# Patient Record
Sex: MALE | Race: WHITE | Employment: FULL TIME | URBAN - METROPOLITAN AREA
[De-identification: names, ages, dates, MRNs, and addresses within clinical notes are randomized per-mention and may not be internally consistent; named-entity substitution may affect disease eponyms.]

---

## 2018-01-01 ENCOUNTER — HOSPITAL ENCOUNTER (EMERGENCY)
Age: 83
Discharge: HOME | End: 2018-09-09
Attending: EMERGENCY MEDICINE
Payer: COMMERCIAL

## 2018-01-01 VITALS
DIASTOLIC BLOOD PRESSURE: 95 MMHG | HEART RATE: 66 BPM | BODY MASS INDEX: 33.43 KG/M2 | HEIGHT: 66 IN | RESPIRATION RATE: 20 BRPM | WEIGHT: 208 LBS | TEMPERATURE: 97.9 F | OXYGEN SATURATION: 77 % | SYSTOLIC BLOOD PRESSURE: 136 MMHG

## 2018-01-01 DIAGNOSIS — T50.901A ACCIDENTAL DRUG OVERDOSE, INITIAL ENCOUNTER: Primary | ICD-10-CM

## 2018-01-01 DIAGNOSIS — N17.9 AKI (ACUTE KIDNEY INJURY) (HCC): ICD-10-CM

## 2018-01-01 LAB
ANION GAP SERPL CALC-SCNC: 6 MMOL/L (ref 3–18)
ATRIAL RATE: 81 BPM
BASOPHILS # BLD: 0 K/UL (ref 0–0.1)
BASOPHILS NFR BLD: 1 % (ref 0–2)
BUN SERPL-MCNC: 30 MG/DL (ref 7–18)
BUN/CREAT SERPL: 17 (ref 12–20)
CALCIUM SERPL-MCNC: 8.1 MG/DL (ref 8.5–10.1)
CALCULATED P AXIS, ECG09: 46 DEGREES
CALCULATED R AXIS, ECG10: 62 DEGREES
CALCULATED T AXIS, ECG11: 35 DEGREES
CHLORIDE SERPL-SCNC: 106 MMOL/L (ref 100–108)
CO2 SERPL-SCNC: 28 MMOL/L (ref 21–32)
CREAT SERPL-MCNC: 1.81 MG/DL (ref 0.6–1.3)
DIAGNOSIS, 93000: NORMAL
DIFFERENTIAL METHOD BLD: ABNORMAL
EOSINOPHIL # BLD: 0.4 K/UL (ref 0–0.4)
EOSINOPHIL NFR BLD: 6 % (ref 0–5)
ERYTHROCYTE [DISTWIDTH] IN BLOOD BY AUTOMATED COUNT: 13.5 % (ref 11.6–14.5)
GLUCOSE SERPL-MCNC: 103 MG/DL (ref 74–99)
HCT VFR BLD AUTO: 36.9 % (ref 36–48)
HGB BLD-MCNC: 11.9 G/DL (ref 13–16)
LYMPHOCYTES # BLD: 1.6 K/UL (ref 0.9–3.6)
LYMPHOCYTES NFR BLD: 24 % (ref 21–52)
MAGNESIUM SERPL-MCNC: 2.1 MG/DL (ref 1.6–2.6)
MCH RBC QN AUTO: 28.5 PG (ref 24–34)
MCHC RBC AUTO-ENTMCNC: 32.2 G/DL (ref 31–37)
MCV RBC AUTO: 88.3 FL (ref 74–97)
MONOCYTES # BLD: 0.6 K/UL (ref 0.05–1.2)
MONOCYTES NFR BLD: 9 % (ref 3–10)
NEUTS SEG # BLD: 3.9 K/UL (ref 1.8–8)
NEUTS SEG NFR BLD: 60 % (ref 40–73)
P-R INTERVAL, ECG05: 226 MS
PLATELET # BLD AUTO: 208 K/UL (ref 135–420)
PMV BLD AUTO: 10.6 FL (ref 9.2–11.8)
POTASSIUM SERPL-SCNC: 4.2 MMOL/L (ref 3.5–5.5)
Q-T INTERVAL, ECG07: 438 MS
QRS DURATION, ECG06: 158 MS
QTC CALCULATION (BEZET), ECG08: 508 MS
RBC # BLD AUTO: 4.18 M/UL (ref 4.7–5.5)
SODIUM SERPL-SCNC: 140 MMOL/L (ref 136–145)
VENTRICULAR RATE, ECG03: 81 BPM
WBC # BLD AUTO: 6.5 K/UL (ref 4.6–13.2)

## 2018-01-01 PROCEDURE — 99284 EMERGENCY DEPT VISIT MOD MDM: CPT

## 2018-01-01 PROCEDURE — 93005 ELECTROCARDIOGRAM TRACING: CPT

## 2018-01-01 PROCEDURE — 85025 COMPLETE CBC W/AUTO DIFF WBC: CPT | Performed by: EMERGENCY MEDICINE

## 2018-01-01 PROCEDURE — 96360 HYDRATION IV INFUSION INIT: CPT

## 2018-01-01 PROCEDURE — 80048 BASIC METABOLIC PNL TOTAL CA: CPT | Performed by: EMERGENCY MEDICINE

## 2018-01-01 PROCEDURE — 83735 ASSAY OF MAGNESIUM: CPT | Performed by: EMERGENCY MEDICINE

## 2018-01-01 PROCEDURE — 96361 HYDRATE IV INFUSION ADD-ON: CPT

## 2018-01-01 PROCEDURE — 74011250636 HC RX REV CODE- 250/636: Performed by: EMERGENCY MEDICINE

## 2018-01-01 RX ORDER — CARVEDILOL 3.12 MG/1
TABLET ORAL 2 TIMES DAILY WITH MEALS
COMMUNITY
End: 2019-01-01

## 2018-01-01 RX ORDER — FUROSEMIDE 20 MG/1
TABLET ORAL DAILY
COMMUNITY
End: 2019-01-01

## 2018-01-01 RX ADMIN — SODIUM CHLORIDE 500 ML: 900 INJECTION, SOLUTION INTRAVENOUS at 04:19

## 2018-09-09 NOTE — DISCHARGE INSTRUCTIONS
Accidental Overdose of Medicine: Care Instructions  Your Care Instructions    Almost any medicine can cause harm if you take too much of it. You have had treatment to help your body get rid of an overdose of a medicine. This may have been an over-the-counter medicine. Or it might be one that a doctor prescribed. It may even have been a vitamin or a supplement. During treatment, the doctor may have given you fluids and medicine. You also may have had lab tests. Then the doctor made sure that you were well enough to go home. The doctor has checked you carefully, but problems can develop later. If you notice any problems or new symptoms, get medical treatment right away. Follow-up care is a key part of your treatment and safety. Be sure to make and go to all appointments, and call your doctor if you are having problems. It's also a good idea to know your test results and keep a list of the medicines you take. How can you care for yourself at home? Home care  · Drink plenty of fluids. If you have kidney, heart, or liver disease and have to limit fluids, talk with your doctor before you increase the amount of fluids you drink. · If you normally take medicines, ask your doctor when you can start taking them again. · Read the information that comes with any medicine. If you have questions, ask your doctor or pharmacist.  Prevention  · Be safe with medicines. Take your medicines exactly as prescribed or as the label directs. Call your doctor if you think you are having a problem with your medicine. · Keep your medicines in the containers they came in. Keep them with the original labels. · Find out what to do if you miss a dose of your medicine. When should you call for help? Call 911 anytime you think you may need emergency care.  For example, call if:    · You passed out (lost consciousness).     · You have trouble breathing.     · You are sleepy or hard to wake up.   [facility] your doctor now or seek immediate medical care if:    · You are vomiting.     · You have a new or worse headache.     · You are dizzy or lightheaded or feel like you may faint.    Watch closely for changes in your health, and be sure to contact your doctor if:    · You do not get better as expected. Where can you learn more? Go to http://therese-flakita.info/. Enter C165 in the search box to learn more about \"Accidental Overdose of Medicine: Care Instructions. \"  Current as of: September 10, 2017  Content Version: 11.7  © 2969-2527 [employer]. Care instructions adapted under license by [employer] (which disclaims liability or warranty for this information). If you have questions about a medical condition or this instruction, always ask your healthcare professional. [address] any warranty or liability for your use of this information.

## 2018-09-09 NOTE — ED NOTES
I have reviewed discharge instructions with the patient. The patient verbalized understanding. Reviewed discharge instructions with patient and patient verbalized understanding of all instructions. Patient signed paper discharge instructions due to computer signature pad unavailable at this time. Patient armband removed and shredded

## 2018-09-09 NOTE — ED PROVIDER NOTES
EMERGENCY DEPARTMENT HISTORY AND PHYSICAL EXAM 
 
6:27 AM 
 
 
Date: 9/9/2018 Patient Name: Hang Cuellar History of Presenting Illness Chief Complaint Patient presents with  Medication Problem Patient with history of high blood pressure, CHF pulmonary fibrosis peripheral arterial disease and diabetes presents via walk-in triage. At approximately midnight he accidentally took an overdose of his Coreg and Lasix. These pills were stored in a small cup that he normally uses to drink water he filled back up in took the pills and he realized that he had overdosed himself. He has no symptoms at this time no lightheadedness no chest pain or shortness of breath no dizziness he has urinated once. His dose of Coreg is 3.125, he took 6 tablets of this so total of approximately 18 mg of Coreg and approximately 120 mg of Lasix PCP: Kade Fisher MD 
 
Current Outpatient Prescriptions Medication Sig Dispense Refill  furosemide (LASIX) 20 mg tablet Take  by mouth daily.  carvedilol (COREG) 3.125 mg tablet Take  by mouth two (2) times daily (with meals). Past History Past Medical History: 
Past Medical History:  
Diagnosis Date  Hypertension Past Surgical History: 
Past Surgical History:  
Procedure Laterality Date  CARDIAC SURG PROCEDURE UNLIST  HX ORTHOPAEDIC Family History: 
History reviewed. No pertinent family history. Social History: 
Social History Substance Use Topics  Smoking status: Former Smoker  Smokeless tobacco: None  Alcohol use Yes Allergies: 
No Known Allergies Review of Systems Review of Systems Constitutional: Negative for fever. HENT: Negative for nosebleeds. Eyes: Negative for visual disturbance. Respiratory: Negative for shortness of breath. Cardiovascular: Negative for chest pain. Gastrointestinal: Negative for abdominal pain. Genitourinary: Negative for dysuria. Musculoskeletal: Negative for myalgias. Skin: Negative for rash. Neurological: Negative for weakness. All other systems reviewed and are negative. Visit Vitals  BP (!) 136/95  Pulse 66  Temp 97.9 °F (36.6 °C)  Resp 20  
 Ht 5' 6\" (1.676 m)  Wt 94.3 kg (208 lb)  SpO2 (!) 77%  BMI 33.57 kg/m2 Physical Exam / Medical Decision Making I am the first provider for this patient. I reviewed the vital signs, available nursing notes, past medical history, past surgical history, family history and social history. Vital Signs-Reviewed the patient's vital signs. Physical exam: 
General:  Well-developed, well-nourished, no apparent distress. Head:  Normocephalic atraumatic. Eyes:  Pupils midrange extraocular movements intact. No pallor or conjunctival injection. Nose:  No rhinorrhea, inspection grossly normal.   
Ears:  Grossly normal to inspection, no discharge. Mouth:  Mucous membranes moist, no appreciable intraoral lesion. Neck/Back:  Trachea midline, no asymmetry. Chest:  Grossly normal inspection, symmetric chest rise. Pulmonary:  Clear to auscultation bilaterally no wheezes rhonchi or rales. Cardiovascular:  S1-S2 no murmurs rubs or gallops. Abdomen: Soft, nontender, nondistended no guarding rebound or peritoneal signs. Extremities:  Grossly normal to inspection, peripheral pulses intact Neurologic:  Alert and oriented no appreciable focal neurologic deficit Skin:  Warm and dry Psychiatric:  Grossly normal mood and affect Nursing note reviewed, vital signs reviewed. ED course: 
Patient presents with accidental overdose on medications, here he is afebrile and not tachycardic saturation normal on room air primary concern for hypotension and bradycardia. He has neither of these at this time and is asymptomatic. We'll continue to monitor Monitor normal sinus rhythm with PVC 
 
 EKG done at 0141 hrs. sinus rhythm heart rate 81 significant prolongation of NC interval is 226 QRS duration 158 LEFT bundle branch block morphology and long QTc of 508.,  No prior EKGs in this system Patient had transient low blood pressure responded to IV fluids Patient was monitored for 5 hours in the emergency department, department with 7 hours postingestion, normal blood pressure, completely asymptomatic and ambulatory without difficulty Labs with SETH compared to prior, likely secondary to his Lasix he was hydrated here and will follow up with PCP for reevaluation and recheck Patient's presentation, history, physical exam and laboratory evaluations were reviewed. At this time patient was felt to be stable for outpatient management and follow with primary care/specialist.  Patient was instructed to return to the emergency department with any concerns. Disposition: 
 
Discharged home Portions of this chart were created with Dragon medical speech to text program.   Unrecognized errors may be present. Diagnostic Study Results Labs - Recent Results (from the past 12 hour(s)) CBC WITH AUTOMATED DIFF Collection Time: 09/09/18  1:40 AM  
Result Value Ref Range WBC 6.5 4.6 - 13.2 K/uL  
 RBC 4.18 (L) 4.70 - 5.50 M/uL  
 HGB 11.9 (L) 13.0 - 16.0 g/dL HCT 36.9 36.0 - 48.0 % MCV 88.3 74.0 - 97.0 FL  
 MCH 28.5 24.0 - 34.0 PG  
 MCHC 32.2 31.0 - 37.0 g/dL  
 RDW 13.5 11.6 - 14.5 % PLATELET 772 633 - 965 K/uL MPV 10.6 9.2 - 11.8 FL  
 NEUTROPHILS 60 40 - 73 % LYMPHOCYTES 24 21 - 52 % MONOCYTES 9 3 - 10 % EOSINOPHILS 6 (H) 0 - 5 % BASOPHILS 1 0 - 2 %  
 ABS. NEUTROPHILS 3.9 1.8 - 8.0 K/UL  
 ABS. LYMPHOCYTES 1.6 0.9 - 3.6 K/UL  
 ABS. MONOCYTES 0.6 0.05 - 1.2 K/UL  
 ABS. EOSINOPHILS 0.4 0.0 - 0.4 K/UL  
 ABS. BASOPHILS 0.0 0.0 - 0.1 K/UL  
 DF AUTOMATED METABOLIC PANEL, BASIC Collection Time: 09/09/18  1:40 AM  
Result Value Ref Range Sodium 140 136 - 145 mmol/L Potassium 4.2 3.5 - 5.5 mmol/L Chloride 106 100 - 108 mmol/L  
 CO2 28 21 - 32 mmol/L Anion gap 6 3.0 - 18 mmol/L Glucose 103 (H) 74 - 99 mg/dL BUN 30 (H) 7.0 - 18 MG/DL Creatinine 1.81 (H) 0.6 - 1.3 MG/DL  
 BUN/Creatinine ratio 17 12 - 20 GFR est AA 43 (L) >60 ml/min/1.73m2 GFR est non-AA 36 (L) >60 ml/min/1.73m2 Calcium 8.1 (L) 8.5 - 10.1 MG/DL MAGNESIUM Collection Time: 09/09/18  1:40 AM  
Result Value Ref Range Magnesium 2.1 1.6 - 2.6 mg/dL EKG, 12 LEAD, INITIAL Collection Time: 09/09/18  1:41 AM  
Result Value Ref Range Ventricular Rate 81 BPM  
 Atrial Rate 81 BPM  
 P-R Interval 226 ms  
 QRS Duration 158 ms Q-T Interval 438 ms QTC Calculation (Bezet) 508 ms Calculated P Axis 46 degrees Calculated R Axis 62 degrees Calculated T Axis 35 degrees Diagnosis Sinus rhythm with sinus arrhythmia with 1st degree AV block with occasional  
premature ventricular complexes Left bundle branch block Abnormal ECG When compared with ECG of 30-MAR-2009 04:27, 
premature ventricular complexes are now present Left bundle branch block is now present Criteria for Septal infarct are no longer present Radiologic Studies - No orders to display Diagnosis Clinical Impression: 1. Accidental drug overdose, initial encounter 2. SETH (acute kidney injury) ([facility] [address] Follow-up Information Follow up With Details Comments Contact Info  
 your own PCP Call in 2 days [facility] EMERGENCY DEPT  As needed, If symptoms worsen [address] 
[phone] Patient's Medications Start Taking No medications on file Continue Taking CARVEDILOL (COREG) 3.125 MG TABLET    Take  by mouth two (2) times daily (with meals). FUROSEMIDE (LASIX) 20 MG TABLET    Take  by mouth daily. These Medications have changed No medications on file Stop Taking No medications on file

## 2019-01-01 ENCOUNTER — APPOINTMENT (OUTPATIENT)
Dept: NON INVASIVE DIAGNOSTICS | Age: 84
DRG: 291 | End: 2019-01-01
Attending: FAMILY MEDICINE
Payer: COMMERCIAL

## 2019-01-01 ENCOUNTER — APPOINTMENT (OUTPATIENT)
Dept: GENERAL RADIOLOGY | Age: 84
DRG: 291 | End: 2019-01-01
Attending: EMERGENCY MEDICINE
Payer: COMMERCIAL

## 2019-01-01 ENCOUNTER — APPOINTMENT (OUTPATIENT)
Dept: CT IMAGING | Age: 84
DRG: 291 | End: 2019-01-01
Attending: INTERNAL MEDICINE
Payer: COMMERCIAL

## 2019-01-01 ENCOUNTER — APPOINTMENT (OUTPATIENT)
Dept: GENERAL RADIOLOGY | Age: 84
DRG: 291 | End: 2019-01-01
Attending: INTERNAL MEDICINE
Payer: COMMERCIAL

## 2019-01-01 ENCOUNTER — ANESTHESIA (OUTPATIENT)
Dept: ICU | Age: 84
DRG: 291 | End: 2019-01-01
Payer: COMMERCIAL

## 2019-01-01 ENCOUNTER — APPOINTMENT (OUTPATIENT)
Dept: MRI IMAGING | Age: 84
DRG: 291 | End: 2019-01-01
Attending: NURSE PRACTITIONER
Payer: COMMERCIAL

## 2019-01-01 ENCOUNTER — HOSPITAL ENCOUNTER (INPATIENT)
Age: 84
LOS: 12 days | DRG: 291 | End: 2019-01-15
Attending: EMERGENCY MEDICINE | Admitting: FAMILY MEDICINE
Payer: COMMERCIAL

## 2019-01-01 ENCOUNTER — HOME CARE VISIT (OUTPATIENT)
Dept: HOME HEALTH SERVICES | Facility: HOME HEALTH | Age: 84
End: 2019-01-01

## 2019-01-01 ENCOUNTER — HOME HEALTH ADMISSION (OUTPATIENT)
Dept: HOME HEALTH SERVICES | Facility: HOME HEALTH | Age: 84
End: 2019-01-01

## 2019-01-01 ENCOUNTER — ANESTHESIA EVENT (OUTPATIENT)
Dept: ICU | Age: 84
DRG: 291 | End: 2019-01-01
Payer: COMMERCIAL

## 2019-01-01 VITALS
DIASTOLIC BLOOD PRESSURE: 30 MMHG | SYSTOLIC BLOOD PRESSURE: 51 MMHG | TEMPERATURE: 99 F | HEART RATE: 86 BPM | RESPIRATION RATE: 11 BRPM | OXYGEN SATURATION: 84 % | HEIGHT: 66 IN | WEIGHT: 218.03 LBS | BODY MASS INDEX: 35.04 KG/M2

## 2019-01-01 DIAGNOSIS — R09.02 HYPOXIA: ICD-10-CM

## 2019-01-01 DIAGNOSIS — J18.9 HCAP (HEALTHCARE-ASSOCIATED PNEUMONIA): ICD-10-CM

## 2019-01-01 DIAGNOSIS — J06.9 ACUTE RESPIRATORY DISEASE: ICD-10-CM

## 2019-01-01 DIAGNOSIS — R52 PAIN: ICD-10-CM

## 2019-01-01 DIAGNOSIS — N18.9 CHRONIC KIDNEY DISEASE, UNSPECIFIED CKD STAGE: ICD-10-CM

## 2019-01-01 DIAGNOSIS — I50.9 ACUTE CONGESTIVE HEART FAILURE, UNSPECIFIED HEART FAILURE TYPE (HCC): Primary | ICD-10-CM

## 2019-01-01 LAB
ABO + RH BLD: NORMAL
ALBUMIN SERPL-MCNC: 2.3 G/DL (ref 3.4–5)
ALBUMIN SERPL-MCNC: 2.4 G/DL (ref 3.4–5)
ALBUMIN SERPL-MCNC: 2.4 G/DL (ref 3.4–5)
ALBUMIN SERPL-MCNC: 2.5 G/DL (ref 3.4–5)
ALBUMIN SERPL-MCNC: 2.6 G/DL (ref 3.4–5)
ALBUMIN SERPL-MCNC: 3.2 G/DL (ref 3.4–5)
ALBUMIN/GLOB SERPL: 0.9 {RATIO} (ref 0.8–1.7)
ALBUMIN/GLOB SERPL: 0.9 {RATIO} (ref 0.8–1.7)
ALBUMIN/GLOB SERPL: 1 {RATIO} (ref 0.8–1.7)
ALP SERPL-CCNC: 51 U/L (ref 45–117)
ALP SERPL-CCNC: 52 U/L (ref 45–117)
ALP SERPL-CCNC: 66 U/L (ref 45–117)
ALT SERPL-CCNC: 47 U/L (ref 16–61)
ALT SERPL-CCNC: 59 U/L (ref 16–61)
ALT SERPL-CCNC: 85 U/L (ref 16–61)
AMORPH CRY URNS QL MICRO: ABNORMAL
ANION GAP SERPL CALC-SCNC: 10 MMOL/L (ref 3–18)
ANION GAP SERPL CALC-SCNC: 6 MMOL/L (ref 3–18)
ANION GAP SERPL CALC-SCNC: 7 MMOL/L (ref 3–18)
ANION GAP SERPL CALC-SCNC: 8 MMOL/L (ref 3–18)
ANION GAP SERPL CALC-SCNC: 9 MMOL/L (ref 3–18)
APPEARANCE UR: ABNORMAL
APPEARANCE UR: CLEAR
APTT PPP: 110.5 SEC (ref 23–36.4)
APTT PPP: 24.4 SEC (ref 23–36.4)
APTT PPP: 72.6 SEC (ref 23–36.4)
APTT PPP: 88.5 SEC (ref 23–36.4)
APTT PPP: 94.2 SEC (ref 23–36.4)
APTT PPP: 96 SEC (ref 23–36.4)
APTT PPP: >180 SEC (ref 23–36.4)
ARTERIAL PATENCY WRIST A: ABNORMAL
ARTERIAL PATENCY WRIST A: YES
AST SERPL-CCNC: 20 U/L (ref 15–37)
AST SERPL-CCNC: 27 U/L (ref 15–37)
AST SERPL-CCNC: 77 U/L (ref 15–37)
ATRIAL RATE: 78 BPM
ATRIAL RATE: 82 BPM
ATRIAL RATE: 91 BPM
ATRIAL RATE: 93 BPM
BACTERIA SPEC CULT: NORMAL
BACTERIA URNS QL MICRO: ABNORMAL /HPF
BASE EXCESS BLD CALC-SCNC: 4 MMOL/L
BASE EXCESS BLD CALC-SCNC: 5 MMOL/L
BASOPHILS # BLD: 0 K/UL (ref 0–0.1)
BASOPHILS NFR BLD: 0 % (ref 0–2)
BDY SITE: ABNORMAL
BDY SITE: ABNORMAL
BILIRUB SERPL-MCNC: 0.4 MG/DL (ref 0.2–1)
BILIRUB SERPL-MCNC: 0.5 MG/DL (ref 0.2–1)
BILIRUB SERPL-MCNC: 0.7 MG/DL (ref 0.2–1)
BILIRUB UR QL: NEGATIVE
BILIRUB UR QL: NEGATIVE
BLD PROD TYP BPU: NORMAL
BLOOD GROUP ANTIBODIES SERPL: NORMAL
BNP SERPL-MCNC: ABNORMAL PG/ML (ref 0–1800)
BPU ID: NORMAL
BUN SERPL-MCNC: 43 MG/DL (ref 7–18)
BUN SERPL-MCNC: 46 MG/DL (ref 7–18)
BUN SERPL-MCNC: 48 MG/DL (ref 7–18)
BUN SERPL-MCNC: 52 MG/DL (ref 7–18)
BUN SERPL-MCNC: 55 MG/DL (ref 7–18)
BUN SERPL-MCNC: 57 MG/DL (ref 7–18)
BUN SERPL-MCNC: 59 MG/DL (ref 7–18)
BUN SERPL-MCNC: 61 MG/DL (ref 7–18)
BUN SERPL-MCNC: 66 MG/DL (ref 7–18)
BUN SERPL-MCNC: 79 MG/DL (ref 7–18)
BUN SERPL-MCNC: 82 MG/DL (ref 7–18)
BUN SERPL-MCNC: 84 MG/DL (ref 7–18)
BUN SERPL-MCNC: 86 MG/DL (ref 7–18)
BUN/CREAT SERPL: 15 (ref 12–20)
BUN/CREAT SERPL: 15 (ref 12–20)
BUN/CREAT SERPL: 16 (ref 12–20)
BUN/CREAT SERPL: 17 (ref 12–20)
BUN/CREAT SERPL: 17 (ref 12–20)
BUN/CREAT SERPL: 18 (ref 12–20)
BUN/CREAT SERPL: 18 (ref 12–20)
BUN/CREAT SERPL: 19 (ref 12–20)
BUN/CREAT SERPL: 20 (ref 12–20)
CA-I SERPL-SCNC: 1.05 MMOL/L (ref 1.12–1.32)
CALCIUM SERPL-MCNC: 7.4 MG/DL (ref 8.5–10.1)
CALCIUM SERPL-MCNC: 7.7 MG/DL (ref 8.5–10.1)
CALCIUM SERPL-MCNC: 7.7 MG/DL (ref 8.5–10.1)
CALCIUM SERPL-MCNC: 7.8 MG/DL (ref 8.5–10.1)
CALCIUM SERPL-MCNC: 7.9 MG/DL (ref 8.5–10.1)
CALCIUM SERPL-MCNC: 7.9 MG/DL (ref 8.5–10.1)
CALCIUM SERPL-MCNC: 8 MG/DL (ref 8.5–10.1)
CALCIUM SERPL-MCNC: 8.2 MG/DL (ref 8.5–10.1)
CALCULATED P AXIS, ECG09: 12 DEGREES
CALCULATED P AXIS, ECG09: 14 DEGREES
CALCULATED P AXIS, ECG09: 14 DEGREES
CALCULATED P AXIS, ECG09: 5 DEGREES
CALCULATED R AXIS, ECG10: -58 DEGREES
CALCULATED R AXIS, ECG10: 3 DEGREES
CALCULATED R AXIS, ECG10: 56 DEGREES
CALCULATED R AXIS, ECG10: 70 DEGREES
CALCULATED T AXIS, ECG11: -58 DEGREES
CALCULATED T AXIS, ECG11: 13 DEGREES
CALCULATED T AXIS, ECG11: 23 DEGREES
CALCULATED T AXIS, ECG11: 50 DEGREES
CALLED TO:,BCALL1: NORMAL
CHLORIDE SERPL-SCNC: 102 MMOL/L (ref 100–108)
CHLORIDE SERPL-SCNC: 104 MMOL/L (ref 100–108)
CHLORIDE SERPL-SCNC: 104 MMOL/L (ref 100–108)
CHLORIDE SERPL-SCNC: 106 MMOL/L (ref 100–108)
CHLORIDE SERPL-SCNC: 107 MMOL/L (ref 100–108)
CHLORIDE SERPL-SCNC: 107 MMOL/L (ref 100–108)
CHLORIDE SERPL-SCNC: 108 MMOL/L (ref 100–108)
CHLORIDE SERPL-SCNC: 109 MMOL/L (ref 100–108)
CHLORIDE SERPL-SCNC: 98 MMOL/L (ref 100–108)
CHLORIDE SERPL-SCNC: 99 MMOL/L (ref 100–108)
CHLORIDE SERPL-SCNC: 99 MMOL/L (ref 100–108)
CK MB CFR SERPL CALC: 5.2 % (ref 0–4)
CK MB CFR SERPL CALC: 5.3 % (ref 0–4)
CK MB SERPL-MCNC: 2.8 NG/ML (ref 5–25)
CK MB SERPL-MCNC: 2.9 NG/ML (ref 5–25)
CK SERPL-CCNC: 1005 U/L (ref 39–308)
CK SERPL-CCNC: 53 U/L (ref 39–308)
CK SERPL-CCNC: 56 U/L (ref 39–308)
CO2 SERPL-SCNC: 26 MMOL/L (ref 21–32)
CO2 SERPL-SCNC: 28 MMOL/L (ref 21–32)
CO2 SERPL-SCNC: 29 MMOL/L (ref 21–32)
CO2 SERPL-SCNC: 30 MMOL/L (ref 21–32)
CO2 SERPL-SCNC: 31 MMOL/L (ref 21–32)
CO2 SERPL-SCNC: 32 MMOL/L (ref 21–32)
CO2 SERPL-SCNC: 32 MMOL/L (ref 21–32)
COLOR UR: YELLOW
COLOR UR: YELLOW
CREAT SERPL-MCNC: 2.35 MG/DL (ref 0.6–1.3)
CREAT SERPL-MCNC: 2.48 MG/DL (ref 0.6–1.3)
CREAT SERPL-MCNC: 3.04 MG/DL (ref 0.6–1.3)
CREAT SERPL-MCNC: 3.13 MG/DL (ref 0.6–1.3)
CREAT SERPL-MCNC: 3.54 MG/DL (ref 0.6–1.3)
CREAT SERPL-MCNC: 3.56 MG/DL (ref 0.6–1.3)
CREAT SERPL-MCNC: 3.59 MG/DL (ref 0.6–1.3)
CREAT SERPL-MCNC: 3.98 MG/DL (ref 0.6–1.3)
CREAT SERPL-MCNC: 4.09 MG/DL (ref 0.6–1.3)
CREAT SERPL-MCNC: 4.1 MG/DL (ref 0.6–1.3)
CREAT SERPL-MCNC: 4.16 MG/DL (ref 0.6–1.3)
CREAT SERPL-MCNC: 4.2 MG/DL (ref 0.6–1.3)
CREAT SERPL-MCNC: 4.28 MG/DL (ref 0.6–1.3)
CREAT SERPL-MCNC: 4.51 MG/DL (ref 0.6–1.3)
CREAT SERPL-MCNC: 4.55 MG/DL (ref 0.6–1.3)
CROSSMATCH RESULT,%XM: NORMAL
DATE LAST DOSE: ABNORMAL
DIAGNOSIS, 93000: NORMAL
DIFFERENTIAL METHOD BLD: ABNORMAL
ECHO AV MEAN GRADIENT: 11 MMHG
ECHO AV PEAK GRADIENT: 0 MMHG
ECHO AV PEAK VELOCITY: 0 CM/S
ECHO AV VTI: 45.54 CM
ECHO EST RA PRESSURE: 10 MMHG
ECHO LA VOL 2C: 111.44 ML (ref 18–58)
ECHO LA VOL 4C: 77.04 ML (ref 18–58)
ECHO LA VOLUME INDEX A2C: 55.6 ML/M2 (ref 16–28)
ECHO LA VOLUME INDEX A4C: 38.44 ML/M2 (ref 16–28)
ECHO LV EDV A2C: 173.8 ML
ECHO LV EDV A4C: 249.6 ML
ECHO LV EDV BP: 220.4 ML (ref 67–155)
ECHO LV EDV INDEX A4C: 124.5 ML/M2
ECHO LV EDV INDEX BP: 110 ML/M2
ECHO LV EDV NDEX A2C: 86.7 ML/M2
ECHO LV EJECTION FRACTION A2C: 13 %
ECHO LV EJECTION FRACTION A4C: 0 %
ECHO LV EJECTION FRACTION BIPLANE: 6.3 % (ref 55–100)
ECHO LV ESV A2C: 151.1 ML
ECHO LV ESV A4C: 249.1 ML
ECHO LV ESV BP: 206.5 ML (ref 22–58)
ECHO LV ESV INDEX A2C: 75.4 ML/M2
ECHO LV ESV INDEX A4C: 124.3 ML/M2
ECHO LV ESV INDEX BP: 103 ML/M2
ECHO LVOT PEAK GRADIENT: 12.6 MMHG
ECHO LVOT PEAK VELOCITY: 177.22 CM/S
ECHO LVOT VTI: 38.08 CM
ECHO PULMONARY ARTERY SYSTOLIC PRESSURE (PASP): 10 MMHG
ECHO RIGHT VENTRICULAR SYSTOLIC PRESSURE (RVSP): 10 MMHG
ECHO TV REGURGITANT MAX VELOCITY: 0 CM/S
ECHO TV REGURGITANT PEAK GRADIENT: 0 MMHG
EOSINOPHIL # BLD: 0 K/UL (ref 0–0.4)
EOSINOPHIL # BLD: 0.1 K/UL (ref 0–0.4)
EOSINOPHIL NFR BLD: 0 % (ref 0–5)
EPITH CASTS URNS QL MICRO: ABNORMAL /LPF (ref 0–5)
ERYTHROCYTE [DISTWIDTH] IN BLOOD BY AUTOMATED COUNT: 13.4 % (ref 11.6–14.5)
ERYTHROCYTE [DISTWIDTH] IN BLOOD BY AUTOMATED COUNT: 13.6 % (ref 11.6–14.5)
ERYTHROCYTE [DISTWIDTH] IN BLOOD BY AUTOMATED COUNT: 13.8 % (ref 11.6–14.5)
ERYTHROCYTE [DISTWIDTH] IN BLOOD BY AUTOMATED COUNT: 14.1 % (ref 11.6–14.5)
ERYTHROCYTE [DISTWIDTH] IN BLOOD BY AUTOMATED COUNT: 14.3 % (ref 11.6–14.5)
ERYTHROCYTE [DISTWIDTH] IN BLOOD BY AUTOMATED COUNT: 14.5 % (ref 11.6–14.5)
ERYTHROCYTE [DISTWIDTH] IN BLOOD BY AUTOMATED COUNT: 14.5 % (ref 11.6–14.5)
ERYTHROCYTE [DISTWIDTH] IN BLOOD BY AUTOMATED COUNT: 14.7 % (ref 11.6–14.5)
ERYTHROCYTE [DISTWIDTH] IN BLOOD BY AUTOMATED COUNT: 14.8 % (ref 11.6–14.5)
ERYTHROCYTE [DISTWIDTH] IN BLOOD BY AUTOMATED COUNT: 14.8 % (ref 11.6–14.5)
ERYTHROCYTE [DISTWIDTH] IN BLOOD BY AUTOMATED COUNT: 14.9 % (ref 11.6–14.5)
GAS FLOW.O2 O2 DELIVERY SYS: ABNORMAL L/MIN
GAS FLOW.O2 O2 DELIVERY SYS: ABNORMAL L/MIN
GAS FLOW.O2 SETTING OXYMISER: 15 BPM
GAS FLOW.O2 SETTING OXYMISER: 16 BPM
GLOBULIN SER CALC-MCNC: 2.9 G/DL (ref 2–4)
GLOBULIN SER CALC-MCNC: 3 G/DL (ref 2–4)
GLOBULIN SER CALC-MCNC: 3.2 G/DL (ref 2–4)
GLUCOSE SERPL-MCNC: 102 MG/DL (ref 74–99)
GLUCOSE SERPL-MCNC: 113 MG/DL (ref 74–99)
GLUCOSE SERPL-MCNC: 115 MG/DL (ref 74–99)
GLUCOSE SERPL-MCNC: 124 MG/DL (ref 74–99)
GLUCOSE SERPL-MCNC: 125 MG/DL (ref 74–99)
GLUCOSE SERPL-MCNC: 132 MG/DL (ref 74–99)
GLUCOSE SERPL-MCNC: 135 MG/DL (ref 74–99)
GLUCOSE SERPL-MCNC: 148 MG/DL (ref 74–99)
GLUCOSE SERPL-MCNC: 154 MG/DL (ref 74–99)
GLUCOSE SERPL-MCNC: 240 MG/DL (ref 74–99)
GLUCOSE SERPL-MCNC: 86 MG/DL (ref 74–99)
GLUCOSE SERPL-MCNC: 91 MG/DL (ref 74–99)
GLUCOSE SERPL-MCNC: 95 MG/DL (ref 74–99)
GLUCOSE SERPL-MCNC: 98 MG/DL (ref 74–99)
GLUCOSE SERPL-MCNC: 99 MG/DL (ref 74–99)
GLUCOSE UR STRIP.AUTO-MCNC: NEGATIVE MG/DL
GLUCOSE UR STRIP.AUTO-MCNC: NEGATIVE MG/DL
GRAN CASTS URNS QL MICRO: NORMAL /LPF
HCO3 BLD-SCNC: 28.9 MMOL/L (ref 22–26)
HCO3 BLD-SCNC: 28.9 MMOL/L (ref 22–26)
HCT VFR BLD AUTO: 22.4 % (ref 36–48)
HCT VFR BLD AUTO: 23.3 % (ref 36–48)
HCT VFR BLD AUTO: 24.2 % (ref 36–48)
HCT VFR BLD AUTO: 24.4 % (ref 36–48)
HCT VFR BLD AUTO: 24.7 % (ref 36–48)
HCT VFR BLD AUTO: 25.7 % (ref 36–48)
HCT VFR BLD AUTO: 31.3 % (ref 36–48)
HCT VFR BLD AUTO: 32.5 % (ref 36–48)
HCT VFR BLD AUTO: 32.5 % (ref 36–48)
HCT VFR BLD AUTO: 32.8 % (ref 36–48)
HCT VFR BLD AUTO: 33.7 % (ref 36–48)
HCT VFR BLD AUTO: 35.7 % (ref 36–48)
HCT VFR BLD AUTO: 36.4 % (ref 36–48)
HGB BLD-MCNC: 10.2 G/DL (ref 13–16)
HGB BLD-MCNC: 10.2 G/DL (ref 13–16)
HGB BLD-MCNC: 10.4 G/DL (ref 13–16)
HGB BLD-MCNC: 10.6 G/DL (ref 13–16)
HGB BLD-MCNC: 11.4 G/DL (ref 13–16)
HGB BLD-MCNC: 11.7 G/DL (ref 13–16)
HGB BLD-MCNC: 6.9 G/DL (ref 13–16)
HGB BLD-MCNC: 7.2 G/DL (ref 13–16)
HGB BLD-MCNC: 7.3 G/DL (ref 13–16)
HGB BLD-MCNC: 7.4 G/DL (ref 13–16)
HGB BLD-MCNC: 7.5 G/DL (ref 13–16)
HGB BLD-MCNC: 7.9 G/DL (ref 13–16)
HGB BLD-MCNC: 9.6 G/DL (ref 13–16)
HGB UR QL STRIP: ABNORMAL
HGB UR QL STRIP: ABNORMAL
INR PPP: 1 (ref 0.8–1.2)
INR PPP: 1.1 (ref 0.8–1.2)
INR PPP: 1.1 (ref 0.8–1.2)
INR PPP: 1.2 (ref 0.8–1.2)
INR PPP: 1.3 (ref 0.8–1.2)
INR PPP: 1.5 (ref 0.8–1.2)
INR PPP: 2.3 (ref 0.8–1.2)
INR PPP: 2.6 (ref 0.8–1.2)
INR PPP: 3.7 (ref 0.8–1.2)
INSPIRATION.DURATION SETTING TIME VENT: 0.9 SEC
INSPIRATION.DURATION SETTING TIME VENT: 0.9 SEC
KETONES UR QL STRIP.AUTO: NEGATIVE MG/DL
KETONES UR QL STRIP.AUTO: NEGATIVE MG/DL
LACTATE BLD-SCNC: 0.68 MMOL/L (ref 0.4–2)
LACTATE SERPL-SCNC: 1.6 MMOL/L (ref 0.4–2)
LEUKOCYTE ESTERASE UR QL STRIP.AUTO: ABNORMAL
LEUKOCYTE ESTERASE UR QL STRIP.AUTO: ABNORMAL
LYMPHOCYTES # BLD: 0.4 K/UL (ref 0.9–3.6)
LYMPHOCYTES # BLD: 0.4 K/UL (ref 0.9–3.6)
LYMPHOCYTES # BLD: 0.7 K/UL (ref 0.9–3.6)
LYMPHOCYTES # BLD: 0.8 K/UL (ref 0.9–3.6)
LYMPHOCYTES # BLD: 0.9 K/UL (ref 0.9–3.6)
LYMPHOCYTES # BLD: 1.2 K/UL (ref 0.9–3.6)
LYMPHOCYTES # BLD: 1.4 K/UL (ref 0.9–3.6)
LYMPHOCYTES NFR BLD: 3 % (ref 21–52)
LYMPHOCYTES NFR BLD: 3 % (ref 21–52)
LYMPHOCYTES NFR BLD: 6 % (ref 21–52)
LYMPHOCYTES NFR BLD: 7 % (ref 21–52)
LYMPHOCYTES NFR BLD: 7 % (ref 21–52)
LYMPHOCYTES NFR BLD: 9 % (ref 21–52)
LYMPHOCYTES NFR BLD: 9 % (ref 21–52)
MAGNESIUM SERPL-MCNC: 1.9 MG/DL (ref 1.6–2.6)
MAGNESIUM SERPL-MCNC: 2.2 MG/DL (ref 1.6–2.6)
MAGNESIUM SERPL-MCNC: 2.2 MG/DL (ref 1.6–2.6)
MAGNESIUM SERPL-MCNC: 2.7 MG/DL (ref 1.6–2.6)
MAGNESIUM SERPL-MCNC: 2.7 MG/DL (ref 1.6–2.6)
MCH RBC QN AUTO: 27.3 PG (ref 24–34)
MCH RBC QN AUTO: 27.5 PG (ref 24–34)
MCH RBC QN AUTO: 27.6 PG (ref 24–34)
MCH RBC QN AUTO: 27.7 PG (ref 24–34)
MCH RBC QN AUTO: 27.8 PG (ref 24–34)
MCH RBC QN AUTO: 27.8 PG (ref 24–34)
MCH RBC QN AUTO: 27.9 PG (ref 24–34)
MCH RBC QN AUTO: 28 PG (ref 24–34)
MCH RBC QN AUTO: 28.1 PG (ref 24–34)
MCHC RBC AUTO-ENTMCNC: 29.9 G/DL (ref 31–37)
MCHC RBC AUTO-ENTMCNC: 30 G/DL (ref 31–37)
MCHC RBC AUTO-ENTMCNC: 30.7 G/DL (ref 31–37)
MCHC RBC AUTO-ENTMCNC: 30.7 G/DL (ref 31–37)
MCHC RBC AUTO-ENTMCNC: 30.8 G/DL (ref 31–37)
MCHC RBC AUTO-ENTMCNC: 30.9 G/DL (ref 31–37)
MCHC RBC AUTO-ENTMCNC: 31 G/DL (ref 31–37)
MCHC RBC AUTO-ENTMCNC: 31.1 G/DL (ref 31–37)
MCHC RBC AUTO-ENTMCNC: 31.4 G/DL (ref 31–37)
MCHC RBC AUTO-ENTMCNC: 31.5 G/DL (ref 31–37)
MCHC RBC AUTO-ENTMCNC: 31.9 G/DL (ref 31–37)
MCHC RBC AUTO-ENTMCNC: 32 G/DL (ref 31–37)
MCHC RBC AUTO-ENTMCNC: 32.1 G/DL (ref 31–37)
MCV RBC AUTO: 87.3 FL (ref 74–97)
MCV RBC AUTO: 87.4 FL (ref 74–97)
MCV RBC AUTO: 87.5 FL (ref 74–97)
MCV RBC AUTO: 88 FL (ref 74–97)
MCV RBC AUTO: 88.2 FL (ref 74–97)
MCV RBC AUTO: 88.6 FL (ref 74–97)
MCV RBC AUTO: 88.9 FL (ref 74–97)
MCV RBC AUTO: 89.2 FL (ref 74–97)
MCV RBC AUTO: 89.6 FL (ref 74–97)
MCV RBC AUTO: 89.9 FL (ref 74–97)
MCV RBC AUTO: 90.7 FL (ref 74–97)
MCV RBC AUTO: 92.2 FL (ref 74–97)
MCV RBC AUTO: 92.4 FL (ref 74–97)
MONOCYTES # BLD: 0.1 K/UL (ref 0.05–1.2)
MONOCYTES # BLD: 0.3 K/UL (ref 0.05–1.2)
MONOCYTES # BLD: 0.6 K/UL (ref 0.05–1.2)
MONOCYTES # BLD: 0.7 K/UL (ref 0.05–1.2)
MONOCYTES # BLD: 1 K/UL (ref 0.05–1.2)
MONOCYTES # BLD: 1.2 K/UL (ref 0.05–1.2)
MONOCYTES # BLD: 1.6 K/UL (ref 0.05–1.2)
MONOCYTES NFR BLD: 1 % (ref 3–10)
MONOCYTES NFR BLD: 11 % (ref 3–10)
MONOCYTES NFR BLD: 11 % (ref 3–10)
MONOCYTES NFR BLD: 2 % (ref 3–10)
MONOCYTES NFR BLD: 4 % (ref 3–10)
MONOCYTES NFR BLD: 7 % (ref 3–10)
MONOCYTES NFR BLD: 9 % (ref 3–10)
NEUTS SEG # BLD: 10.4 K/UL (ref 1.8–8)
NEUTS SEG # BLD: 12.1 K/UL (ref 1.8–8)
NEUTS SEG # BLD: 12.2 K/UL (ref 1.8–8)
NEUTS SEG # BLD: 12.5 K/UL (ref 1.8–8)
NEUTS SEG # BLD: 14.7 K/UL (ref 1.8–8)
NEUTS SEG # BLD: 9 K/UL (ref 1.8–8)
NEUTS SEG # BLD: 9.1 K/UL (ref 1.8–8)
NEUTS SEG NFR BLD: 82 % (ref 40–73)
NEUTS SEG NFR BLD: 83 % (ref 40–73)
NEUTS SEG NFR BLD: 86 % (ref 40–73)
NEUTS SEG NFR BLD: 87 % (ref 40–73)
NEUTS SEG NFR BLD: 88 % (ref 40–73)
NEUTS SEG NFR BLD: 90 % (ref 40–73)
NEUTS SEG NFR BLD: 95 % (ref 40–73)
NITRITE UR QL STRIP.AUTO: NEGATIVE
NITRITE UR QL STRIP.AUTO: NEGATIVE
O2/TOTAL GAS SETTING VFR VENT: 100 %
O2/TOTAL GAS SETTING VFR VENT: 50 %
P-R INTERVAL, ECG05: 176 MS
P-R INTERVAL, ECG05: 180 MS
P-R INTERVAL, ECG05: 184 MS
P-R INTERVAL, ECG05: 190 MS
PCO2 BLD: 40.7 MMHG (ref 35–45)
PCO2 BLD: 48 MMHG (ref 35–45)
PEEP RESPIRATORY: 5 CMH2O
PEEP RESPIRATORY: 5 CMH2O
PH BLD: 7.39 [PH] (ref 7.35–7.45)
PH BLD: 7.46 [PH] (ref 7.35–7.45)
PH UR STRIP: 5 [PH] (ref 5–8)
PH UR STRIP: 5 [PH] (ref 5–8)
PHOSPHATE SERPL-MCNC: 3 MG/DL (ref 2.5–4.9)
PHOSPHATE SERPL-MCNC: 5.3 MG/DL (ref 2.5–4.9)
PHOSPHATE SERPL-MCNC: 5.6 MG/DL (ref 2.5–4.9)
PHOSPHATE SERPL-MCNC: 5.9 MG/DL (ref 2.5–4.9)
PHOSPHATE SERPL-MCNC: 5.9 MG/DL (ref 2.5–4.9)
PHOSPHATE SERPL-MCNC: 6.1 MG/DL (ref 2.5–4.9)
PHOSPHATE SERPL-MCNC: 6.1 MG/DL (ref 2.5–4.9)
PIP ISTAT,IPIP: 26
PIP ISTAT,IPIP: 28
PLATELET # BLD AUTO: 146 K/UL (ref 135–420)
PLATELET # BLD AUTO: 150 K/UL (ref 135–420)
PLATELET # BLD AUTO: 157 K/UL (ref 135–420)
PLATELET # BLD AUTO: 160 K/UL (ref 135–420)
PLATELET # BLD AUTO: 164 K/UL (ref 135–420)
PLATELET # BLD AUTO: 167 K/UL (ref 135–420)
PLATELET # BLD AUTO: 177 K/UL (ref 135–420)
PLATELET # BLD AUTO: 186 K/UL (ref 135–420)
PLATELET # BLD AUTO: 188 K/UL (ref 135–420)
PLATELET # BLD AUTO: 195 K/UL (ref 135–420)
PLATELET # BLD AUTO: 205 K/UL (ref 135–420)
PMV BLD AUTO: 10.8 FL (ref 9.2–11.8)
PMV BLD AUTO: 11 FL (ref 9.2–11.8)
PMV BLD AUTO: 11.3 FL (ref 9.2–11.8)
PMV BLD AUTO: 11.4 FL (ref 9.2–11.8)
PMV BLD AUTO: 11.6 FL (ref 9.2–11.8)
PMV BLD AUTO: 11.6 FL (ref 9.2–11.8)
PMV BLD AUTO: 11.9 FL (ref 9.2–11.8)
PMV BLD AUTO: 12 FL (ref 9.2–11.8)
PMV BLD AUTO: 12.3 FL (ref 9.2–11.8)
PMV BLD AUTO: 12.3 FL (ref 9.2–11.8)
PMV BLD AUTO: 12.4 FL (ref 9.2–11.8)
PMV BLD AUTO: 12.5 FL (ref 9.2–11.8)
PMV BLD AUTO: 12.5 FL (ref 9.2–11.8)
PO2 BLD: 228 MMHG (ref 80–100)
PO2 BLD: 96 MMHG (ref 80–100)
POTASSIUM SERPL-SCNC: 3.9 MMOL/L (ref 3.5–5.5)
POTASSIUM SERPL-SCNC: 4 MMOL/L (ref 3.5–5.5)
POTASSIUM SERPL-SCNC: 4 MMOL/L (ref 3.5–5.5)
POTASSIUM SERPL-SCNC: 4.1 MMOL/L (ref 3.5–5.5)
POTASSIUM SERPL-SCNC: 4.2 MMOL/L (ref 3.5–5.5)
POTASSIUM SERPL-SCNC: 4.2 MMOL/L (ref 3.5–5.5)
POTASSIUM SERPL-SCNC: 4.4 MMOL/L (ref 3.5–5.5)
POTASSIUM SERPL-SCNC: 4.5 MMOL/L (ref 3.5–5.5)
POTASSIUM SERPL-SCNC: 4.5 MMOL/L (ref 3.5–5.5)
POTASSIUM SERPL-SCNC: 4.6 MMOL/L (ref 3.5–5.5)
POTASSIUM SERPL-SCNC: 4.7 MMOL/L (ref 3.5–5.5)
PROT SERPL-MCNC: 5.4 G/DL (ref 6.4–8.2)
PROT SERPL-MCNC: 5.6 G/DL (ref 6.4–8.2)
PROT SERPL-MCNC: 6.4 G/DL (ref 6.4–8.2)
PROT UR STRIP-MCNC: 100 MG/DL
PROT UR STRIP-MCNC: 100 MG/DL
PROTHROMBIN TIME: 13 SEC (ref 11.5–15.2)
PROTHROMBIN TIME: 13.5 SEC (ref 11.5–15.2)
PROTHROMBIN TIME: 14.3 SEC (ref 11.5–15.2)
PROTHROMBIN TIME: 14.6 SEC (ref 11.5–15.2)
PROTHROMBIN TIME: 15.1 SEC (ref 11.5–15.2)
PROTHROMBIN TIME: 16.3 SEC (ref 11.5–15.2)
PROTHROMBIN TIME: 18.2 SEC (ref 11.5–15.2)
PROTHROMBIN TIME: 25 SEC (ref 11.5–15.2)
PROTHROMBIN TIME: 28 SEC (ref 11.5–15.2)
PROTHROMBIN TIME: 37 SEC (ref 11.5–15.2)
Q-T INTERVAL, ECG07: 422 MS
Q-T INTERVAL, ECG07: 424 MS
Q-T INTERVAL, ECG07: 438 MS
Q-T INTERVAL, ECG07: 440 MS
QRS DURATION, ECG06: 162 MS
QRS DURATION, ECG06: 162 MS
QRS DURATION, ECG06: 164 MS
QRS DURATION, ECG06: 164 MS
QTC CALCULATION (BEZET), ECG08: 499 MS
QTC CALCULATION (BEZET), ECG08: 514 MS
QTC CALCULATION (BEZET), ECG08: 519 MS
QTC CALCULATION (BEZET), ECG08: 527 MS
RBC # BLD AUTO: 2.51 M/UL (ref 4.7–5.5)
RBC # BLD AUTO: 2.6 M/UL (ref 4.7–5.5)
RBC # BLD AUTO: 2.64 M/UL (ref 4.7–5.5)
RBC # BLD AUTO: 2.68 M/UL (ref 4.7–5.5)
RBC # BLD AUTO: 2.75 M/UL (ref 4.7–5.5)
RBC # BLD AUTO: 2.86 M/UL (ref 4.7–5.5)
RBC # BLD AUTO: 3.45 M/UL (ref 4.7–5.5)
RBC # BLD AUTO: 3.67 M/UL (ref 4.7–5.5)
RBC # BLD AUTO: 3.69 M/UL (ref 4.7–5.5)
RBC # BLD AUTO: 3.72 M/UL (ref 4.7–5.5)
RBC # BLD AUTO: 3.82 M/UL (ref 4.7–5.5)
RBC # BLD AUTO: 4.09 M/UL (ref 4.7–5.5)
RBC # BLD AUTO: 4.16 M/UL (ref 4.7–5.5)
RBC #/AREA URNS HPF: ABNORMAL /HPF (ref 0–5)
RBC #/AREA URNS HPF: NORMAL /HPF (ref 0–5)
REPORTED DOSE,DOSE: ABNORMAL UNITS
REPORTED DOSE/TIME,TMG: 500
SAO2 % BLD: 100 % (ref 92–97)
SAO2 % BLD: 98 % (ref 92–97)
SERVICE CMNT-IMP: ABNORMAL
SERVICE CMNT-IMP: ABNORMAL
SERVICE CMNT-IMP: NORMAL
SODIUM SERPL-SCNC: 133 MMOL/L (ref 136–145)
SODIUM SERPL-SCNC: 135 MMOL/L (ref 136–145)
SODIUM SERPL-SCNC: 140 MMOL/L (ref 136–145)
SODIUM SERPL-SCNC: 140 MMOL/L (ref 136–145)
SODIUM SERPL-SCNC: 141 MMOL/L (ref 136–145)
SODIUM SERPL-SCNC: 142 MMOL/L (ref 136–145)
SODIUM SERPL-SCNC: 144 MMOL/L (ref 136–145)
SODIUM SERPL-SCNC: 145 MMOL/L (ref 136–145)
SODIUM SERPL-SCNC: 145 MMOL/L (ref 136–145)
SODIUM SERPL-SCNC: 146 MMOL/L (ref 136–145)
SP GR UR REFRACTOMETRY: 1.01 (ref 1–1.03)
SP GR UR REFRACTOMETRY: 1.02 (ref 1–1.03)
SPECIMEN EXP DATE BLD: NORMAL
SPECIMEN TYPE: ABNORMAL
SPECIMEN TYPE: ABNORMAL
STATUS OF UNIT,%ST: NORMAL
TOTAL RESP. RATE, ITRR: 15
TOTAL RESP. RATE, ITRR: 15
TROPONIN I SERPL-MCNC: 0.09 NG/ML (ref 0–0.04)
TROPONIN I SERPL-MCNC: 0.11 NG/ML (ref 0–0.04)
TROPONIN I SERPL-MCNC: 0.11 NG/ML (ref 0–0.04)
UNIT DIVISION, %UDIV: 0
UROBILINOGEN UR QL STRIP.AUTO: 0.2 EU/DL (ref 0.2–1)
UROBILINOGEN UR QL STRIP.AUTO: 0.2 EU/DL (ref 0.2–1)
VANCOMYCIN SERPL-MCNC: 12 UG/ML (ref 5–40)
VANCOMYCIN SERPL-MCNC: 21.2 UG/ML (ref 5–40)
VANCOMYCIN SERPL-MCNC: 23.8 UG/ML (ref 5–40)
VANCOMYCIN SERPL-MCNC: 25.5 UG/ML (ref 5–40)
VANCOMYCIN TROUGH SERPL-MCNC: 26.7 UG/ML (ref 10–20)
VENTILATION MODE VENT: ABNORMAL
VENTILATION MODE VENT: ABNORMAL
VENTRICULAR RATE, ECG03: 78 BPM
VENTRICULAR RATE, ECG03: 82 BPM
VENTRICULAR RATE, ECG03: 91 BPM
VENTRICULAR RATE, ECG03: 93 BPM
VOLUME CONTROL PLUS IVLCP: YES
VOLUME CONTROL PLUS IVLCP: YES
VT SETTING VENT: 500 ML
VT SETTING VENT: 500 ML
WBC # BLD AUTO: 10.4 K/UL (ref 4.6–13.2)
WBC # BLD AUTO: 11.1 K/UL (ref 4.6–13.2)
WBC # BLD AUTO: 11.1 K/UL (ref 4.6–13.2)
WBC # BLD AUTO: 11.9 K/UL (ref 4.6–13.2)
WBC # BLD AUTO: 12.3 K/UL (ref 4.6–13.2)
WBC # BLD AUTO: 12.6 K/UL (ref 4.6–13.2)
WBC # BLD AUTO: 13.1 K/UL (ref 4.6–13.2)
WBC # BLD AUTO: 13.3 K/UL (ref 4.6–13.2)
WBC # BLD AUTO: 13.3 K/UL (ref 4.6–13.2)
WBC # BLD AUTO: 14.5 K/UL (ref 4.6–13.2)
WBC # BLD AUTO: 14.7 K/UL (ref 4.6–13.2)
WBC # BLD AUTO: 15.5 K/UL (ref 4.6–13.2)
WBC # BLD AUTO: 9.9 K/UL (ref 4.6–13.2)
WBC URNS QL MICRO: ABNORMAL /HPF (ref 0–4)
WBC URNS QL MICRO: NORMAL /HPF (ref 0–4)

## 2019-01-01 PROCEDURE — 74011250636 HC RX REV CODE- 250/636: Performed by: EMERGENCY MEDICINE

## 2019-01-01 PROCEDURE — 74011250636 HC RX REV CODE- 250/636: Performed by: INTERNAL MEDICINE

## 2019-01-01 PROCEDURE — 36415 COLL VENOUS BLD VENIPUNCTURE: CPT

## 2019-01-01 PROCEDURE — 77010033678 HC OXYGEN DAILY

## 2019-01-01 PROCEDURE — 74011250637 HC RX REV CODE- 250/637: Performed by: NURSE PRACTITIONER

## 2019-01-01 PROCEDURE — 85027 COMPLETE CBC AUTOMATED: CPT

## 2019-01-01 PROCEDURE — 74011250637 HC RX REV CODE- 250/637: Performed by: INTERNAL MEDICINE

## 2019-01-01 PROCEDURE — 74011250637 HC RX REV CODE- 250/637: Performed by: HOSPITALIST

## 2019-01-01 PROCEDURE — 71045 X-RAY EXAM CHEST 1 VIEW: CPT

## 2019-01-01 PROCEDURE — 74011250637 HC RX REV CODE- 250/637: Performed by: FAMILY MEDICINE

## 2019-01-01 PROCEDURE — 74011250636 HC RX REV CODE- 250/636: Performed by: HOSPITALIST

## 2019-01-01 PROCEDURE — 74011000258 HC RX REV CODE- 258: Performed by: HOSPITALIST

## 2019-01-01 PROCEDURE — 94760 N-INVAS EAR/PLS OXIMETRY 1: CPT

## 2019-01-01 PROCEDURE — 82330 ASSAY OF CALCIUM: CPT

## 2019-01-01 PROCEDURE — 77030021352 HC CBL LD SYS DISP COVD -B

## 2019-01-01 PROCEDURE — 85730 THROMBOPLASTIN TIME PARTIAL: CPT

## 2019-01-01 PROCEDURE — 97535 SELF CARE MNGMENT TRAINING: CPT

## 2019-01-01 PROCEDURE — 94762 N-INVAS EAR/PLS OXIMTRY CONT: CPT

## 2019-01-01 PROCEDURE — 93005 ELECTROCARDIOGRAM TRACING: CPT

## 2019-01-01 PROCEDURE — 84484 ASSAY OF TROPONIN QUANT: CPT

## 2019-01-01 PROCEDURE — 74011636637 HC RX REV CODE- 636/637: Performed by: FAMILY MEDICINE

## 2019-01-01 PROCEDURE — 36600 WITHDRAWAL OF ARTERIAL BLOOD: CPT

## 2019-01-01 PROCEDURE — 80069 RENAL FUNCTION PANEL: CPT

## 2019-01-01 PROCEDURE — 80053 COMPREHEN METABOLIC PANEL: CPT

## 2019-01-01 PROCEDURE — 86900 BLOOD TYPING SEROLOGIC ABO: CPT

## 2019-01-01 PROCEDURE — 77030008771 HC TU NG SALEM SUMP -A

## 2019-01-01 PROCEDURE — 74011636637 HC RX REV CODE- 636/637: Performed by: INTERNAL MEDICINE

## 2019-01-01 PROCEDURE — 82803 BLOOD GASES ANY COMBINATION: CPT

## 2019-01-01 PROCEDURE — 93306 TTE W/DOPPLER COMPLETE: CPT

## 2019-01-01 PROCEDURE — 97162 PT EVAL MOD COMPLEX 30 MIN: CPT

## 2019-01-01 PROCEDURE — 65270000029 HC RM PRIVATE

## 2019-01-01 PROCEDURE — 87086 URINE CULTURE/COLONY COUNT: CPT

## 2019-01-01 PROCEDURE — 74011636637 HC RX REV CODE- 636/637: Performed by: HOSPITALIST

## 2019-01-01 PROCEDURE — 99285 EMERGENCY DEPT VISIT HI MDM: CPT

## 2019-01-01 PROCEDURE — 83605 ASSAY OF LACTIC ACID: CPT

## 2019-01-01 PROCEDURE — 97165 OT EVAL LOW COMPLEX 30 MIN: CPT

## 2019-01-01 PROCEDURE — 36430 TRANSFUSION BLD/BLD COMPNT: CPT

## 2019-01-01 PROCEDURE — 30233N1 TRANSFUSION OF NONAUTOLOGOUS RED BLOOD CELLS INTO PERIPHERAL VEIN, PERCUTANEOUS APPROACH: ICD-10-PCS | Performed by: HOSPITALIST

## 2019-01-01 PROCEDURE — 83735 ASSAY OF MAGNESIUM: CPT

## 2019-01-01 PROCEDURE — 73718 MRI LOWER EXTREMITY W/O DYE: CPT

## 2019-01-01 PROCEDURE — 80202 ASSAY OF VANCOMYCIN: CPT

## 2019-01-01 PROCEDURE — 85025 COMPLETE CBC W/AUTO DIFF WBC: CPT

## 2019-01-01 PROCEDURE — 85610 PROTHROMBIN TIME: CPT

## 2019-01-01 PROCEDURE — 94640 AIRWAY INHALATION TREATMENT: CPT

## 2019-01-01 PROCEDURE — 74011000258 HC RX REV CODE- 258: Performed by: INTERNAL MEDICINE

## 2019-01-01 PROCEDURE — 80048 BASIC METABOLIC PNL TOTAL CA: CPT

## 2019-01-01 PROCEDURE — 97530 THERAPEUTIC ACTIVITIES: CPT

## 2019-01-01 PROCEDURE — 74011250636 HC RX REV CODE- 250/636: Performed by: NURSE PRACTITIONER

## 2019-01-01 PROCEDURE — 74011000250 HC RX REV CODE- 250: Performed by: EMERGENCY MEDICINE

## 2019-01-01 PROCEDURE — 81001 URINALYSIS AUTO W/SCOPE: CPT

## 2019-01-01 PROCEDURE — 94002 VENT MGMT INPAT INIT DAY: CPT

## 2019-01-01 PROCEDURE — 74011250637 HC RX REV CODE- 250/637: Performed by: EMERGENCY MEDICINE

## 2019-01-01 PROCEDURE — 77030029684 HC NEB SM VOL KT MONA -A

## 2019-01-01 PROCEDURE — 65610000006 HC RM INTENSIVE CARE

## 2019-01-01 PROCEDURE — 92950 HEART/LUNG RESUSCITATION CPR: CPT

## 2019-01-01 PROCEDURE — P9016 RBC LEUKOCYTES REDUCED: HCPCS

## 2019-01-01 PROCEDURE — 97116 GAIT TRAINING THERAPY: CPT

## 2019-01-01 PROCEDURE — 82550 ASSAY OF CK (CPK): CPT

## 2019-01-01 PROCEDURE — 74011250636 HC RX REV CODE- 250/636: Performed by: FAMILY MEDICINE

## 2019-01-01 PROCEDURE — 74011000250 HC RX REV CODE- 250: Performed by: NURSE PRACTITIONER

## 2019-01-01 PROCEDURE — 31500 INSERT EMERGENCY AIRWAY: CPT

## 2019-01-01 PROCEDURE — 86923 COMPATIBILITY TEST ELECTRIC: CPT

## 2019-01-01 PROCEDURE — 77030037870 HC GLD SHT PREVALON SAGE -B

## 2019-01-01 PROCEDURE — 94003 VENT MGMT INPAT SUBQ DAY: CPT

## 2019-01-01 PROCEDURE — 83880 ASSAY OF NATRIURETIC PEPTIDE: CPT

## 2019-01-01 PROCEDURE — 0BH17EZ INSERTION OF ENDOTRACHEAL AIRWAY INTO TRACHEA, VIA NATURAL OR ARTIFICIAL OPENING: ICD-10-PCS | Performed by: NURSE ANESTHETIST, CERTIFIED REGISTERED

## 2019-01-01 PROCEDURE — 74011250636 HC RX REV CODE- 250/636

## 2019-01-01 PROCEDURE — 87040 BLOOD CULTURE FOR BACTERIA: CPT

## 2019-01-01 PROCEDURE — 77030011256 HC DRSG MEPILEX <16IN NO BORD MOLN -A

## 2019-01-01 PROCEDURE — 51798 US URINE CAPACITY MEASURE: CPT

## 2019-01-01 PROCEDURE — 74011000258 HC RX REV CODE- 258: Performed by: EMERGENCY MEDICINE

## 2019-01-01 PROCEDURE — 96374 THER/PROPH/DIAG INJ IV PUSH: CPT

## 2019-01-01 PROCEDURE — 72148 MRI LUMBAR SPINE W/O DYE: CPT

## 2019-01-01 PROCEDURE — 77030037878 HC DRSG MEPILEX >48IN BORD MOLN -B

## 2019-01-01 PROCEDURE — 74011000250 HC RX REV CODE- 250: Performed by: FAMILY MEDICINE

## 2019-01-01 PROCEDURE — 94664 DEMO&/EVAL PT USE INHALER: CPT

## 2019-01-01 PROCEDURE — 74011000250 HC RX REV CODE- 250

## 2019-01-01 PROCEDURE — 5A1945Z RESPIRATORY VENTILATION, 24-96 CONSECUTIVE HOURS: ICD-10-PCS | Performed by: NURSE ANESTHETIST, CERTIFIED REGISTERED

## 2019-01-01 PROCEDURE — 77030019938 HC TBNG IV PCA ICUM -A

## 2019-01-01 PROCEDURE — 74176 CT ABD & PELVIS W/O CONTRAST: CPT

## 2019-01-01 RX ORDER — FUROSEMIDE 10 MG/ML
60 INJECTION INTRAMUSCULAR; INTRAVENOUS ONCE
Status: COMPLETED | OUTPATIENT
Start: 2019-01-01 | End: 2019-01-01

## 2019-01-01 RX ORDER — PREDNISONE 20 MG/1
40 TABLET ORAL
Status: COMPLETED | OUTPATIENT
Start: 2019-01-01 | End: 2019-01-01

## 2019-01-01 RX ORDER — LEVOFLOXACIN 5 MG/ML
500 INJECTION, SOLUTION INTRAVENOUS
Status: DISCONTINUED | OUTPATIENT
Start: 2019-01-01 | End: 2019-01-01

## 2019-01-01 RX ORDER — ASPIRIN 81 MG/1
81 TABLET ORAL DAILY
Status: DISCONTINUED | OUTPATIENT
Start: 2019-01-01 | End: 2019-01-01

## 2019-01-01 RX ORDER — TAMSULOSIN HYDROCHLORIDE 0.4 MG/1
0.4 CAPSULE ORAL DAILY
COMMUNITY

## 2019-01-01 RX ORDER — IPRATROPIUM BROMIDE AND ALBUTEROL SULFATE 2.5; .5 MG/3ML; MG/3ML
3 SOLUTION RESPIRATORY (INHALATION)
Status: DISCONTINUED | OUTPATIENT
Start: 2019-01-01 | End: 2019-01-01 | Stop reason: HOSPADM

## 2019-01-01 RX ORDER — SODIUM CHLORIDE 9 MG/ML
250 INJECTION, SOLUTION INTRAVENOUS AS NEEDED
Status: DISCONTINUED | OUTPATIENT
Start: 2019-01-01 | End: 2019-01-01 | Stop reason: HOSPADM

## 2019-01-01 RX ORDER — FUROSEMIDE 10 MG/ML
20 INJECTION INTRAMUSCULAR; INTRAVENOUS ONCE
Status: COMPLETED | OUTPATIENT
Start: 2019-01-01 | End: 2019-01-01

## 2019-01-01 RX ORDER — WARFARIN SODIUM 5 MG/1
5 TABLET ORAL EVERY EVENING
Status: DISCONTINUED | OUTPATIENT
Start: 2019-01-01 | End: 2019-01-01

## 2019-01-01 RX ORDER — PREDNISONE 20 MG/1
40 TABLET ORAL
Status: DISCONTINUED | OUTPATIENT
Start: 2019-01-01 | End: 2019-01-01

## 2019-01-01 RX ORDER — CLOPIDOGREL BISULFATE 75 MG/1
75 TABLET ORAL DAILY
Status: DISCONTINUED | OUTPATIENT
Start: 2019-01-01 | End: 2019-01-01

## 2019-01-01 RX ORDER — DOCUSATE SODIUM 100 MG/1
100 CAPSULE, LIQUID FILLED ORAL 2 TIMES DAILY
COMMUNITY

## 2019-01-01 RX ORDER — NOREPINEPHRINE BIT/0.9 % NACL 8 MG/250ML
2-16 INFUSION BOTTLE (ML) INTRAVENOUS
Status: DISCONTINUED | OUTPATIENT
Start: 2019-01-01 | End: 2019-01-01

## 2019-01-01 RX ORDER — FUROSEMIDE 40 MG/1
40 TABLET ORAL DAILY
Status: DISCONTINUED | OUTPATIENT
Start: 2019-01-01 | End: 2019-01-01

## 2019-01-01 RX ORDER — MIDAZOLAM HYDROCHLORIDE 1 MG/ML
2.5 INJECTION, SOLUTION INTRAMUSCULAR; INTRAVENOUS
Status: DISCONTINUED | OUTPATIENT
Start: 2019-01-01 | End: 2019-01-01 | Stop reason: HOSPADM

## 2019-01-01 RX ORDER — LORAZEPAM 1 MG/1
1 TABLET ORAL
Status: DISCONTINUED | OUTPATIENT
Start: 2019-01-01 | End: 2019-01-01

## 2019-01-01 RX ORDER — ENOXAPARIN SODIUM 100 MG/ML
1 INJECTION SUBCUTANEOUS EVERY 12 HOURS
Status: DISCONTINUED | OUTPATIENT
Start: 2019-01-01 | End: 2019-01-01

## 2019-01-01 RX ORDER — PREDNISONE 20 MG/1
20 TABLET ORAL
Status: ON HOLD | COMMUNITY
End: 2019-01-01 | Stop reason: SDUPTHER

## 2019-01-01 RX ORDER — PREDNISONE 20 MG/1
40 TABLET ORAL
Qty: 4 TAB | Refills: 0 | Status: SHIPPED | OUTPATIENT
Start: 2019-01-01 | End: 2019-01-01

## 2019-01-01 RX ORDER — METOPROLOL TARTRATE 25 MG/1
25 TABLET, FILM COATED ORAL EVERY 12 HOURS
Qty: 60 TAB | Refills: 0 | Status: SHIPPED | OUTPATIENT
Start: 2019-01-01

## 2019-01-01 RX ORDER — IPRATROPIUM BROMIDE AND ALBUTEROL SULFATE 2.5; .5 MG/3ML; MG/3ML
3 SOLUTION RESPIRATORY (INHALATION)
Status: DISCONTINUED | OUTPATIENT
Start: 2019-01-01 | End: 2019-01-01

## 2019-01-01 RX ORDER — ONDANSETRON 2 MG/ML
4 INJECTION INTRAMUSCULAR; INTRAVENOUS
Status: DISCONTINUED | OUTPATIENT
Start: 2019-01-01 | End: 2019-01-01 | Stop reason: HOSPADM

## 2019-01-01 RX ORDER — TRAMADOL HYDROCHLORIDE 50 MG/1
50 TABLET ORAL
Status: DISCONTINUED | OUTPATIENT
Start: 2019-01-01 | End: 2019-01-01

## 2019-01-01 RX ORDER — LEVOFLOXACIN 5 MG/ML
750 INJECTION, SOLUTION INTRAVENOUS
Status: DISCONTINUED | OUTPATIENT
Start: 2019-01-01 | End: 2019-01-01

## 2019-01-01 RX ORDER — TRAMADOL HYDROCHLORIDE 50 MG/1
50 TABLET ORAL
Qty: 8 TAB | Refills: 0 | Status: SHIPPED | OUTPATIENT
Start: 2019-01-01

## 2019-01-01 RX ORDER — PROPOFOL 10 MG/ML
INJECTION, EMULSION INTRAVENOUS
Status: DISPENSED
Start: 2019-01-01 | End: 2019-01-01

## 2019-01-01 RX ORDER — FUROSEMIDE 10 MG/ML
40 INJECTION INTRAMUSCULAR; INTRAVENOUS
Status: COMPLETED | OUTPATIENT
Start: 2019-01-01 | End: 2019-01-01

## 2019-01-01 RX ORDER — ACETAMINOPHEN 325 MG/1
650 TABLET ORAL ONCE
Status: COMPLETED | OUTPATIENT
Start: 2019-01-01 | End: 2019-01-01

## 2019-01-01 RX ORDER — NOREPINEPHRINE BIT/0.9 % NACL 8 MG/250ML
INFUSION BOTTLE (ML) INTRAVENOUS
Status: COMPLETED
Start: 2019-01-01 | End: 2019-01-01

## 2019-01-01 RX ORDER — VANCOMYCIN HYDROCHLORIDE 750 MG/15ML
INJECTION, POWDER, LYOPHILIZED, FOR SOLUTION INTRAVENOUS
Status: COMPLETED
Start: 2019-01-01 | End: 2019-01-01

## 2019-01-01 RX ORDER — PREDNISONE 20 MG/1
20 TABLET ORAL
Qty: 1 TAB | Refills: 0 | Status: SHIPPED
Start: 2019-01-01

## 2019-01-01 RX ORDER — FENTANYL CITRATE 50 UG/ML
25 INJECTION, SOLUTION INTRAMUSCULAR; INTRAVENOUS
Status: DISCONTINUED | OUTPATIENT
Start: 2019-01-01 | End: 2019-01-01

## 2019-01-01 RX ORDER — ACETAMINOPHEN 325 MG/1
650 TABLET ORAL ONCE
Status: DISCONTINUED | OUTPATIENT
Start: 2019-01-01 | End: 2019-01-01

## 2019-01-01 RX ORDER — ATORVASTATIN CALCIUM 40 MG/1
40 TABLET, FILM COATED ORAL DAILY
Status: DISCONTINUED | OUTPATIENT
Start: 2019-01-01 | End: 2019-01-01

## 2019-01-01 RX ORDER — PREDNISONE 20 MG/1
60 TABLET ORAL
Status: DISCONTINUED | OUTPATIENT
Start: 2019-01-01 | End: 2019-01-01

## 2019-01-01 RX ORDER — METOPROLOL TARTRATE 25 MG/1
25 TABLET, FILM COATED ORAL EVERY 12 HOURS
Status: DISCONTINUED | OUTPATIENT
Start: 2019-01-01 | End: 2019-01-01

## 2019-01-01 RX ORDER — WARFARIN SODIUM 5 MG/1
5 TABLET ORAL EVERY EVENING
Qty: 30 TAB | Refills: 0 | Status: SHIPPED | OUTPATIENT
Start: 2019-01-01 | End: 2019-01-01 | Stop reason: SINTOL

## 2019-01-01 RX ORDER — DIPHENHYDRAMINE HYDROCHLORIDE 50 MG/ML
50 INJECTION, SOLUTION INTRAMUSCULAR; INTRAVENOUS ONCE
Status: DISCONTINUED | OUTPATIENT
Start: 2019-01-01 | End: 2019-01-01

## 2019-01-01 RX ORDER — LANOLIN ALCOHOL/MO/W.PET/CERES
325 CREAM (GRAM) TOPICAL
COMMUNITY

## 2019-01-01 RX ORDER — SODIUM CHLORIDE 0.9 % (FLUSH) 0.9 %
5-10 SYRINGE (ML) INJECTION AS NEEDED
Status: DISCONTINUED | OUTPATIENT
Start: 2019-01-01 | End: 2019-01-01 | Stop reason: HOSPADM

## 2019-01-01 RX ORDER — LANOLIN ALCOHOL/MO/W.PET/CERES
325 CREAM (GRAM) TOPICAL
Status: DISCONTINUED | OUTPATIENT
Start: 2019-01-01 | End: 2019-01-01

## 2019-01-01 RX ORDER — SULFAMETHOXAZOLE AND TRIMETHOPRIM 400; 80 MG/1; MG/1
1 TABLET ORAL
Status: DISCONTINUED | OUTPATIENT
Start: 2019-01-01 | End: 2019-01-01

## 2019-01-01 RX ORDER — ISOSORBIDE DINITRATE 10 MG/1
5 TABLET ORAL 2 TIMES DAILY
Status: DISCONTINUED | OUTPATIENT
Start: 2019-01-01 | End: 2019-01-01

## 2019-01-01 RX ORDER — FUROSEMIDE 40 MG/1
TABLET ORAL
Qty: 30 TAB | Refills: 0 | Status: SHIPPED | OUTPATIENT
Start: 2019-01-01

## 2019-01-01 RX ORDER — ACETAMINOPHEN 650 MG/1
650 SUPPOSITORY RECTAL
Status: DISCONTINUED | OUTPATIENT
Start: 2019-01-01 | End: 2019-01-01 | Stop reason: HOSPADM

## 2019-01-01 RX ORDER — PHENOL/SODIUM PHENOLATE
20 AEROSOL, SPRAY (ML) MUCOUS MEMBRANE DAILY
COMMUNITY

## 2019-01-01 RX ORDER — HEPARIN SODIUM 5000 [USP'U]/ML
1 INJECTION, SOLUTION INTRAVENOUS; SUBCUTANEOUS 2 TIMES DAILY
Qty: 10 SYRINGE | Refills: 0 | Status: SHIPPED | OUTPATIENT
Start: 2019-01-01 | End: 2019-01-01

## 2019-01-01 RX ORDER — FENTANYL CITRATE-0.9 % NACL/PF 900MCG/30
PATIENT CONTROLLED ANALGESIA VIAL INJECTION
Status: DISCONTINUED | OUTPATIENT
Start: 2019-01-01 | End: 2019-01-01 | Stop reason: HOSPADM

## 2019-01-01 RX ORDER — IPRATROPIUM BROMIDE AND ALBUTEROL SULFATE 2.5; .5 MG/3ML; MG/3ML
3 SOLUTION RESPIRATORY (INHALATION)
Status: COMPLETED | OUTPATIENT
Start: 2019-01-01 | End: 2019-01-01

## 2019-01-01 RX ORDER — PREDNISONE 20 MG/1
20 TABLET ORAL
Status: DISCONTINUED | OUTPATIENT
Start: 2019-01-01 | End: 2019-01-01

## 2019-01-01 RX ORDER — TAMSULOSIN HYDROCHLORIDE 0.4 MG/1
0.4 CAPSULE ORAL DAILY
Status: DISCONTINUED | OUTPATIENT
Start: 2019-01-01 | End: 2019-01-01

## 2019-01-01 RX ORDER — FUROSEMIDE 10 MG/ML
40 INJECTION INTRAMUSCULAR; INTRAVENOUS 2 TIMES DAILY
Status: DISCONTINUED | OUTPATIENT
Start: 2019-01-01 | End: 2019-01-01

## 2019-01-01 RX ORDER — EPINEPHRINE 0.1 MG/ML
INJECTION INTRACARDIAC; INTRAVENOUS
Status: COMPLETED | OUTPATIENT
Start: 2019-01-01 | End: 2019-01-01

## 2019-01-01 RX ORDER — SODIUM CHLORIDE 9 MG/ML
100 INJECTION, SOLUTION INTRAVENOUS CONTINUOUS
Status: DISPENSED | OUTPATIENT
Start: 2019-01-01 | End: 2019-01-01

## 2019-01-01 RX ORDER — ACETAMINOPHEN 325 MG/1
650 TABLET ORAL
Status: DISCONTINUED | OUTPATIENT
Start: 2019-01-01 | End: 2019-01-01 | Stop reason: HOSPADM

## 2019-01-01 RX ORDER — AMIODARONE HYDROCHLORIDE 150 MG/3ML
300 INJECTION, SOLUTION INTRAVENOUS DAILY
Status: DISCONTINUED | OUTPATIENT
Start: 2019-01-01 | End: 2019-01-01

## 2019-01-01 RX ORDER — DIPHENHYDRAMINE HYDROCHLORIDE 50 MG/ML
50 INJECTION, SOLUTION INTRAMUSCULAR; INTRAVENOUS ONCE
Status: COMPLETED | OUTPATIENT
Start: 2019-01-01 | End: 2019-01-01

## 2019-01-01 RX ORDER — ATORVASTATIN CALCIUM 40 MG/1
40 TABLET, FILM COATED ORAL DAILY
COMMUNITY

## 2019-01-01 RX ORDER — LORAZEPAM 2 MG/ML
1 INJECTION INTRAMUSCULAR
Status: DISCONTINUED | OUTPATIENT
Start: 2019-01-01 | End: 2019-01-01 | Stop reason: HOSPADM

## 2019-01-01 RX ORDER — GUAIFENESIN 100 MG/5ML
81 LIQUID (ML) ORAL DAILY
Status: DISCONTINUED | OUTPATIENT
Start: 2019-01-01 | End: 2019-01-01

## 2019-01-01 RX ORDER — FENTANYL CITRATE 50 UG/ML
25 INJECTION, SOLUTION INTRAMUSCULAR; INTRAVENOUS
Status: DISCONTINUED | OUTPATIENT
Start: 2019-01-01 | End: 2019-01-01 | Stop reason: HOSPADM

## 2019-01-01 RX ORDER — ACETAMINOPHEN 325 MG/1
650 TABLET ORAL
Status: DISCONTINUED | OUTPATIENT
Start: 2019-01-01 | End: 2019-01-01

## 2019-01-01 RX ORDER — AMIODARONE HYDROCHLORIDE 200 MG/1
400 TABLET ORAL DAILY
Status: DISCONTINUED | OUTPATIENT
Start: 2019-01-01 | End: 2019-01-01

## 2019-01-01 RX ORDER — AMIODARONE HYDROCHLORIDE 400 MG/1
400 TABLET ORAL DAILY
Qty: 30 TAB | Refills: 0 | Status: SHIPPED | OUTPATIENT
Start: 2019-01-01

## 2019-01-01 RX ORDER — PANTOPRAZOLE SODIUM 20 MG/1
20 TABLET, DELAYED RELEASE ORAL DAILY
Status: DISCONTINUED | OUTPATIENT
Start: 2019-01-01 | End: 2019-01-01

## 2019-01-01 RX ORDER — DOCUSATE SODIUM 100 MG/1
100 CAPSULE, LIQUID FILLED ORAL 2 TIMES DAILY
Status: DISCONTINUED | OUTPATIENT
Start: 2019-01-01 | End: 2019-01-01

## 2019-01-01 RX ORDER — SODIUM CHLORIDE 900 MG/100ML
INJECTION INTRAVENOUS
Status: COMPLETED
Start: 2019-01-01 | End: 2019-01-01

## 2019-01-01 RX ORDER — PROPOFOL 10 MG/ML
5-50 VIAL (ML) INTRAVENOUS
Status: DISCONTINUED | OUTPATIENT
Start: 2019-01-01 | End: 2019-01-01

## 2019-01-01 RX ORDER — CARVEDILOL 3.12 MG/1
3.12 TABLET ORAL 2 TIMES DAILY WITH MEALS
Status: DISCONTINUED | OUTPATIENT
Start: 2019-01-01 | End: 2019-01-01

## 2019-01-01 RX ORDER — FUROSEMIDE 40 MG/1
40 TABLET ORAL 2 TIMES DAILY
Status: DISCONTINUED | OUTPATIENT
Start: 2019-01-01 | End: 2019-01-01

## 2019-01-01 RX ORDER — FENTANYL CITRATE 50 UG/ML
INJECTION, SOLUTION INTRAMUSCULAR; INTRAVENOUS
Status: COMPLETED
Start: 2019-01-01 | End: 2019-01-01

## 2019-01-01 RX ORDER — ASPIRIN 81 MG/1
81 TABLET ORAL DAILY
COMMUNITY

## 2019-01-01 RX ORDER — HEPARIN SODIUM 1000 [USP'U]/ML
80 INJECTION, SOLUTION INTRAVENOUS; SUBCUTANEOUS ONCE
Status: COMPLETED | OUTPATIENT
Start: 2019-01-01 | End: 2019-01-01

## 2019-01-01 RX ORDER — SULFAMETHOXAZOLE AND TRIMETHOPRIM 400; 80 MG/1; MG/1
1 TABLET ORAL
Qty: 12 TAB | Refills: 0 | Status: SHIPPED | OUTPATIENT
Start: 2019-01-01

## 2019-01-01 RX ORDER — WARFARIN 2.5 MG/1
2.5 TABLET ORAL EVERY EVENING
Status: DISCONTINUED | OUTPATIENT
Start: 2019-01-01 | End: 2019-01-01

## 2019-01-01 RX ORDER — FUROSEMIDE 10 MG/ML
40 INJECTION INTRAMUSCULAR; INTRAVENOUS ONCE
Status: COMPLETED | OUTPATIENT
Start: 2019-01-01 | End: 2019-01-01

## 2019-01-01 RX ORDER — ISOSORBIDE DINITRATE 5 MG/1
5 TABLET ORAL 2 TIMES DAILY
Qty: 60 TAB | Refills: 0 | Status: SHIPPED | OUTPATIENT
Start: 2019-01-01

## 2019-01-01 RX ORDER — HEPARIN SODIUM 10000 [USP'U]/100ML
18-36 INJECTION, SOLUTION INTRAVENOUS
Status: DISCONTINUED | OUTPATIENT
Start: 2019-01-01 | End: 2019-01-01

## 2019-01-01 RX ADMIN — ACETAMINOPHEN 650 MG: 325 TABLET, FILM COATED ORAL at 13:02

## 2019-01-01 RX ADMIN — TAMSULOSIN HYDROCHLORIDE 0.4 MG: 0.4 CAPSULE ORAL at 08:50

## 2019-01-01 RX ADMIN — TAMSULOSIN HYDROCHLORIDE 0.4 MG: 0.4 CAPSULE ORAL at 08:01

## 2019-01-01 RX ADMIN — FERROUS SULFATE TAB 325 MG (65 MG ELEMENTAL FE) 325 MG: 325 (65 FE) TAB at 09:11

## 2019-01-01 RX ADMIN — HEPARIN SODIUM AND DEXTROSE 10 UNITS/KG/HR: 10000; 5 INJECTION INTRAVENOUS at 19:22

## 2019-01-01 RX ADMIN — ISOSORBIDE DINITRATE 5 MG: 10 TABLET ORAL at 17:18

## 2019-01-01 RX ADMIN — FUROSEMIDE 40 MG: 40 TABLET ORAL at 17:21

## 2019-01-01 RX ADMIN — TRAMADOL HYDROCHLORIDE 50 MG: 50 TABLET, FILM COATED ORAL at 08:47

## 2019-01-01 RX ADMIN — PIPERACILLIN SODIUM,TAZOBACTAM SODIUM 3.38 G: 3; .375 INJECTION, POWDER, FOR SOLUTION INTRAVENOUS at 03:07

## 2019-01-01 RX ADMIN — ISOSORBIDE DINITRATE 5 MG: 10 TABLET ORAL at 17:27

## 2019-01-01 RX ADMIN — TAMSULOSIN HYDROCHLORIDE 0.4 MG: 0.4 CAPSULE ORAL at 09:11

## 2019-01-01 RX ADMIN — ACETAMINOPHEN 650 MG: 325 TABLET, FILM COATED ORAL at 21:22

## 2019-01-01 RX ADMIN — ATORVASTATIN CALCIUM 40 MG: 40 TABLET, FILM COATED ORAL at 09:11

## 2019-01-01 RX ADMIN — SODIUM CHLORIDE 750 MG: 900 INJECTION, SOLUTION INTRAVENOUS at 21:24

## 2019-01-01 RX ADMIN — PANTOPRAZOLE SODIUM 20 MG: 20 TABLET, DELAYED RELEASE ORAL at 09:11

## 2019-01-01 RX ADMIN — ISOSORBIDE DINITRATE 5 MG: 10 TABLET ORAL at 08:40

## 2019-01-01 RX ADMIN — PREDNISONE 60 MG: 20 TABLET ORAL at 08:41

## 2019-01-01 RX ADMIN — FUROSEMIDE 40 MG: 40 TABLET ORAL at 08:41

## 2019-01-01 RX ADMIN — METOPROLOL TARTRATE 25 MG: 25 TABLET ORAL at 21:59

## 2019-01-01 RX ADMIN — DOCUSATE SODIUM 100 MG: 100 CAPSULE, LIQUID FILLED ORAL at 09:42

## 2019-01-01 RX ADMIN — AMIODARONE HYDROCHLORIDE 400 MG: 200 TABLET ORAL at 08:59

## 2019-01-01 RX ADMIN — METOPROLOL TARTRATE 25 MG: 25 TABLET ORAL at 21:24

## 2019-01-01 RX ADMIN — FUROSEMIDE 40 MG: 10 INJECTION, SOLUTION INTRAMUSCULAR; INTRAVENOUS at 09:32

## 2019-01-01 RX ADMIN — SODIUM CHLORIDE 1000 MG: 900 INJECTION, SOLUTION INTRAVENOUS at 22:28

## 2019-01-01 RX ADMIN — PIPERACILLIN SODIUM,TAZOBACTAM SODIUM 3.38 G: 3; .375 INJECTION, POWDER, FOR SOLUTION INTRAVENOUS at 16:39

## 2019-01-01 RX ADMIN — WARFARIN SODIUM 5 MG: 5 TABLET ORAL at 17:27

## 2019-01-01 RX ADMIN — PIPERACILLIN SODIUM,TAZOBACTAM SODIUM 3.38 G: 3; .375 INJECTION, POWDER, FOR SOLUTION INTRAVENOUS at 13:33

## 2019-01-01 RX ADMIN — LORAZEPAM 1 MG: 2 INJECTION, SOLUTION INTRAMUSCULAR; INTRAVENOUS at 20:35

## 2019-01-01 RX ADMIN — DOCUSATE SODIUM 100 MG: 100 CAPSULE, LIQUID FILLED ORAL at 09:23

## 2019-01-01 RX ADMIN — LEVOFLOXACIN 500 MG: 5 INJECTION, SOLUTION INTRAVENOUS at 00:23

## 2019-01-01 RX ADMIN — FENTANYL CITRATE 25 MCG: 50 INJECTION, SOLUTION INTRAMUSCULAR; INTRAVENOUS at 16:55

## 2019-01-01 RX ADMIN — FERROUS SULFATE TAB 325 MG (65 MG ELEMENTAL FE) 325 MG: 325 (65 FE) TAB at 08:41

## 2019-01-01 RX ADMIN — ACETAMINOPHEN 650 MG: 325 TABLET, FILM COATED ORAL at 20:13

## 2019-01-01 RX ADMIN — LORAZEPAM 1 MG: 1 TABLET ORAL at 11:22

## 2019-01-01 RX ADMIN — PROPOFOL 15 MCG/KG/MIN: 10 INJECTION, EMULSION INTRAVENOUS at 15:14

## 2019-01-01 RX ADMIN — PIPERACILLIN SODIUM,TAZOBACTAM SODIUM 3.38 G: 3; .375 INJECTION, POWDER, FOR SOLUTION INTRAVENOUS at 19:05

## 2019-01-01 RX ADMIN — TAMSULOSIN HYDROCHLORIDE 0.4 MG: 0.4 CAPSULE ORAL at 08:13

## 2019-01-01 RX ADMIN — MULTIPLE VITAMINS W/ MINERALS TAB 1 TABLET: TAB at 09:23

## 2019-01-01 RX ADMIN — CEFTRIAXONE 2 G: 2 INJECTION, POWDER, FOR SOLUTION INTRAMUSCULAR; INTRAVENOUS at 20:50

## 2019-01-01 RX ADMIN — AMIODARONE HYDROCHLORIDE 400 MG: 200 TABLET ORAL at 08:01

## 2019-01-01 RX ADMIN — SODIUM CHLORIDE 750 MG: 900 INJECTION, SOLUTION INTRAVENOUS at 05:34

## 2019-01-01 RX ADMIN — WARFARIN SODIUM 5 MG: 5 TABLET ORAL at 17:53

## 2019-01-01 RX ADMIN — PREDNISONE 60 MG: 20 TABLET ORAL at 09:22

## 2019-01-01 RX ADMIN — ACETAMINOPHEN 650 MG: 325 TABLET, FILM COATED ORAL at 09:46

## 2019-01-01 RX ADMIN — ATORVASTATIN CALCIUM 40 MG: 40 TABLET, FILM COATED ORAL at 08:40

## 2019-01-01 RX ADMIN — IPRATROPIUM BROMIDE AND ALBUTEROL SULFATE 3 ML: .5; 3 SOLUTION RESPIRATORY (INHALATION) at 23:32

## 2019-01-01 RX ADMIN — FERROUS SULFATE TAB 325 MG (65 MG ELEMENTAL FE) 325 MG: 325 (65 FE) TAB at 08:50

## 2019-01-01 RX ADMIN — TRAMADOL HYDROCHLORIDE 50 MG: 50 TABLET, FILM COATED ORAL at 05:17

## 2019-01-01 RX ADMIN — ASPIRIN 81 MG: 81 TABLET, COATED ORAL at 08:50

## 2019-01-01 RX ADMIN — TAMSULOSIN HYDROCHLORIDE 0.4 MG: 0.4 CAPSULE ORAL at 08:59

## 2019-01-01 RX ADMIN — DOCUSATE SODIUM 100 MG: 100 CAPSULE, LIQUID FILLED ORAL at 17:21

## 2019-01-01 RX ADMIN — PIPERACILLIN SODIUM,TAZOBACTAM SODIUM 3.38 G: 3; .375 INJECTION, POWDER, FOR SOLUTION INTRAVENOUS at 20:37

## 2019-01-01 RX ADMIN — ERYTHROPOIETIN 10000 UNITS: 10000 INJECTION, SOLUTION INTRAVENOUS; SUBCUTANEOUS at 20:39

## 2019-01-01 RX ADMIN — PREDNISONE 60 MG: 20 TABLET ORAL at 09:10

## 2019-01-01 RX ADMIN — ACETAMINOPHEN 650 MG: 325 TABLET, FILM COATED ORAL at 23:24

## 2019-01-01 RX ADMIN — EPINEPHRINE 1 MG: 0.1 INJECTION, SOLUTION ENDOTRACHEAL; INTRACARDIAC; INTRAVENOUS at 13:15

## 2019-01-01 RX ADMIN — FERROUS SULFATE TAB 325 MG (65 MG ELEMENTAL FE) 325 MG: 325 (65 FE) TAB at 09:31

## 2019-01-01 RX ADMIN — ISOSORBIDE DINITRATE 5 MG: 10 TABLET ORAL at 17:15

## 2019-01-01 RX ADMIN — MULTIPLE VITAMINS W/ MINERALS TAB 1 TABLET: TAB at 08:50

## 2019-01-01 RX ADMIN — PREDNISONE 60 MG: 20 TABLET ORAL at 08:59

## 2019-01-01 RX ADMIN — DOCUSATE SODIUM 100 MG: 100 CAPSULE, LIQUID FILLED ORAL at 09:11

## 2019-01-01 RX ADMIN — ISOSORBIDE DINITRATE 5 MG: 10 TABLET ORAL at 08:50

## 2019-01-01 RX ADMIN — SULFAMETHOXAZOLE AND TRIMETHOPRIM 1 TABLET: 400; 80 TABLET ORAL at 18:16

## 2019-01-01 RX ADMIN — PIPERACILLIN SODIUM,TAZOBACTAM SODIUM 3.38 G: 3; .375 INJECTION, POWDER, FOR SOLUTION INTRAVENOUS at 05:11

## 2019-01-01 RX ADMIN — FUROSEMIDE 40 MG: 10 INJECTION, SOLUTION INTRAMUSCULAR; INTRAVENOUS at 23:33

## 2019-01-01 RX ADMIN — SODIUM CHLORIDE 750 MG: 900 INJECTION, SOLUTION INTRAVENOUS at 05:09

## 2019-01-01 RX ADMIN — PANTOPRAZOLE SODIUM 20 MG: 20 TABLET, DELAYED RELEASE ORAL at 09:09

## 2019-01-01 RX ADMIN — PANTOPRAZOLE SODIUM 20 MG: 20 TABLET, DELAYED RELEASE ORAL at 08:48

## 2019-01-01 RX ADMIN — DOCUSATE SODIUM 100 MG: 100 CAPSULE, LIQUID FILLED ORAL at 09:31

## 2019-01-01 RX ADMIN — ISOSORBIDE DINITRATE 5 MG: 10 TABLET ORAL at 09:09

## 2019-01-01 RX ADMIN — PROPOFOL 30 MCG/KG/MIN: 10 INJECTION, EMULSION INTRAVENOUS at 00:22

## 2019-01-01 RX ADMIN — PANTOPRAZOLE SODIUM 20 MG: 20 TABLET, DELAYED RELEASE ORAL at 08:51

## 2019-01-01 RX ADMIN — WARFARIN SODIUM 5 MG: 5 TABLET ORAL at 19:04

## 2019-01-01 RX ADMIN — ATORVASTATIN CALCIUM 40 MG: 40 TABLET, FILM COATED ORAL at 08:01

## 2019-01-01 RX ADMIN — PIPERACILLIN SODIUM,TAZOBACTAM SODIUM 3.38 G: 3; .375 INJECTION, POWDER, FOR SOLUTION INTRAVENOUS at 17:53

## 2019-01-01 RX ADMIN — TRAMADOL HYDROCHLORIDE 50 MG: 50 TABLET, FILM COATED ORAL at 23:41

## 2019-01-01 RX ADMIN — PIPERACILLIN SODIUM,TAZOBACTAM SODIUM 3.38 G: 3; .375 INJECTION, POWDER, FOR SOLUTION INTRAVENOUS at 05:17

## 2019-01-01 RX ADMIN — PANTOPRAZOLE SODIUM 20 MG: 20 TABLET, DELAYED RELEASE ORAL at 09:32

## 2019-01-01 RX ADMIN — METOPROLOL TARTRATE 25 MG: 25 TABLET ORAL at 08:50

## 2019-01-01 RX ADMIN — DOCUSATE SODIUM 100 MG: 100 CAPSULE, LIQUID FILLED ORAL at 17:53

## 2019-01-01 RX ADMIN — AMIODARONE HYDROCHLORIDE 400 MG: 200 TABLET ORAL at 08:50

## 2019-01-01 RX ADMIN — AMIODARONE HYDROCHLORIDE 0.5 MG/MIN: 1.8 INJECTION, SOLUTION INTRAVENOUS at 23:07

## 2019-01-01 RX ADMIN — METOPROLOL TARTRATE 25 MG: 25 TABLET ORAL at 21:06

## 2019-01-01 RX ADMIN — METOPROLOL TARTRATE 25 MG: 25 TABLET ORAL at 22:28

## 2019-01-01 RX ADMIN — WARFARIN SODIUM 5 MG: 5 TABLET ORAL at 17:21

## 2019-01-01 RX ADMIN — FUROSEMIDE 40 MG: 40 TABLET ORAL at 08:59

## 2019-01-01 RX ADMIN — ISOSORBIDE DINITRATE 5 MG: 10 TABLET ORAL at 19:04

## 2019-01-01 RX ADMIN — PIPERACILLIN SODIUM,TAZOBACTAM SODIUM 3.38 G: 3; .375 INJECTION, POWDER, FOR SOLUTION INTRAVENOUS at 04:50

## 2019-01-01 RX ADMIN — PIPERACILLIN SODIUM,TAZOBACTAM SODIUM 3.38 G: 3; .375 INJECTION, POWDER, FOR SOLUTION INTRAVENOUS at 17:18

## 2019-01-01 RX ADMIN — PANTOPRAZOLE SODIUM 20 MG: 20 TABLET, DELAYED RELEASE ORAL at 08:41

## 2019-01-01 RX ADMIN — ACETAMINOPHEN 650 MG: 325 TABLET, FILM COATED ORAL at 04:50

## 2019-01-01 RX ADMIN — TRAMADOL HYDROCHLORIDE 50 MG: 50 TABLET, FILM COATED ORAL at 08:40

## 2019-01-01 RX ADMIN — TRAMADOL HYDROCHLORIDE 50 MG: 50 TABLET, FILM COATED ORAL at 15:30

## 2019-01-01 RX ADMIN — FERROUS SULFATE TAB 325 MG (65 MG ELEMENTAL FE) 325 MG: 325 (65 FE) TAB at 09:43

## 2019-01-01 RX ADMIN — ATORVASTATIN CALCIUM 40 MG: 40 TABLET, FILM COATED ORAL at 08:16

## 2019-01-01 RX ADMIN — METOPROLOL TARTRATE 25 MG: 25 TABLET ORAL at 09:11

## 2019-01-01 RX ADMIN — WARFARIN SODIUM 5 MG: 5 TABLET ORAL at 17:15

## 2019-01-01 RX ADMIN — PIPERACILLIN SODIUM,TAZOBACTAM SODIUM 3.38 G: 3; .375 INJECTION, POWDER, FOR SOLUTION INTRAVENOUS at 04:11

## 2019-01-01 RX ADMIN — LORAZEPAM 1 MG: 1 TABLET ORAL at 05:21

## 2019-01-01 RX ADMIN — HEPARIN SODIUM AND DEXTROSE 18 UNITS/KG/HR: 10000; 5 INJECTION INTRAVENOUS at 17:32

## 2019-01-01 RX ADMIN — FERROUS SULFATE TAB 325 MG (65 MG ELEMENTAL FE) 325 MG: 325 (65 FE) TAB at 09:00

## 2019-01-01 RX ADMIN — FERROUS SULFATE TAB 325 MG (65 MG ELEMENTAL FE) 325 MG: 325 (65 FE) TAB at 08:51

## 2019-01-01 RX ADMIN — LEVOFLOXACIN 500 MG: 5 INJECTION, SOLUTION INTRAVENOUS at 02:15

## 2019-01-01 RX ADMIN — FERROUS SULFATE TAB 325 MG (65 MG ELEMENTAL FE) 325 MG: 325 (65 FE) TAB at 08:01

## 2019-01-01 RX ADMIN — TRAMADOL HYDROCHLORIDE 50 MG: 50 TABLET, FILM COATED ORAL at 08:13

## 2019-01-01 RX ADMIN — TAMSULOSIN HYDROCHLORIDE 0.4 MG: 0.4 CAPSULE ORAL at 09:31

## 2019-01-01 RX ADMIN — FERROUS SULFATE TAB 325 MG (65 MG ELEMENTAL FE) 325 MG: 325 (65 FE) TAB at 09:23

## 2019-01-01 RX ADMIN — AMIODARONE HYDROCHLORIDE 400 MG: 200 TABLET ORAL at 09:42

## 2019-01-01 RX ADMIN — MULTIPLE VITAMINS W/ MINERALS TAB 1 TABLET: TAB at 08:40

## 2019-01-01 RX ADMIN — AMIODARONE HYDROCHLORIDE 400 MG: 200 TABLET ORAL at 08:41

## 2019-01-01 RX ADMIN — TRAMADOL HYDROCHLORIDE 50 MG: 50 TABLET, FILM COATED ORAL at 14:05

## 2019-01-01 RX ADMIN — DOCUSATE SODIUM 100 MG: 100 CAPSULE, LIQUID FILLED ORAL at 17:18

## 2019-01-01 RX ADMIN — DOCUSATE SODIUM 100 MG: 100 CAPSULE, LIQUID FILLED ORAL at 19:04

## 2019-01-01 RX ADMIN — CARVEDILOL 3.12 MG: 3.12 TABLET, FILM COATED ORAL at 08:50

## 2019-01-01 RX ADMIN — PREDNISONE 40 MG: 20 TABLET ORAL at 08:50

## 2019-01-01 RX ADMIN — TRAMADOL HYDROCHLORIDE 50 MG: 50 TABLET, FILM COATED ORAL at 02:00

## 2019-01-01 RX ADMIN — Medication: at 21:09

## 2019-01-01 RX ADMIN — DOCUSATE SODIUM 100 MG: 100 CAPSULE, LIQUID FILLED ORAL at 08:50

## 2019-01-01 RX ADMIN — ACETAMINOPHEN 650 MG: 325 TABLET, FILM COATED ORAL at 14:24

## 2019-01-01 RX ADMIN — FERROUS SULFATE TAB 325 MG (65 MG ELEMENTAL FE) 325 MG: 325 (65 FE) TAB at 08:13

## 2019-01-01 RX ADMIN — AMIODARONE HYDROCHLORIDE 0.5 MG/MIN: 1.8 INJECTION, SOLUTION INTRAVENOUS at 11:18

## 2019-01-01 RX ADMIN — FUROSEMIDE 40 MG: 10 INJECTION, SOLUTION INTRAMUSCULAR; INTRAVENOUS at 11:22

## 2019-01-01 RX ADMIN — ISOSORBIDE DINITRATE 5 MG: 10 TABLET ORAL at 11:40

## 2019-01-01 RX ADMIN — TRAMADOL HYDROCHLORIDE 50 MG: 50 TABLET, FILM COATED ORAL at 01:46

## 2019-01-01 RX ADMIN — DIPHENHYDRAMINE HYDROCHLORIDE 50 MG: 50 INJECTION, SOLUTION INTRAMUSCULAR; INTRAVENOUS at 14:24

## 2019-01-01 RX ADMIN — AMIODARONE HYDROCHLORIDE 400 MG: 200 TABLET ORAL at 09:09

## 2019-01-01 RX ADMIN — PIPERACILLIN SODIUM,TAZOBACTAM SODIUM 3.38 G: 3; .375 INJECTION, POWDER, FOR SOLUTION INTRAVENOUS at 04:25

## 2019-01-01 RX ADMIN — HYDROMORPHONE HYDROCHLORIDE: 10 INJECTION, SOLUTION INTRAMUSCULAR; INTRAVENOUS; SUBCUTANEOUS at 14:16

## 2019-01-01 RX ADMIN — MULTIPLE VITAMINS W/ MINERALS TAB 1 TABLET: TAB at 09:09

## 2019-01-01 RX ADMIN — PANTOPRAZOLE SODIUM 20 MG: 20 TABLET, DELAYED RELEASE ORAL at 08:01

## 2019-01-01 RX ADMIN — TRAMADOL HYDROCHLORIDE 50 MG: 50 TABLET, FILM COATED ORAL at 15:06

## 2019-01-01 RX ADMIN — TRAMADOL HYDROCHLORIDE 50 MG: 50 TABLET, FILM COATED ORAL at 00:57

## 2019-01-01 RX ADMIN — MULTIPLE VITAMINS W/ MINERALS TAB 1 TABLET: TAB at 09:00

## 2019-01-01 RX ADMIN — VANCOMYCIN HYDROCHLORIDE: 750 INJECTION, POWDER, LYOPHILIZED, FOR SOLUTION INTRAVENOUS at 06:00

## 2019-01-01 RX ADMIN — ATORVASTATIN CALCIUM 40 MG: 40 TABLET, FILM COATED ORAL at 09:44

## 2019-01-01 RX ADMIN — SODIUM CHLORIDE 1000 MG: 900 INJECTION, SOLUTION INTRAVENOUS at 03:14

## 2019-01-01 RX ADMIN — DOCUSATE SODIUM 100 MG: 100 CAPSULE, LIQUID FILLED ORAL at 09:00

## 2019-01-01 RX ADMIN — SODIUM CHLORIDE: 900 INJECTION, SOLUTION INTRAVENOUS at 06:00

## 2019-01-01 RX ADMIN — IPRATROPIUM BROMIDE AND ALBUTEROL SULFATE 3 ML: .5; 3 SOLUTION RESPIRATORY (INHALATION) at 11:46

## 2019-01-01 RX ADMIN — PHYTONADIONE 10 MG: 10 INJECTION, EMULSION INTRAMUSCULAR; INTRAVENOUS; SUBCUTANEOUS at 11:49

## 2019-01-01 RX ADMIN — ACETAMINOPHEN 650 MG: 325 TABLET, FILM COATED ORAL at 09:17

## 2019-01-01 RX ADMIN — ACETAMINOPHEN 650 MG: 325 TABLET, FILM COATED ORAL at 17:15

## 2019-01-01 RX ADMIN — EPINEPHRINE 1 MG: 0.1 INJECTION, SOLUTION ENDOTRACHEAL; INTRACARDIAC; INTRAVENOUS at 13:10

## 2019-01-01 RX ADMIN — HEPARIN SODIUM 7300 UNITS: 1000 INJECTION INTRAVENOUS; SUBCUTANEOUS at 17:29

## 2019-01-01 RX ADMIN — PREDNISONE 20 MG: 20 TABLET ORAL at 09:31

## 2019-01-01 RX ADMIN — CARVEDILOL 3.12 MG: 3.12 TABLET, FILM COATED ORAL at 09:31

## 2019-01-01 RX ADMIN — ASPIRIN 81 MG 81 MG: 81 TABLET ORAL at 09:23

## 2019-01-01 RX ADMIN — ASPIRIN 81 MG 81 MG: 81 TABLET ORAL at 08:54

## 2019-01-01 RX ADMIN — METOPROLOL TARTRATE 25 MG: 25 TABLET ORAL at 08:59

## 2019-01-01 RX ADMIN — SODIUM CHLORIDE 1750 MG: 900 INJECTION, SOLUTION INTRAVENOUS at 09:12

## 2019-01-01 RX ADMIN — LEVOFLOXACIN 750 MG: 5 INJECTION, SOLUTION INTRAVENOUS at 00:19

## 2019-01-01 RX ADMIN — DOCUSATE SODIUM 100 MG: 100 CAPSULE, LIQUID FILLED ORAL at 17:15

## 2019-01-01 RX ADMIN — SULFAMETHOXAZOLE AND TRIMETHOPRIM 1 TABLET: 400; 80 TABLET ORAL at 16:39

## 2019-01-01 RX ADMIN — PROPOFOL 10 MCG/KG/MIN: 10 INJECTION, EMULSION INTRAVENOUS at 02:03

## 2019-01-01 RX ADMIN — TAMSULOSIN HYDROCHLORIDE 0.4 MG: 0.4 CAPSULE ORAL at 09:23

## 2019-01-01 RX ADMIN — AMIODARONE HYDROCHLORIDE 400 MG: 200 TABLET ORAL at 09:22

## 2019-01-01 RX ADMIN — ERYTHROPOIETIN 10000 UNITS: 10000 INJECTION, SOLUTION INTRAVENOUS; SUBCUTANEOUS at 17:17

## 2019-01-01 RX ADMIN — PREDNISONE 60 MG: 20 TABLET ORAL at 08:01

## 2019-01-01 RX ADMIN — ATORVASTATIN CALCIUM 40 MG: 40 TABLET, FILM COATED ORAL at 08:50

## 2019-01-01 RX ADMIN — DOCUSATE SODIUM 100 MG: 100 CAPSULE, LIQUID FILLED ORAL at 17:27

## 2019-01-01 RX ADMIN — SODIUM CHLORIDE 750 MG: 900 INJECTION, SOLUTION INTRAVENOUS at 21:56

## 2019-01-01 RX ADMIN — METOPROLOL TARTRATE 25 MG: 25 TABLET ORAL at 20:42

## 2019-01-01 RX ADMIN — CARVEDILOL 3.12 MG: 3.12 TABLET, FILM COATED ORAL at 17:21

## 2019-01-01 RX ADMIN — PIPERACILLIN SODIUM,TAZOBACTAM SODIUM 3.38 G: 3; .375 INJECTION, POWDER, FOR SOLUTION INTRAVENOUS at 17:14

## 2019-01-01 RX ADMIN — AMIODARONE HYDROCHLORIDE 400 MG: 200 TABLET ORAL at 08:16

## 2019-01-01 RX ADMIN — METOPROLOL TARTRATE 25 MG: 25 TABLET ORAL at 21:55

## 2019-01-01 RX ADMIN — IPRATROPIUM BROMIDE AND ALBUTEROL SULFATE 3 ML: .5; 3 SOLUTION RESPIRATORY (INHALATION) at 04:30

## 2019-01-01 RX ADMIN — ATORVASTATIN CALCIUM 40 MG: 40 TABLET, FILM COATED ORAL at 08:59

## 2019-01-01 RX ADMIN — ATORVASTATIN CALCIUM 40 MG: 40 TABLET, FILM COATED ORAL at 09:22

## 2019-01-01 RX ADMIN — FUROSEMIDE 20 MG: 10 INJECTION, SOLUTION INTRAMUSCULAR; INTRAVENOUS at 14:30

## 2019-01-01 RX ADMIN — PREDNISONE 60 MG: 20 TABLET ORAL at 09:42

## 2019-01-01 RX ADMIN — TAMSULOSIN HYDROCHLORIDE 0.4 MG: 0.4 CAPSULE ORAL at 09:42

## 2019-01-01 RX ADMIN — MULTIPLE VITAMINS W/ MINERALS TAB 1 TABLET: TAB at 08:13

## 2019-01-01 RX ADMIN — FENTANYL CITRATE 25 MCG: 50 INJECTION, SOLUTION INTRAMUSCULAR; INTRAVENOUS at 18:04

## 2019-01-01 RX ADMIN — FUROSEMIDE 40 MG: 40 TABLET ORAL at 08:50

## 2019-01-01 RX ADMIN — PANTOPRAZOLE SODIUM 20 MG: 20 TABLET, DELAYED RELEASE ORAL at 08:59

## 2019-01-01 RX ADMIN — PIPERACILLIN SODIUM,TAZOBACTAM SODIUM 3.38 G: 3; .375 INJECTION, POWDER, FOR SOLUTION INTRAVENOUS at 09:30

## 2019-01-01 RX ADMIN — PANTOPRAZOLE SODIUM 20 MG: 20 TABLET, DELAYED RELEASE ORAL at 09:42

## 2019-01-01 RX ADMIN — PREDNISONE 20 MG: 20 TABLET ORAL at 08:50

## 2019-01-01 RX ADMIN — SULFAMETHOXAZOLE AND TRIMETHOPRIM 1 TABLET: 400; 80 TABLET ORAL at 19:04

## 2019-01-01 RX ADMIN — PANTOPRAZOLE SODIUM 20 MG: 20 TABLET, DELAYED RELEASE ORAL at 08:16

## 2019-01-01 RX ADMIN — FUROSEMIDE 40 MG: 10 INJECTION, SOLUTION INTRAMUSCULAR; INTRAVENOUS at 17:21

## 2019-01-01 RX ADMIN — TRAMADOL HYDROCHLORIDE 50 MG: 50 TABLET, FILM COATED ORAL at 20:06

## 2019-01-01 RX ADMIN — ISOSORBIDE DINITRATE 5 MG: 10 TABLET ORAL at 09:00

## 2019-01-01 RX ADMIN — TRAMADOL HYDROCHLORIDE 50 MG: 50 TABLET, FILM COATED ORAL at 02:09

## 2019-01-01 RX ADMIN — LORAZEPAM 1 MG: 1 TABLET ORAL at 10:54

## 2019-01-01 RX ADMIN — TAMSULOSIN HYDROCHLORIDE 0.4 MG: 0.4 CAPSULE ORAL at 08:40

## 2019-01-01 RX ADMIN — METOPROLOL TARTRATE 25 MG: 25 TABLET ORAL at 08:41

## 2019-01-01 RX ADMIN — ATORVASTATIN CALCIUM 40 MG: 40 TABLET, FILM COATED ORAL at 09:32

## 2019-01-01 RX ADMIN — TRAMADOL HYDROCHLORIDE 50 MG: 50 TABLET, FILM COATED ORAL at 09:23

## 2019-01-01 RX ADMIN — PIPERACILLIN SODIUM,TAZOBACTAM SODIUM 3.38 G: 3; .375 INJECTION, POWDER, FOR SOLUTION INTRAVENOUS at 05:25

## 2019-01-01 RX ADMIN — TRAMADOL HYDROCHLORIDE 50 MG: 50 TABLET, FILM COATED ORAL at 20:38

## 2019-01-01 RX ADMIN — SODIUM CHLORIDE 100 ML/HR: 900 INJECTION, SOLUTION INTRAVENOUS at 14:31

## 2019-01-01 RX ADMIN — ACETAMINOPHEN 650 MG: 325 TABLET, FILM COATED ORAL at 02:45

## 2019-01-01 RX ADMIN — HEPARIN SODIUM AND DEXTROSE 10 UNITS/KG/HR: 10000; 5 INJECTION INTRAVENOUS at 15:21

## 2019-01-01 RX ADMIN — ASPIRIN 81 MG: 81 TABLET, COATED ORAL at 09:32

## 2019-01-01 RX ADMIN — PREDNISONE 60 MG: 20 TABLET ORAL at 09:11

## 2019-01-01 RX ADMIN — TRAMADOL HYDROCHLORIDE 50 MG: 50 TABLET, FILM COATED ORAL at 08:53

## 2019-01-01 RX ADMIN — NITROGLYCERIN 1 INCH: 20 OINTMENT TOPICAL at 23:29

## 2019-01-01 RX ADMIN — METOPROLOL TARTRATE 25 MG: 25 TABLET ORAL at 09:43

## 2019-01-01 RX ADMIN — Medication 10 MCG/MIN: at 14:00

## 2019-01-01 RX ADMIN — FUROSEMIDE 40 MG: 40 TABLET ORAL at 09:42

## 2019-01-01 RX ADMIN — SULFAMETHOXAZOLE AND TRIMETHOPRIM 1 TABLET: 400; 80 TABLET ORAL at 17:18

## 2019-01-01 RX ADMIN — METHYLPREDNISOLONE SODIUM SUCCINATE 50 MG: 40 INJECTION, POWDER, FOR SOLUTION INTRAMUSCULAR; INTRAVENOUS at 15:26

## 2019-01-01 RX ADMIN — ISOSORBIDE DINITRATE 5 MG: 10 TABLET ORAL at 17:53

## 2019-01-01 RX ADMIN — LORAZEPAM 1 MG: 2 INJECTION, SOLUTION INTRAMUSCULAR; INTRAVENOUS at 15:43

## 2019-01-01 RX ADMIN — ACETAMINOPHEN 650 MG: 325 TABLET, FILM COATED ORAL at 23:32

## 2019-01-01 RX ADMIN — ACETAMINOPHEN 650 MG: 325 TABLET, FILM COATED ORAL at 04:39

## 2019-01-01 RX ADMIN — HEPARIN SODIUM AND DEXTROSE 12 UNITS/KG/HR: 10000; 5 INJECTION INTRAVENOUS at 13:57

## 2019-01-01 RX ADMIN — ISOSORBIDE DINITRATE 5 MG: 10 TABLET ORAL at 09:44

## 2019-01-01 RX ADMIN — HYDROMORPHONE HYDROCHLORIDE: 2 INJECTION INTRAMUSCULAR; INTRAVENOUS; SUBCUTANEOUS at 23:01

## 2019-01-01 RX ADMIN — MULTIPLE VITAMINS W/ MINERALS TAB 1 TABLET: TAB at 09:31

## 2019-01-01 RX ADMIN — CLOPIDOGREL BISULFATE 75 MG: 75 TABLET ORAL at 08:54

## 2019-01-01 RX ADMIN — DOCUSATE SODIUM 100 MG: 100 CAPSULE, LIQUID FILLED ORAL at 08:40

## 2019-01-01 RX ADMIN — RITUXIMAB 500 MG: 10 INJECTION, SOLUTION INTRAVENOUS at 14:55

## 2019-01-01 RX ADMIN — LEVOFLOXACIN 500 MG: 5 INJECTION, SOLUTION INTRAVENOUS at 00:11

## 2019-01-01 RX ADMIN — MULTIPLE VITAMINS W/ MINERALS TAB 1 TABLET: TAB at 09:42

## 2019-01-01 RX ADMIN — PANTOPRAZOLE SODIUM 20 MG: 20 TABLET, DELAYED RELEASE ORAL at 09:22

## 2019-01-01 RX ADMIN — PREDNISONE 60 MG: 20 TABLET ORAL at 08:13

## 2019-01-01 RX ADMIN — FUROSEMIDE 60 MG: 10 INJECTION, SOLUTION INTRAMUSCULAR; INTRAVENOUS at 13:15

## 2019-01-01 RX ADMIN — CLOPIDOGREL BISULFATE 75 MG: 75 TABLET ORAL at 09:22

## 2019-01-01 RX ADMIN — NOREPINEPHRINE BITARTRATE 10 MCG/MIN: 1 INJECTION INTRAVENOUS at 14:00

## 2019-01-01 RX ADMIN — LORAZEPAM 1 MG: 1 TABLET ORAL at 17:17

## 2019-01-01 RX ADMIN — METOPROLOL TARTRATE 25 MG: 25 TABLET ORAL at 16:39

## 2019-01-01 RX ADMIN — PIPERACILLIN SODIUM,TAZOBACTAM SODIUM 3.38 G: 3; .375 INJECTION, POWDER, FOR SOLUTION INTRAVENOUS at 17:27

## 2019-01-01 RX ADMIN — PREDNISONE 40 MG: 20 TABLET ORAL at 13:05

## 2019-01-01 RX ADMIN — TRAMADOL HYDROCHLORIDE 50 MG: 50 TABLET, FILM COATED ORAL at 08:59

## 2019-01-01 RX ADMIN — FUROSEMIDE 40 MG: 10 INJECTION, SOLUTION INTRAMUSCULAR; INTRAVENOUS at 08:51

## 2019-01-01 RX ADMIN — MULTIPLE VITAMINS W/ MINERALS TAB 1 TABLET: TAB at 09:11

## 2019-01-01 RX ADMIN — PIPERACILLIN SODIUM,TAZOBACTAM SODIUM 3.38 G: 3; .375 INJECTION, POWDER, FOR SOLUTION INTRAVENOUS at 06:18

## 2019-01-03 PROBLEM — J18.9 HCAP (HEALTHCARE-ASSOCIATED PNEUMONIA): Status: ACTIVE | Noted: 2019-01-01

## 2019-01-03 PROBLEM — I50.9 ACUTE EXACERBATION OF CHF (CONGESTIVE HEART FAILURE) (HCC): Status: ACTIVE | Noted: 2019-01-01

## 2019-01-03 NOTE — CONSULTS
[facility] - Consult Note Date of  Admission: 1/2/2019 11:12 PM  
Primary Care Physician:  Saintclair Pon, MD 
Patient seen and examined independently. Patient developed increasing SOB shortly after discharge. Responding well to diuresis. Echo shows marked decrease in LV function with EF of 15% and a small calcified thrombus in the apex. Prosthetic valve function difficult to assess but  transvalvular velocity similar to that of recent [facility] study of 12/22/18. Would continue telemetry monitoring as he is having periods of marked rate irregularity on the monitor this afternoon. Agree with assessment and plan as below. Seth Conteh MD 
 Assessment:  
 
- Cardiology consult due to acute on chronic CHF 
- Atrial fibrillation with RVR, CHADS2-Vasc score 8 (CHF, HTN, Age, DM, Possible recent TIA, Vascular disease) - Echo 01/03/19 with EF <15%, LV global hypokinesis, small calcified thrombus in LV (fixed and located in apex). Porcine bioprosthetic aortic valve. - Echo 12/22/18 with EF 15% 
- CKD III 
- Recent hospital admission d/c 01/01/19 with poss TIA with transient vision loss, seen by neuro and vascular surgery, deemed likely ischemic 
- CT chest done 12/24/18 with multiple pulmonary and pleural based nodules suspicious for neoplasm. It was recommended to pursue PET scan at d/c 
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 
- Hx of Severe aortic stenosis s/p aortic valve replacement with a 23 mm Reggie-Diaz porcine valve and closure of a patent foramen ovale 4/17/09.  
- Hx of cardiomyopathy with echocardiogram 2/13/09 demonstrating an EF of 35% with  
improvement of ejection fraction to 45% by echocardiography post aortic valve  
replacement about March 2010.  
- Cardiac catheterization 4/2/09 demonstrating severe aortic stenosis with mild coronary disease, 50% stenosis of the posterolateral OM  
- Hypertension  
- Diabetes Mellitus - Remote hx tobacco use Plan: - Diuresis ordered with IV Lasix, strict I&O's, follow renal indices. Recommend nephrology consult to aid in volume management with CKD III and recent renal biopsy - Possible Afib with very high CHADS2-Vasc score, LV thrombus, EKG to confirm afib as it appears to be irregular on telemetry. Would need Coumadin with history of aortic valve replacement, will ask primary team to start dosing.  
- Low dose Coreg is ordered, no ace/ARB or Entresto with renal function History of Present Illness: This is a 80 y.o. male admitted for HCAP (healthcare-associated pneumonia) Acute exacerbation of CHF (congestive heart failure) ([facility] Patient complains of:  Shortness of breath This is an 80year old male with a history as outlined above that cardiology is seeing in consult due to CHF. He had a recent admission at [facility] with pertinent details outlined in assessment. Had a renal biopsy done with showing vasculitis. Over the last several days, he felt like he could not breathe and had some LE edema. His BNP was elevated at 22K and CXR with pulmonary edema and superimposed chronic interstitial lung disease. Denies chest pain. No syncope, orthopnea, or PND. Denies known history of afib. Cardiac risk factors: diabetes mellitus, male gender, hypertension Review of Symptoms:  Except as stated above include: 
Constitutional:  negative Respiratory:  +dyspnea Cardiovascular: per HPI Gastrointestinal: negative Genitourinary:  negative Musculoskeletal:  Negative Neurological:  Negative Dermatological:  Negative Endocrinological: Negative Psychological:  Negative A comprehensive review of systems was negative except for that written in the HPI. Past Medical History:  
 
Past Medical History:  
Diagnosis Date  BPH (benign prostatic hyperplasia)  Congestive heart failure (CHF) ([facility]  DM (diabetes mellitus) ([facility]  Hypercholesteremia  Hypertension  PAD (peripheral artery disease) ([facility] [address]  Pulmonary fibrosis ([facility] Social History:  
 
Social History Socioeconomic History  Marital status:  Spouse name: Not on file  Number of children: Not on file  Years of education: Not on file  Highest education level: Not on file Tobacco Use  Smoking status: Former Smoker  Smokeless tobacco: Never Used Substance and Sexual Activity  Alcohol use: Yes  Drug use: No  
 
 
 Family History:  
History reviewed. No pertinent family history. Medications: Allergies Allergen Reactions  Protamine Anaphylaxis Current Facility-Administered Medications Medication Dose Route Frequency  sodium chloride (NS) flush 5-10 mL  5-10 mL IntraVENous PRN  
 levoFLOXacin (LEVAQUIN) 750 mg in D5W IVPB  750 mg IntraVENous Q48H  VANCOMYCIN INFORMATION NOTE   Other Rx Dosing/Monitoring  aspirin delayed-release tablet 81 mg  81 mg Oral DAILY  atorvastatin (LIPITOR) tablet 40 mg  40 mg Oral DAILY  carvedilol (COREG) tablet 3.125 mg  3.125 mg Oral BID WITH MEALS  docusate sodium (COLACE) capsule 100 mg  100 mg Oral BID  ferrous sulfate tablet 325 mg  325 mg Oral ACB  multivitamin, tx-iron-ca-min (THERA-M w/ IRON) tablet 1 Tab  1 Tab Oral DAILY  pantoprazole (PROTONIX) tablet 20 mg  20 mg Oral DAILY  predniSONE (DELTASONE) tablet 20 mg  20 mg Oral DAILY WITH BREAKFAST  tamsulosin (FLOMAX) capsule 0.4 mg  0.4 mg Oral DAILY  acetaminophen (TYLENOL) tablet 650 mg  650 mg Oral Q4H PRN  
 ondansetron (ZOFRAN) injection 4 mg  4 mg IntraVENous Q4H PRN  
 furosemide (LASIX) injection 40 mg  40 mg IntraVENous BID  [START ON 1/4/2019] VANCOMYCIN INFORMATION NOTE   Other ONCE  piperacillin-tazobactam (ZOSYN) 3.375 g in 0.9% sodium chloride (MBP/ADV) 100 mL MBP  #EXTENDED 4-HOUR INFUSION##  3.375 g IntraVENous Q8H  
  albuterol-ipratropium (DUO-NEB) 2.5 MG-0.5 MG/3 ML  3 mL Nebulization Q4H PRN Physical Exam:  
 
Visit Vitals /59 (BP 1 Location: Right arm, BP Patient Position: Sitting) Pulse 74 Temp 97.8 °F (36.6 °C) Resp 20 Ht 5' 6\" (1.676 m) Wt 201 lb (91.2 kg) SpO2 97% BMI 32.44 kg/m² BP Readings from Last 3 Encounters:  
01/03/19 102/59  
09/09/18 (!) 136/95 Pulse Readings from Last 3 Encounters:  
01/03/19 74  
09/09/18 66 Wt Readings from Last 3 Encounters:  
01/03/19 201 lb (91.2 kg) 09/09/18 208 lb (94.3 kg) General:  alert, cooperative, no distress Neck:  nontender, no JVD Lungs:  Crackles at bases Heart:  irregularly irregular rhythm Abdomen:  abdomen is soft without significant tenderness, masses, organomegaly or guarding Extremities:  edema 2+ bilateral LE's 
Skin: Warm and dry. no hyperpigmentation, vitiligo, or suspicious lesions Neuro: alert, oriented x3, affect appropriate Psych: non focal 
 
 Data Review:  
 
Recent Labs 01/03/19 
0805 01/02/19 
2330 WBC 11.1 11.9 HGB 10.4* 11.4* HCT 32.5* 35.7*  
 188 Recent Labs 01/03/19 
0805 01/02/19 
2330 * 133* K 3.9 4.5  
CL 99* 98* CO2 28 26 GLU 86 135* BUN 43* 48* CREA 2.35* 2.48* CA 7.4* 7.8*  
MG 1.9 2.2 ALB 2.6* 3.2* SGOT 20 27 ALT 47 59 INR  --  1.0 Results for orders placed or performed during the hospital encounter of 01/02/19 EKG, 12 LEAD, INITIAL Result Value Ref Range Ventricular Rate 91 BPM  
 Atrial Rate 91 BPM  
 P-R Interval 176 ms QRS Duration 164 ms Q-T Interval 422 ms QTC Calculation (Bezet) 519 ms Calculated P Axis 14 degrees Calculated R Axis -58 degrees Calculated T Axis 50 degrees Diagnosis Sinus rhythm with PACs Left axis deviation Left bundle branch block Abnormal ECG When compared with ECG of 09-SEP-2018 01:41, 
QRS axis shifted left Confirmed by Margo Martin ([id number]) on 1/3/2019 9:22:35 AM 
 All Cardiac Markers in the last 24 hours:   
Lab Results Component Value Date/Time CPK 56 01/03/2019 08:05 AM  
 CKMB 2.9 01/03/2019 08:05 AM  
 CKND1 5.2 (H) 01/03/2019 08:05 AM  
 TROIQ 0.11 (H) 01/03/2019 08:05 AM  
 TROIQ 0.09 (H) 01/02/2019 11:30 PM  
 
 
Last Lipid:  No results found for: CHOL, CHOLX, CHLST, CHOLV, HDL, LDL, LDLC, DLDLP, TGLX, TRIGL, TRIGP, CHHD, CHHDX Signed By: Hercules Olszewski. Yessica Daniel PA-C January 3, 2019

## 2019-01-03 NOTE — ED NOTES
TRANSFER - ED to INPATIENT REPORT: 
 
SBAR report made available to receiving floor on this patient being transferred to [address] [place] [address] ([facility])  for routine progression of care Admitting diagnosis HCAP (healthcare-associated pneumonia) Acute exacerbation of CHF (congestive heart failure) ([facility] [address] Information from the following report(s) SBAR was made available to receiving floor. Lines:  
Peripheral IV 01/02/19 Right Antecubital (Active) Site Assessment Clean, dry, & intact 1/2/2019 11:30 PM  
Phlebitis Assessment 0 1/2/2019 11:30 PM  
Infiltration Assessment 0 1/2/2019 11:30 PM  
Dressing Status Clean, dry, & intact 1/2/2019 11:30 PM  
Dressing Type Tape;Transparent 1/2/2019 11:30 PM  
Hub Color/Line Status Pink;Flushed;Patent 1/2/2019 11:30 PM  
Action Taken Blood drawn 1/2/2019 11:30 PM  
  
 
Medication list confirmed with patient Opportunity for questions and clarification was provided. Patient is oriented to time, place, person and situation Patient is  continent and ambulatory with assist 
  
Valuables transported with patient Patient transported with: 
 Oxygen 4lpm NC Vitals w/ MEWS Score (last day) Date/Time MEWS Score Pulse Resp Temp BP Level of Consciousness SpO2  
 01/02/19 2326    85  25    138/105  (Abnormal)     95 % 01/02/19 2318  2  99  27  98.1 °F (36.7 °C)  155/93  (Abnormal)   Alert  95 %

## 2019-01-03 NOTE — PROGRESS NOTES
Reason for Admission:   HCAP/CHF exacerbation RRAT Score:  13 Do you (patient/family) have any concerns for transition/discharge? Not @ this time. Plan for utilizing home health:   Qualifies for [facility], agreeable. Likelihood of readmission? Mod/yellow Transition of Care Plan:   Home with family support, home health for [facility], & out-pt follow up when medically stable. Chart reviewed. Met with pt/dtr, verified all demographics. States has [employer] ins. States Dr. Maicol Caba is his PCP, unsure when last seen, has appointment this morning, but now in hospital.  NOK: Valentín Gan, spouse, with whom he lives with. Uses no DME. Still drives. Still works 4hrs/day. Will cont to follow for any needs. Carmen CutlerRN,ext [id number]. Patient has designated his daughters  to participate in his/her discharge plan and to receive any needed information. Name:  Mina Molina / Samantha Nieves Address: 
Phone number: [phone] / [phone] Care Management Interventions PCP Verified by CM: (Dr. Maicol Caba, last seen unknown) Palliative Care Criteria Met (RRAT>21 & CHF Dx)?: No 
Mode of Transport at Discharge: Other (see comment)(family) Transition of Care Consult (CM Consult): Discharge Planning Discharge Durable Medical Equipment: No 
Physical Therapy Consult: No 
Occupational Therapy Consult: No 
Speech Therapy Consult: No 
Current Support Network: Lives with Spouse Confirm Follow Up Transport: Self Plan discussed with Pt/Family/Caregiver: Yes Discharge Location Discharge Placement: Home with home health

## 2019-01-03 NOTE — PROGRESS NOTES
Pt admitted earlier this morning for SOB x 8 days, hypoxic on RA - does not wear oxygen at home. Recently admitted to [facility] for renal biopsy and visual changes. Symptoms worse w/ exertion and laying flat, note increased edema BLE. CXR findings suspicious for pulmonary edema superimposed on chronic interstitial lung disease, suspected additional bibasilar superimposed alveolar component although pneumonia is difficult to exclude, probable small pleural effusions. Troponin 0.09 - -> 0.11, creatinine 2.48, BNP 25633. Pt on lasix IV BID, duo-nebs, IV Abx. Echo with EF <15%, LV hypertrophy, LV global hypokinesis, small echogenic and calcified thrombus LV, porcine bioprosthetic aortic valve, trace mitral valve regurgitation. Last documented Echo in [facility] from 2009 with EF 35%. Cardiology consulted. [facility] Pager:  [phone] Office:  [phone]

## 2019-01-03 NOTE — PROGRESS NOTES
Pulmonary Rehab:  Visited pt and gave pt information on COPD, and how Pulmonary Rehab could reduce readmission rates. Pt expressed interest in Outpatient Pulmonary Rehab after discharge. Please evaluate pt and if appropriate fax referal to [phone]

## 2019-01-03 NOTE — H&P
Internal Medicine History and Physical 
   
 
 
Subjective HPI: Jaison Esposito is a 80 y.o. male who presented to the ED with sob over last 8 days that got sig worse tonight. Noted to be 88% on ra, and placed on oxygen with some relief. [facility] for kidney bx, visual changes. Has been compliant with meds. Sx worse with exertion, lying flat. Also with increased swelling legs as well. No meds administered in route other than oxygen. Is not on oxygen at home ED evaluation revealed clinical picture consistent with CHF exacerbation and CXR that revealed pneumonia and pleural effusion as well. Started onAbx in the ED. Will admit for further evaluation and treatment PMHx: 
Past Medical History:  
Diagnosis Date  BPH (benign prostatic hyperplasia)  Congestive heart failure (CHF) ([address]  DM (diabetes mellitus) ([facility] [address]  Hypercholesteremia  Hypertension  PAD (peripheral artery disease) ([facility] [address]  Pulmonary fibrosis ([facility] [address] PSurgHx: 
Past Surgical History:  
Procedure Laterality Date  CARDIAC SURG PROCEDURE UNLIST  HX CATARACT REMOVAL    
 HX ORTHOPAEDIC    
 HX SHOULDER REPLACEMENT SocialHx: 
Social History Socioeconomic History  Marital status:  Spouse name: Not on file  Number of children: Not on file  Years of education: Not on file  Highest education level: Not on file Social Needs  Financial resource strain: Not on file  Food insecurity - worry: Not on file  Food insecurity - inability: Not on file  Transportation needs - medical: Not on file  Transportation needs - non-medical: Not on file Occupational History  Not on file Tobacco Use  Smoking status: Former Smoker  Smokeless tobacco: Never Used Substance and Sexual Activity  Alcohol use: Yes  Drug use: No  
 Sexual activity: Not on file Other Topics Concern  Not on file Social History Narrative  Not on file Allergies: Allergies Allergen Reactions  Protamine Anaphylaxis FamilyHx: 
History reviewed. No pertinent family history. Prior to Admission Medications Prescriptions Last Dose Informant Patient Reported? Taking? Omeprazole delayed release (PRILOSEC D/R) 20 mg tablet   Yes Yes Sig: Take 20 mg by mouth daily. aspirin delayed-release 81 mg tablet   Yes Yes Sig: Take 81 mg by mouth daily. atorvastatin (LIPITOR) 40 mg tablet   Yes Yes Sig: Take 40 mg by mouth daily. carvedilol (COREG) 3.125 mg tablet   Yes Yes Sig: Take  by mouth two (2) times daily (with meals). docusate sodium (COLACE) 100 mg capsule   Yes Yes Sig: Take 100 mg by mouth two (2) times a day. ferrous sulfate 325 mg (65 mg iron) tablet   Yes Yes Sig: Take 325 mg by mouth Daily (before breakfast). furosemide (LASIX) 20 mg tablet   Yes No  
Sig: Take  by mouth daily. multivitamin, tx-iron-ca-min (THERA-M W/ IRON) 9 mg iron-400 mcg tab tablet   Yes Yes Sig: Take 1 Tab by mouth daily. predniSONE (DELTASONE) 20 mg tablet   Yes Yes Sig: Take 20 mg by mouth daily (with breakfast). tamsulosin (FLOMAX) 0.4 mg capsule   Yes Yes Sig: Take 0.4 mg by mouth daily. Facility-Administered Medications: None Review of Systems: 
CONST: fever(-),   chills(-),   Fatigue(+),   malaise(-) SKIN: itching(-),   rash(-) EYES: vision - no change from baseline ENT: ear pain(-),   nasal discharge(-),   sore throat(-),   voice change(-) RESP: SOB(+),   Cough(+),   hemoptysis(-) CV: chest pain(-),   PND(-),   Orthopnea(+),   exertional dyspnea(+),   palpitations(-), syncope(-) 
GI: abd pain(-),   nausea(-),   vomiting(-),   diarrhea(-),   melena(-),   hematemesis(-), hematochesia(-) 
: dysuria(-),   frequency(-),   urgency(-),   hematuria(-) 
MS: arthralgias(-),   myalgias(-) NEURO: speech w/o change,   tremors(-),   seizures(-),   numbness(-),   dizziness(-) Objective Visit Vitals BP (!) 138/105 Pulse 85 Temp 98.1 °F (36.7 °C) Resp 25 Ht 5' 6\" (1.676 m) Wt 91.2 kg (201 lb) SpO2 95% BMI 32.44 kg/m² Physical Exam: 
CONST: NAD,   VSS reviewed SKIN: rashes(-),   ulcers(-) EYES: PERRL,   EOMI,   sclerae w/o jaundice HENT: HEENT,   normal appearing nose and ears,   normal nasal mucosa and turbinates NECK: supple,   no LA,   trachea is midline,   thyroid w/o goiter or palpable nodules RESP: increased respiratory effort,   wheezes(-),   Rales(+),   Rhochi(+),   no consolidation on percussion CHEST: normal axillae,   retractions(-),   use of accessory muscles(-) CV: JVD(-),   carotid bruits(-),   RRR,   gallops(-),   murmurs(-),   edema LE(-),   abd bruits(-),   peripheral pulses normal 
ABD: soft, tender(-),   distended(-),   HSM(-),   BS(+) in all quadrants,   peritoneal signs(-) 
MS: NROM in all extremities,  clubbing(-),   peripheral cyanosis(-) NEURO: speech normal, tremors(-),   CN II-XII(-),   lateralizing signs(-) PSYCH: AOC x 3,   appropriate affect,   non-suicidal 
 
 
Laboratory Studies: All lab results for the last 24 hours reviewed. Imaging Reviewed: 
No results found. Assessment/Plan Active Hospital Problems Diagnosis Date Noted  Acute exacerbation of CHF (congestive heart failure) ([facility] [address] 01/03/2019 Monitor clinically Gentle diuresis Strict I&O Echo in am 
 
  
 HCAP (healthcare-associated pneumonia) 01/03/2019 HCAP Abx coverage with Zosyn, Levaquin and Vanco 
Nebs as needed O2 to maintain pulse ox >91% 
  
 
 
- Cont acceptable home medications for chronic conditions  
- DVT protocol and GI prophylaxis I have personally reviewed all pertinent labs, films and EKGs that have officially resulted. I reviewed available electronic documentation outlining the initial presentation as well as the emergency room physician's encounter.  
 
Total time spent with patient and chart review, orders and documentation - 45min out of which more than 50% spent face-to-face with patient. Irma Veronica MD 
1/3/2019 12:59 AM 
 
 
[facility]

## 2019-01-03 NOTE — ED PROVIDER NOTES
Ayla Gan is a 80 y.o. Male with h/o chf, ef 15%, pulm fibrosis with c/o worsening sob over last 8 days that got sig worse tonight. Noted to be 88% on ra, and placed on oxygen with some relief. Recently admitted to [facility] for kidney bx, visual changes. Has been compliant with meds. Sx worse with exertion, lying flat. Also with increased swelling legs as well. No meds administered in route other than oxygen. Is not on oxygen at home The history is provided by the patient and medical records. Past Medical History:  
Diagnosis Date  BPH (benign prostatic hyperplasia)  Congestive heart failure (CHF) ([facility] [address]  DM (diabetes mellitus) ([facility] [address]  Hypercholesteremia  Hypertension  PAD (peripheral artery disease) ([facility] [address]  Pulmonary fibrosis ([facility] [address] Past Surgical History:  
Procedure Laterality Date  CARDIAC SURG PROCEDURE UNLIST  HX CATARACT REMOVAL    
 HX ORTHOPAEDIC    
 HX SHOULDER REPLACEMENT History reviewed. No pertinent family history. Social History Socioeconomic History  Marital status:  Spouse name: Not on file  Number of children: Not on file  Years of education: Not on file  Highest education level: Not on file Social Needs  Financial resource strain: Not on file  Food insecurity - worry: Not on file  Food insecurity - inability: Not on file  Transportation needs - medical: Not on file  Transportation needs - non-medical: Not on file Occupational History  Not on file Tobacco Use  Smoking status: Former Smoker  Smokeless tobacco: Never Used Substance and Sexual Activity  Alcohol use: Yes  Drug use: No  
 Sexual activity: Not on file Other Topics Concern  Not on file Social History Narrative  Not on file ALLERGIES: Protamine Review of Systems Constitutional: Negative for fever. HENT: Negative for sore throat and trouble swallowing. Eyes: Negative for visual disturbance. Respiratory: Positive for cough, chest tightness and shortness of breath. Cardiovascular: Positive for leg swelling. Negative for chest pain. Gastrointestinal: Negative for abdominal pain. Endocrine: Negative for polyuria. Genitourinary: Negative for difficulty urinating. Musculoskeletal: Positive for gait problem. Skin: Negative for rash. Allergic/Immunologic: Negative for food allergies. Neurological: Negative for syncope. Psychiatric/Behavioral: Positive for sleep disturbance. Vitals:  
 01/02/19 2318 01/02/19 2326 BP: (!) 155/93 (!) 138/105 Pulse: 99 85 Resp: 27 25 Temp: 98.1 °F (36.7 °C) SpO2: 95% 95% Weight: 91.2 kg (201 lb) Height: 5' 6\" (1.676 m) Physical Exam  
Constitutional: He is oriented to person, place, and time. Non-toxic appearance. He appears ill. He appears distressed. HENT:  
Head: Normocephalic and atraumatic. Right Ear: External ear normal.  
Left Ear: External ear normal.  
Nose: Nose normal.  
Mouth/Throat: Oropharynx is clear and moist. No oropharyngeal exudate. Eyes: Conjunctivae are normal.  
Neck: Normal range of motion. Cardiovascular: Normal rate, regular rhythm and intact distal pulses. Murmur heard. Pulmonary/Chest: Accessory muscle usage present. Tachypnea noted. He is in respiratory distress. He has rhonchi. He has rales. Abdominal: Soft. There is no tenderness. Musculoskeletal: Normal range of motion. He exhibits edema (2+). Neurological: He is alert and oriented to person, place, and time. Skin: Skin is warm and dry. He is not diaphoretic. Psychiatric: His behavior is normal.  
Nursing note and vitals reviewed. MDM Procedures Vitals: 
Patient Vitals for the past 12 hrs: 
 Temp Pulse Resp BP SpO2  
01/02/19 2326  85 25 (!) 138/105 95 % 01/02/19 2318 98.1 °F (36.7 °C) 99 27 (!) 155/93 95 % Medications ordered:  
Medications sodium chloride (NS) flush 5-10 mL (not administered) levoFLOXacin (LEVAQUIN) 750 mg in D5W IVPB (750 mg IntraVENous New Bag 1/3/19 0019)  
vancomycin (VANCOCIN) 1,000 mg in 0.9% sodium chloride (MBP/ADV) 250 mL adv (not administered)  
piperacillin-tazobactam (ZOSYN) 3.375 g in 0.9% sodium chloride (MBP/ADV) 100 mL MBP (not administered)  
furosemide (LASIX) injection 40 mg (40 mg IntraVENous Given 1/2/19 2333)  
albuterol-ipratropium (DUO-NEB) 2.5 MG-0.5 MG/3 ML (3 mL Nebulization Given 1/2/19 2332)  
nitroglycerin (NITROBID) 2 % ointment 1 Inch (1 Inch Topical Given 1/2/19 2329) Lab findings: 
Recent Results (from the past 12 hour(s)) EKG, 12 LEAD, INITIAL Collection Time: 01/02/19 11:25 PM  
Result Value Ref Range Ventricular Rate 91 BPM  
 Atrial Rate 91 BPM  
 P-R Interval 176 ms QRS Duration 164 ms Q-T Interval 422 ms QTC Calculation (Bezet) 519 ms Calculated P Axis 14 degrees Calculated R Axis -58 degrees Calculated T Axis 50 degrees Diagnosis Sinus rhythm with marked sinus arrhythmia Left axis deviation Left bundle branch block Abnormal ECG When compared with ECG of 09-SEP-2018 01:41, 
premature ventricular complexes are no longer present NV interval has decreased QRS axis shifted left Nonspecific T wave abnormality no longer evident in Inferior leads CBC WITH AUTOMATED DIFF Collection Time: 01/02/19 11:30 PM  
Result Value Ref Range WBC 11.9 4.6 - 13.2 K/uL  
 RBC 4.09 (L) 4.70 - 5.50 M/uL  
 HGB 11.4 (L) 13.0 - 16.0 g/dL HCT 35.7 (L) 36.0 - 48.0 % MCV 87.3 74.0 - 97.0 FL  
 MCH 27.9 24.0 - 34.0 PG  
 MCHC 31.9 31.0 - 37.0 g/dL  
 RDW 13.4 11.6 - 14.5 % PLATELET 879 792 - 844 K/uL MPV 11.4 9.2 - 11.8 FL  
 NEUTROPHILS 88 (H) 40 - 73 % LYMPHOCYTES 3 (L) 21 - 52 % MONOCYTES 9 3 - 10 % EOSINOPHILS 0 0 - 5 % BASOPHILS 0 0 - 2 %  
 ABS. NEUTROPHILS 10.4 (H) 1.8 - 8.0 K/UL  
 ABS. LYMPHOCYTES 0.4 (L) 0.9 - 3.6 K/UL ABS. MONOCYTES 1.0 0.05 - 1.2 K/UL  
 ABS. EOSINOPHILS 0.0 0.0 - 0.4 K/UL  
 ABS. BASOPHILS 0.0 0.0 - 0.1 K/UL  
 DF AUTOMATED PROTHROMBIN TIME + INR Collection Time: 01/02/19 11:30 PM  
Result Value Ref Range Prothrombin time 13.0 11.5 - 15.2 sec INR 1.0 0.8 - 1.2 METABOLIC PANEL, COMPREHENSIVE Collection Time: 01/02/19 11:30 PM  
Result Value Ref Range Sodium 133 (L) 136 - 145 mmol/L Potassium 4.5 3.5 - 5.5 mmol/L Chloride 98 (L) 100 - 108 mmol/L  
 CO2 26 21 - 32 mmol/L Anion gap 9 3.0 - 18 mmol/L Glucose 135 (H) 74 - 99 mg/dL BUN 48 (H) 7.0 - 18 MG/DL Creatinine 2.48 (H) 0.6 - 1.3 MG/DL  
 BUN/Creatinine ratio 19 12 - 20 GFR est AA 30 (L) >60 ml/min/1.73m2 GFR est non-AA 25 (L) >60 ml/min/1.73m2 Calcium 7.8 (L) 8.5 - 10.1 MG/DL Bilirubin, total 0.4 0.2 - 1.0 MG/DL  
 ALT (SGPT) 59 16 - 61 U/L  
 AST (SGOT) 27 15 - 37 U/L Alk. phosphatase 66 45 - 117 U/L Protein, total 6.4 6.4 - 8.2 g/dL Albumin 3.2 (L) 3.4 - 5.0 g/dL Globulin 3.2 2.0 - 4.0 g/dL A-G Ratio 1.0 0.8 - 1.7 NT-PRO BNP Collection Time: 01/02/19 11:30 PM  
Result Value Ref Range NT pro-BNP 22,842 (H) 0 - 1,800 PG/ML  
MAGNESIUM Collection Time: 01/02/19 11:30 PM  
Result Value Ref Range Magnesium 2.2 1.6 - 2.6 mg/dL TROPONIN I Collection Time: 01/02/19 11:30 PM  
Result Value Ref Range Troponin-I, QT 0.09 (H) 0.0 - 0.045 NG/ML  
POC LACTIC ACID Collection Time: 01/02/19 11:37 PM  
Result Value Ref Range Lactic Acid (POC) 0.68 0.40 - 2.00 mmol/L  
URINALYSIS W/ RFLX MICROSCOPIC Collection Time: 01/03/19 12:07 AM  
Result Value Ref Range Color YELLOW Appearance CLEAR Specific gravity 1.014 1.005 - 1.030    
 pH (UA) 5.0 5.0 - 8.0 Protein 100 (A) NEG mg/dL Glucose NEGATIVE  NEG mg/dL Ketone NEGATIVE  NEG mg/dL Bilirubin NEGATIVE  NEG Blood LARGE (A) NEG Urobilinogen 0.2 0.2 - 1.0 EU/dL  Nitrites NEGATIVE  NEG    
 Leukocyte Esterase TRACE (A) NEG    
 
 
EKG interpretation by ED Physician: 
Sinus with lbbb. No acute st changes Rate 91, pr 176, qtc 80 
lbbb old Cardiac monitor: nl rate, reg rhythm, no ectopy Pulse ox: 95% on 4l nc X-Ray, CT or other radiology findings or impressions: 
XR CHEST PORT    (Results Pending) right pleural effusion, consolidation (present on cxr on 1/1) Progress notes, Consult notes or additional Procedure notes: Will need admission for below. Appears more comfortable now. D/w pt and daughter results, need for admission D/w dr Reza Martinez on call for hospitalist who will admit ED Critical Care Note System at risk for life threatening failure: cardiac, pulm, renal 
Associated problems: hypoxia, renal failure, chf, elevated trop Critical Care services provided: management of chf, pna, xray interp, bedside reassessment discussion, consult, documentation Excluded procedures (time not included in critical care): ecg interp Total Critical Care Time (in minutes) 38 Reevaluation of patient:  
stable Disposition: 
Diagnosis: 1. Acute congestive heart failure, unspecified heart failure type ([address] 2. HCAP (healthcare-associated pneumonia) 3. Hypoxia 4. Acute respiratory disease 5. Chronic kidney disease, unspecified CKD stage Disposition: admit Follow-up Information None Medication List  
  
ASK your doctor about these medications   
aspirin delayed-release 81 mg tablet 
  
atorvastatin 40 mg tablet Commonly known as:  LIPITOR 
  
carvedilol 3.125 mg tablet Commonly known as:  COREG 
  
docusate sodium 100 mg capsule Commonly known as:  COLACE 
  
ferrous sulfate 325 mg (65 mg iron) tablet 
  
furosemide 20 mg tablet Commonly known as:  LASIX 
  
multivitamin, tx-iron-ca-min 9 mg iron-400 mcg Tab tablet Commonly known as:  THERA-M w/ IRON Omeprazole delayed release 20 mg tablet Commonly known as:  PRILOSEC D/R 
  
 predniSONE 20 mg tablet Commonly known as:  DELTASONE 
  
tamsulosin 0.4 mg capsule Commonly known as:  FLOMAX

## 2019-01-03 NOTE — PROGRESS NOTES
conducted an initial consultation and Spiritual Assessment for Chris Barrios, who is a 80 y.o.,male. Patients Primary Language is: [place]. According to the patients EMR Mandaeism Affiliation is: Druze. The reason the Patient came to the hospital is:  
Patient Active Problem List  
 Diagnosis Date Noted  Acute exacerbation of CHF (congestive heart failure) ([facility] [address] 01/03/2019  HCAP (healthcare-associated pneumonia) 01/03/2019 The  provided the following Interventions: 
Initiated a relationship of care and support with patient and family in room 3009. Listened empathically as patient talked about his being here and had many questions about the  that is now here. Shared with them info about Father Yordan's 
Presence and hours  Here. Provided information about Spiritual Care Services. Offered prayer and assurance of continued prayers on patients behalf. The following outcomes were achieved: 
Patient shared limited information about his medical narrative . Joelle Sutherland Patient processed feeling about current hospitalization. Patient expressed gratitude for pastoral care visit. Assessment: 
Patient does not have any Sabianist/cultural needs that will affect patients preferences in health care. There are no further spiritual or Sabianist issues which require Spiritual Care Services interventions at this time. Plan: 
Chaplains will continue to follow and will provide pastoral care on an as needed/requested basis Joelle Weeks 3 Board Certified [employer] Spiritual Care  
[phone]

## 2019-01-03 NOTE — PROGRESS NOTES
Pharmacy Dosing Services: Vancomycin Indication: HAP Day of therapy: 1 Other Antimicrobials (Include dose, start day & day of therapy): Levofloxacin 750 mg UV every 48 hours (started 1/3) Piperacillin/tazobactam 3.375 gm IV every 6 hours (started 1/3) Loading dose (date given): 1750 mg 
Current Maintenance dose: none at this time Goal Vancomycin Level: 15-20 
(Trough 15-20 for most infections, 20 for meningitis/osteomyelitis, pre-HD level ~25) Vancomycin Level (if drawn): none at this time Significant Cultures: pending Renal function stable? (unstable defined as SCr increase of 0.5 mg/dL or > 50% increase from baseline, whichever is greater) (Y/N): N  
 
CAPD, Hemodialysis or Renal Replacement Therapy (Y/N): N Recent Labs 19 
2330 CREA 2.48* BUN 48* WBC 11.9 Temp (24hrs), Av.7 °F (36.5 °C), Min:97.4 °F (36.3 °C), Max:98.1 °F (36.7 °C) Creatinine Clearance (Creatinine Clearance (ml/min)): 23 ml/min Regimen assessment: - New vancomycin start - will dose per level to ensure clearance - Levofloxacin dosed appropriately based on renal function and indication. 
- Adjust pip/tazo to every 8 hours extended infusion Maintenance dose: Dosing per level Next scheduled level: Random on  at 0400 Pharmacy will follow daily and adjust medications as appropriate for renal function and/or serum levels.  
 
Thank you, 
Susana Doan, PHARMD

## 2019-01-03 NOTE — PROGRESS NOTES
01:40- Patient admitted to room 3009 via stretcher from E.R. Alert and oriented x4, No c/o pain/discomfort. Call light in reach. Assessment completed- see flowsheet. 06:00- Patient has history of EF 15%  Dr Ritchie Spencer called and reported EF 15%  Order for cardiac monitoring given.

## 2019-01-03 NOTE — PROGRESS NOTES
Problem: Falls - Risk of 
Goal: *Absence of Falls Document [place] Fall Risk and appropriate interventions in the flowsheet. Outcome: Progressing Towards Goal 
Fall Risk Interventions: 
Mobility Interventions: Patient to call before getting OOB Medication Interventions: Patient to call before getting OOB, Teach patient to arise slowly

## 2019-01-03 NOTE — PROGRESS NOTES
Patient refused breathing treatment at this time. Patient stated he was not SOB and didn't like how the breathing treatment felt when he took it last night. Patient stated he will call if he needs a treatment. MD notified.

## 2019-01-03 NOTE — CDMP QUERY
The medical record reflects the following: 
 
Risk:HTN, DM, CHF, Hypercholesteremia Clinical Indicators: ED notes :  Chronic kidney disease, unspecified CKD stage 1/2- GFR 23  BUN 48 Treatment: Gentle diuresis, Strict I&O Stage I GFR >90 Stage II GFR 60-89 Stage III GFR 30-59 Stage IV GFR 15-29 Stage V GFR <15 Please clarify if this patient is being treated/managed for: 
 
=>CKD, please clarify stage =>SETH, unable to stage CKD 
=>Other Explanation of clinical findings =>Unable to Determine (no explanation of clinical findings) Please clarify and document your clinical opinion in the progress notes and discharge summary. Thank you, [address] [facility] [place], [place],[address], [address]

## 2019-01-03 NOTE — ROUTINE PROCESS
Bedside, Verbal and Written shift change report given to Ivonne Sparks RN (oncoming nurse) by Rosy Dickinson RN (offgoing nurse). Report included the following information SBAR, Kardex, Intake/Output, MAR, Recent Results, Med Rec Status, Procedure Summary and Cardiac Rhythm NSR/SA w/BBB/PAC's/PVC's. 
   
5335 - Shift assessment completed. Pt alert and oriented x4. No respiratory distress noted, pt on 3 liters O2 via n/c. No c/o pain reported. Call bell within reach, bed in low position. Will continue to monitor. 
    
[id number] with Telemetry notified this nurse pt having frequent PVC's. Janie Lee, KWASI on unit and notified - ordered Magnesium add-on. 1146 - Pt c/o feeling SOB, PRN DuoNeb administered. 1225 - Shift re-assessment completed, no change in pt condition. 
   
1445 - Notified by Telemetry Technician Jessica Sierra that pt having frequent PVC's, runs of V-Tach. Paged Dr William Person, awaiting return call back. 1500 - Dr William  and PAULINA Ruth in Telemetry room looking at pt's cardiac rhythm. Ordered EKG and to start pt on Heparin Drip. 1625 - Shift re-assessment completed, no change in pt condition. 
   
1729 - Administered Heparin Bolus 7,300 units to pt. 
 
1732 - Pt started on Heparin Drip at 18 units/kg/hr and verified with Charge Nurse Kenneth Lymna RN. 
 
7159 - Pt and pt's daughter given Coumadin pamphlet from [employer] and educated on taking Coumadin. Bedside, Verbal and Written shift change report given to Cyrus Mcintosh RN (oncoming nurse) by Ivonne Sparks RN (offgoing nurse). Report included the following information SBAR, Kardex, Intake/Output, MAR, Recent Results, Med Rec Status, Procedure Summary and Cardiac Rhythm NSR/SA w/BBB/PAC's/PVC's. Heparin Drip rate verified at 18 units/kg/hr.

## 2019-01-03 NOTE — ED TRIAGE NOTES
Pt brought self to ED for c/o SOB x8 days that worsens with exertion. Pt brought in by EMS on NRB 15lpm, 88% on room air.

## 2019-01-04 PROBLEM — I10 HYPERTENSION: Status: ACTIVE | Noted: 2019-01-01

## 2019-01-04 PROBLEM — I77.82 ANCA-ASSOCIATED VASCULITIS: Status: ACTIVE | Noted: 2019-01-01

## 2019-01-04 PROBLEM — I51.3 LV (LEFT VENTRICULAR) MURAL THROMBUS: Status: ACTIVE | Noted: 2019-01-01

## 2019-01-04 PROBLEM — N17.9 ACUTE RENAL FAILURE SUPERIMPOSED ON STAGE 3 CHRONIC KIDNEY DISEASE (HCC): Status: ACTIVE | Noted: 2019-01-01

## 2019-01-04 PROBLEM — N18.30 ACUTE RENAL FAILURE SUPERIMPOSED ON STAGE 3 CHRONIC KIDNEY DISEASE (HCC): Status: ACTIVE | Noted: 2019-01-01

## 2019-01-04 PROBLEM — I42.9 CARDIOMYOPATHY (HCC): Status: ACTIVE | Noted: 2019-01-01

## 2019-01-04 PROBLEM — I35.0 AORTIC STENOSIS: Status: ACTIVE | Noted: 2019-01-01

## 2019-01-04 NOTE — PROGRESS NOTES
7 [facility] Hospitalist Division Inpatient Daily Progress Note Daily progress Note Patient: Veronica Cramer MRN: [id number]  [facility]: [id number] YOB: 1932  Age: 80 y.o. 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 Oxygen sats 88% on RA and was placed on oxygen with some relief. 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 Pt has been on high dose oral Prednisone and so far has received two doses of Rituximab (last dose on 1/12019). CT chest note pulmonary lesions which are felt to be due to vasculitis vs mets. Work up in the ED - CXR findings suspicious for pulmonary edema superimposed on chronic interstitial lung disease, suspected additional bibasilar superimposed alveolar component although pneumonia is difficult to exclude, probable small pleural effusions. Troponin 0.09 - -> 0.11 - -> 0.11, creatinine 2.48 and BNP 27975 in ED. Pt started on IV diuretics this admission and admitted for further management. Echo obtained 1/3 showed EF < 15%, LV hypertrophy, LV global hypokinesis, small echogenic and calcified thrombus LV, porcine bioprosthetic aortic valve, trace mitral valve regurgitation. Cardiology consulted. Pt started on Heparin gtt and oral Coumadin.   Creatinine increased to 3 on 1/4, nephrology consulted given recent biopsy findings and increasing creatinine given diuresis. Nephrology recommends continue oral prednisone, pt's next dose of Rituximab was supposed to be on 1/8 but will hold until pneumonia is treated. Pt started on Bactrim for PCP prophylaxis. Ok to resume Lasix per nephrology. Assessment/Plan:  
 
Patient Active Problem List  
Diagnosis Code  Acute exacerbation of CHF (congestive heart failure) ([facility]) I50.9  HCAP (healthcare-associated pneumonia) J18.9  LV (left ventricular) mural thrombus I51.3  Cardiomyopathy ([address] I42.9  Acute renal failure superimposed on stage 3 chronic kidney disease ([facility]) N17.9, N18.3  Aortic stenosis I35.0  Hypertension I10  
 ANCA-associated vasculitis ([facility]) I77.6 A/P: 
Acute Exacerbation of CHF /Cardiomyopathy 
- cardiology following- BNP 25325 on admission - IV Diuretics, monitor I/O 
- Echo 1/3 with EF < 15%, LV hypertrophy, LV global hypokinesis, small echogenic and calcified thrombus LV, porcine bioprosthetic aortic valve, trace mitral valve regurgitation 
- resume home meds - per cardiology- will arrange for LifeVest for 3 months for primary prevention HCAP 
- continue IV Abx 
- started on Bactrim per nephrology for PCP prophylaxis 
- supplemental oxygen, duo-nebs, prednisone daily LV Thrombus 
- noted on Echo this admission 
- Heparin gtt - per cardiology- recommend d/c heparin gtt once INR > 1.8 and continue Coumadin for 3-6 months for possible LV thrombus which was noted to be calcified Hx of aortic stenosis and aortic valve replacement 
- started on Coumadin 1/3 
- monitor INR daily - cardiology following Acute on chronic renal failure superimposed on CKD 3 
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 
- continue Prednisone 60 mg daily - pt received two doses of of Rituximab (last dose on 1/12019), next dose was supposed to be on 1/8 but will hold until pneumonia is treated - continue IV diuretics per nephrology 
- monitor I/O's, BMP Pulmonary Nodules 
-CT chest done 12/24/18 with multiple pulmonary and pleural based nodules suspicious for neoplasm 
- was recommended to pursue PET scan at d/c Hypertension 
- continue home meds DVT Prophylaxis - pt on Heparin gtt, Coumadin Victorina Pierre, FNP-C [facility] Hospitalist Division Pager:  [phone] Office:  [phone] Subjective:  
  
Pt examined this morning- late entry of note. Breathing feels a bit better. Sitting in chair w/o difficulty. Daughter at bedside and updated. Objective:  
  
Visit Vitals BP 95/77 Pulse 72 Temp 98.7 °F (37.1 °C) Resp 18 Ht 5' 6\" (1.676 m) Wt 91.2 kg (201 lb) SpO2 91% BMI 32.44 kg/m² Physical Exam: 
General appearance: alert, cooperative, no distress, appears stated age Head: Normocephalic, without obvious abnormality, atraumatic Lungs: clear to auscultation bilaterally Heart: regular rate and rhythm, S1, S2 normal, no murmur, click, rub or gallop Abdomen: soft, non tender, non distended. Normoactive bowel sounds. Extremities: extremities normal, atraumatic, no cyanosis or edema Skin: scattered bruising BUE Neurologic: Grossly normal 
PSY: Mood and affect normal, appropriately behaved Intake and Output: 
Current Shift:  01/04 0701 - 01/04 1900 In: -  
Out: 363 [[id number]] Last three shifts:  01/02 1901 - 01/04 0700 In: 1629.8 [P.O.:840; I.V.:789.8] Out: 2000 [[id number]] Recent Results (from the past 24 hour(s)) EKG, 12 LEAD, SUBSEQUENT Collection Time: 01/03/19  3:31 PM  
Result Value Ref Range Ventricular Rate 82 BPM  
 Atrial Rate 82 BPM  
 P-R Interval 180 ms QRS Duration 164 ms Q-T Interval 440 ms QTC Calculation (Bezet) 514 ms Calculated P Axis 5 degrees Calculated R Axis 56 degrees Calculated T Axis 13 degrees Diagnosis Sinus rhythm with marked sinus arrhythmia with occasional premature  
ventricular complexes Left bundle branch block Abnormal ECG When compared with ECG of 02-JAN-2019 23:25, 
premature ventricular complexes are now present QRS axis shifted right Confirmed by [facility] ([id number]) on 1/4/2019 8:38:42 AM 
  
PTT Collection Time: 01/03/19  3:45 PM  
Result Value Ref Range aPTT 24.4 23.0 - 36.4 SEC PROTHROMBIN TIME + INR Collection Time: 01/03/19  3:45 PM  
Result Value Ref Range Prothrombin time 13.5 11.5 - 15.2 sec INR 1.1 0.8 - 1.2 CARDIAC PANEL,(CK, CKMB & TROPONIN) Collection Time: 01/03/19  3:45 PM  
Result Value Ref Range CK 53 39 - 308 U/L  
 CK - MB 2.8 <3.6 ng/ml CK-MB Index 5.3 (H) 0.0 - 4.0 % Troponin-I, QT 0.11 (H) 0.0 - 0.045 NG/ML  
CBC WITH AUTOMATED DIFF Collection Time: 01/03/19  3:45 PM  
Result Value Ref Range WBC 13.3 (H) 4.6 - 13.2 K/uL  
 RBC 3.82 (L) 4.70 - 5.50 M/uL  
 HGB 10.6 (L) 13.0 - 16.0 g/dL HCT 33.7 (L) 36.0 - 48.0 % MCV 88.2 74.0 - 97.0 FL  
 MCH 27.7 24.0 - 34.0 PG  
 MCHC 31.5 31.0 - 37.0 g/dL  
 RDW 13.8 11.6 - 14.5 % PLATELET 334 084 - 402 K/uL MPV 11.9 (H) 9.2 - 11.8 FL  
 NEUTROPHILS 90 (H) 40 - 73 % LYMPHOCYTES 9 (L) 21 - 52 % MONOCYTES 1 (L) 3 - 10 % EOSINOPHILS 0 0 - 5 % BASOPHILS 0 0 - 2 %  
 ABS. NEUTROPHILS 12.1 (H) 1.8 - 8.0 K/UL  
 ABS. LYMPHOCYTES 1.2 0.9 - 3.6 K/UL  
 ABS. MONOCYTES 0.1 0.05 - 1.2 K/UL  
 ABS. EOSINOPHILS 0.0 0.0 - 0.4 K/UL  
 ABS. BASOPHILS 0.0 0.0 - 0.1 K/UL  
 DF AUTOMATED    
PTT Collection Time: 01/03/19 11:30 PM  
Result Value Ref Range aPTT >180.0 (HH) 23.0 - 36.4 SEC Aida Heaps Collection Time: 01/04/19  6:44 AM  
Result Value Ref Range Vancomycin, random 12.0 5.0 - 74.9 UG/ML  
METABOLIC PANEL, BASIC Collection Time: 01/04/19  6:44 AM  
Result Value Ref Range Sodium 140 136 - 145 mmol/L  Potassium 3.9 3.5 - 5.5 mmol/L  
 Chloride 99 (L) 100 - 108 mmol/L  
 CO2 32 21 - 32 mmol/L Anion gap 9 3.0 - 18 mmol/L Glucose 91 74 - 99 mg/dL BUN 46 (H) 7.0 - 18 MG/DL Creatinine 3.04 (H) 0.6 - 1.3 MG/DL  
 BUN/Creatinine ratio 15 12 - 20 GFR est AA 24 (L) >60 ml/min/1.73m2 GFR est non-AA 20 (L) >60 ml/min/1.73m2 Calcium 8.0 (L) 8.5 - 10.1 MG/DL  
CBC WITH AUTOMATED DIFF Collection Time: 01/04/19  6:44 AM  
Result Value Ref Range WBC 14.7 (H) 4.6 - 13.2 K/uL  
 RBC 4.16 (L) 4.70 - 5.50 M/uL  
 HGB 11.7 (L) 13.0 - 16.0 g/dL HCT 36.4 36.0 - 48.0 % MCV 87.5 74.0 - 97.0 FL  
 MCH 28.1 24.0 - 34.0 PG  
 MCHC 32.1 31.0 - 37.0 g/dL  
 RDW 13.8 11.6 - 14.5 % PLATELET 163 034 - 616 K/uL MPV 12.3 (H) 9.2 - 11.8 FL  
 NEUTROPHILS 83 (H) 40 - 73 % LYMPHOCYTES 6 (L) 21 - 52 % MONOCYTES 11 (H) 3 - 10 % EOSINOPHILS 0 0 - 5 % BASOPHILS 0 0 - 2 %  
 ABS. NEUTROPHILS 12.2 (H) 1.8 - 8.0 K/UL  
 ABS. LYMPHOCYTES 0.9 0.9 - 3.6 K/UL  
 ABS. MONOCYTES 1.6 (H) 0.05 - 1.2 K/UL  
 ABS. EOSINOPHILS 0.1 0.0 - 0.4 K/UL  
 ABS. BASOPHILS 0.0 0.0 - 0.1 K/UL  
 DF AUTOMATED PROTHROMBIN TIME + INR Collection Time: 01/04/19  6:44 AM  
Result Value Ref Range Prothrombin time 14.6 11.5 - 15.2 sec INR 1.2 0.8 - 1.2 PTT Collection Time: 01/04/19  6:44 AM  
Result Value Ref Range aPTT >180.0 (HH) 23.0 - 36.4 SEC MAGNESIUM Collection Time: 01/04/19  6:44 AM  
Result Value Ref Range Magnesium 2.2 1.6 - 2.6 mg/dL PTT Collection Time: 01/04/19  2:00 PM  
Result Value Ref Range aPTT 94.2 (H) 23.0 - 36.4 SEC Lab Results Component Value Date/Time Glucose 91 01/04/2019 06:44 AM  
 Glucose 86 01/03/2019 08:05 AM  
 Glucose 135 (H) 01/02/2019 11:30 PM  
 Glucose 103 (H) 09/09/2018 01:40 AM  
  
 
Imaging: No results found. Medication List Reviewed: 
Current Facility-Administered Medications Medication Dose Route Frequency  [START ON 1/5/2019] levoFLOXacin (LEVAQUIN) 500 mg in D5W IVPB  500 mg IntraVENous Q48H  piperacillin-tazobactam (ZOSYN) 3.375 g in 0.9% sodium chloride (MBP/ADV) 100 mL MBP  #EXTENDED 4-HOUR INFUSION##  3.375 g IntraVENous Q12H  
 [START ON 1/5/2019] VANCOMYCIN INFORMATION NOTE   Other ONCE  
 WARFARIN INFORMATION NOTE (COUMADIN)   Other PRN  
 trimethoprim-sulfamethoxazole (BACTRIM, SEPTRA)  mg per tablet 1 Tab  1 Tab Oral Q MON, WED & FRI  furosemide (LASIX) tablet 40 mg  40 mg Oral BID  [START ON 1/5/2019] predniSONE (DELTASONE) tablet 60 mg  60 mg Oral DAILY WITH BREAKFAST  metoprolol tartrate (LOPRESSOR) tablet 25 mg  25 mg Oral Q12H  
 sodium chloride (NS) flush 5-10 mL  5-10 mL IntraVENous PRN  
 VANCOMYCIN INFORMATION NOTE   Other Rx Dosing/Monitoring  atorvastatin (LIPITOR) tablet 40 mg  40 mg Oral DAILY  docusate sodium (COLACE) capsule 100 mg  100 mg Oral BID  ferrous sulfate tablet 325 mg  325 mg Oral ACB  multivitamin, tx-iron-ca-min (THERA-M w/ IRON) tablet 1 Tab  1 Tab Oral DAILY  pantoprazole (PROTONIX) tablet 20 mg  20 mg Oral DAILY  tamsulosin (FLOMAX) capsule 0.4 mg  0.4 mg Oral DAILY  acetaminophen (TYLENOL) tablet 650 mg  650 mg Oral Q4H PRN  
 ondansetron (ZOFRAN) injection 4 mg  4 mg IntraVENous Q4H PRN  
 albuterol-ipratropium (DUO-NEB) 2.5 MG-0.5 MG/3 ML  3 mL Nebulization Q4H PRN  
 heparin 25,000 units in D5W 250 ml infusion  18-36 Units/kg/hr IntraVENous TITRATE  warfarin (COUMADIN) tablet 5 mg  5 mg Oral QPM

## 2019-01-04 NOTE — CONSULTS
Consult Note Assessment: · SETH on CKD 3. Current worsening of renal function is likely due to decompensated HFrEF and cardiorenal syndrome. Underling seth/ckd are due to biopsy proven anca associated crescentic GN. · Anca associated crescentic GN/microscopic polyangiitis. · Decompensated HFrEF/volume overload. Improving. · Nosocomial pneumonia, on abx. 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 · Underlying pulm fibrosis. · HTN, controlled. · LV thrombus, on ac with heparin/coumadin. Recommendations:  
· Continue oral prednisone. Next infusion of rituximab is supposed to be on 1/8 but will hold off till pneumonia is treated. Will add bactrim for pcp prophylaxis. · Will resume lasix. · Continue current antihtn. · Avoid NSAID's, IV dye. · Avoid Gadolinium due to its association with nephrogenic systemic fibrosis in a patients with severe ARF and ESRD. · Avoid fleets enemas due to concern for acute phosphate nephropathy. · Please dose all medications for approximate creatinine clearance  30-15. Thank you. Consult requested by: Tabby Lepe MD 
 
ADMIT DATE: 1/2/2019  CONSULT DATE: January 4, 2019 Admission diagnosis: Acute exacerbation of CHF (congestive heart failure) ([facility] [address] Reason for Nephrology Consultation: seth on ckd 3/crescentic GN. HPI: Leslye Ulrich is a 80 y.o. male [facility] with h/o of htn, dm, cad, s/p avr, systolic chf,  ra, pulm fibrosis and recent diagnosis of anca neg vasculitis and seth on ckd 3. Patient underwent kidney biopsy on 12/31 which confirmed acute crescentic GN. Patient has been on high dose oral prednisone and so far has received 2 doses of rituximab, second being on 1/1/2019. He was also recently found to have drop of ef down to 15% and pulm lesions which are felt to be due to vasculitis vs metastasis. Over past 2-3 days pta patient has developed worsening sob and ankle edema.  He presented to [place] [facility] ED where he was found to be hypoxic. Patient was admitted, he feels better with diuresis and with abx for pneumonia. He was found to have lv thrombus and started on heparin drip. Most recently scr has been in the low 2's. Upon admission scr was 2.48, today it is up to 3.04. Past Medical History:  
Diagnosis Date  ANCA-associated vasculitis ([facility] [address]  BPH (benign prostatic hyperplasia)  CKD (chronic kidney disease) stage 3, GFR 30-59 ml/min ([facility])  [facility]  Crescentic glomerulonephritis   
 biopsy 12/31/2018  DM (diabetes mellitus) ([facility]  Hypercholesteremia  Hypertension  PAD (peripheral artery disease) ([facility]  Pulmonary fibrosis ([facility] Past Surgical History:  
Procedure Laterality Date  CARDIAC SURG PROCEDURE UNLIST  HX CATARACT REMOVAL    
 HX ORTHOPAEDIC    
 HX SHOULDER REPLACEMENT Social History Socioeconomic History  Marital status:  Spouse name: Not on file  Number of children: Not on file  Years of education: Not on file  Highest education level: Not on file Social Needs  Financial resource strain: Not on file  Food insecurity - worry: Not on file  Food insecurity - inability: Not on file  Transportation needs - medical: Not on file  Transportation needs - non-medical: Not on file Occupational History  Not on file Tobacco Use  Smoking status: Former Smoker  Smokeless tobacco: Never Used Substance and Sexual Activity  Alcohol use: Yes  Drug use: No  
 Sexual activity: Not on file Other Topics Concern  Not on file Social History Narrative  Not on file History reviewed. No pertinent family history. Allergies Allergen Reactions  Protamine Anaphylaxis Home Medications:  
 
Medications Prior to Admission Medication Sig  
 aspirin delayed-release 81 mg tablet Take 81 mg by mouth daily.  atorvastatin (LIPITOR) 40 mg tablet Take 40 mg by mouth daily.  multivitamin, tx-iron-ca-min (THERA-M W/ IRON) 9 mg iron-400 mcg tab tablet Take 1 Tab by mouth daily.  docusate sodium (COLACE) 100 mg capsule Take 100 mg by mouth two (2) times a day.  Omeprazole delayed release (PRILOSEC D/R) 20 mg tablet Take 20 mg by mouth daily.  predniSONE (DELTASONE) 20 mg tablet Take 20 mg by mouth daily (with breakfast).  tamsulosin (FLOMAX) 0.4 mg capsule Take 0.4 mg by mouth daily.  furosemide (LASIX) 20 mg tablet Take  by mouth daily.  carvedilol (COREG) 3.125 mg tablet Take  by mouth two (2) times daily (with meals).  ferrous sulfate 325 mg (65 mg iron) tablet Take 325 mg by mouth Daily (before breakfast). Current Inpatient Medications:  
 
Current Facility-Administered Medications Medication Dose Route Frequency  [START ON 1/5/2019] levoFLOXacin (LEVAQUIN) 500 mg in D5W IVPB  500 mg IntraVENous Q48H  piperacillin-tazobactam (ZOSYN) 3.375 g in 0.9% sodium chloride (MBP/ADV) 100 mL MBP  #EXTENDED 4-HOUR INFUSION##  3.375 g IntraVENous Q12H  
 [START ON 1/5/2019] VANCOMYCIN INFORMATION NOTE   Other ONCE  
 WARFARIN INFORMATION NOTE (COUMADIN)   Other PRN  
 sodium chloride (NS) flush 5-10 mL  5-10 mL IntraVENous PRN  
 VANCOMYCIN INFORMATION NOTE   Other Rx Dosing/Monitoring  aspirin delayed-release tablet 81 mg  81 mg Oral DAILY  atorvastatin (LIPITOR) tablet 40 mg  40 mg Oral DAILY  carvedilol (COREG) tablet 3.125 mg  3.125 mg Oral BID WITH MEALS  docusate sodium (COLACE) capsule 100 mg  100 mg Oral BID  ferrous sulfate tablet 325 mg  325 mg Oral ACB  multivitamin, tx-iron-ca-min (THERA-M w/ IRON) tablet 1 Tab  1 Tab Oral DAILY  pantoprazole (PROTONIX) tablet 20 mg  20 mg Oral DAILY  predniSONE (DELTASONE) tablet 20 mg  20 mg Oral DAILY WITH BREAKFAST  tamsulosin (FLOMAX) capsule 0.4 mg  0.4 mg Oral DAILY  acetaminophen (TYLENOL) tablet 650 mg  650 mg Oral Q4H PRN  
 ondansetron (ZOFRAN) injection 4 mg  4 mg IntraVENous Q4H PRN  
 VANCOMYCIN INFORMATION NOTE   Other ONCE  
 albuterol-ipratropium (DUO-NEB) 2.5 MG-0.5 MG/3 ML  3 mL Nebulization Q4H PRN  
 heparin 25,000 units in D5W 250 ml infusion  18-36 Units/kg/hr IntraVENous TITRATE  warfarin (COUMADIN) tablet 5 mg  5 mg Oral QPM  
 
 
Review of Systems: No fever or chills. No sore throat. No cough or hemoptysis. No chest pain. C/o recent onset of 2-3 pillow orthopnea and paroxysmal nocturnal dyspnea. Good appetite. No nausea, vomiting, abdominal pain, melena or hematochezia. No constipation or diarrhea. No dysuria, no gross hematuria of voiding difficulties. No joint paints. No muscle aches. No skin changes. No dizziness or lightheadedness. No headaches. Physical Assessment:  
 
Vitals:  
 01/03/19 2333 01/04/19 9729 01/04/19 5258 01/04/19 3959 BP: 103/83 151/58  104/73 Pulse: 90 74  79 Resp: 20 20  18 Temp: 98.2 °F (36.8 °C) 97.8 °F (36.6 °C)  98.1 °F (36.7 °C) SpO2:  93% 94% 90% Weight:      
Height:      
 
Last 3 Recorded Weights in this Encounter 01/02/19 2318 01/03/19 0546 01/03/19 7134 Weight: 91.2 kg (201 lb) 93.6 kg (206 lb 5.6 oz) 91.2 kg (201 lb) Admission weight: Weight: 91.2 kg (201 lb) (01/02/19 2318) Intake/Output Summary (Last 24 hours) at 1/4/2019 1154 Last data filed at 1/4/2019 0596 Gross per 24 hour Intake 889.83 ml Output 1200 ml Net -310.17 ml Patient is in no apparent distress. HEENT: Head is normocephalic and atraumatic. Pupils are round, equal, reactive to light. Sclerae are anicteric. Oropharynx clear. Neck: no cervical lymphadenopathy or thyromegaly. Lungs: diminished air entry at the bases, rare bibasilar crackles. Trachea at the midline. Cardiovascular system: S1, S2, regular rate and rhythm. No murmurs, gallops or rubs. No jvd. Carotid upstroke 1 + bilaterally. Abdomen: obese, soft, non tender, non distended. Positive bowel sounds. No hepatosplenomegaly. No abdominal bruits. Extremities: no clubbing, cyanosis. Trace bl le edema. Strong dorsalis pedis pulses. Brisk capillary refill on the toes bilaterally. Integumentary: skin is grossly intact. Neurologic: Alert, oriented time three. Cooperative and appropriate. No gross motor or sensory deficits. Data Review: 
 
Labs: Results:  
   
Chemistry Recent Labs 01/04/19 
9315 01/03/19 
0805 01/02/19 
2330 GLU 91 86 135*  135* 133* K 3.9 3.9 4.5  
CL 99* 99* 98* CO2 32 28 26 BUN 46* 43* 48* CREA 3.04* 2.35* 2.48* CA 8.0* 7.4* 7.8* AGAP 9 8 9 BUCR 15 18 19 AP  --  51 66 TP  --  5.6* 6.4 ALB  --  2.6* 3.2*  
GLOB  --  3.0 3.2 AGRAT  --  0.9 1.0  
  
  
CBC w/Diff Recent Labs 01/04/19 
1214 01/03/19 
1545 01/03/19 
0805 WBC 14.7* 13.3* 11.1 RBC 4.16* 3.82* 3.72* HGB 11.7* 10.6* 10.4* HCT 36.4 33.7* 32.5*  
 164 150 GRANS 83* 90* 82* LYMPH 6* 9* 7* EOS 0 0 0 Iron/Ferritin No results for input(s): IRON in the last 72 hours. No lab exists for component: TIBCCALC  
PTH/VIT D No results for input(s): PTH in the last 72 hours. No lab exists for component: VITD Juan F Martinez M.D [facility] Office [phone] Pager [phone] January 4, 2019

## 2019-01-04 NOTE — PROGRESS NOTES
Chart reviewed. Plan remains home with home health for [facility] visit when medically stable. Will cont to follow for further needs. Carmen Cutler RN,ext [phone].

## 2019-01-04 NOTE — PROGRESS NOTES
Problem: Falls - Risk of 
Goal: *Absence of Falls Document Alka Tarango Fall Risk and appropriate interventions in the flowsheet. Outcome: Progressing Towards Goal 
Fall Risk Interventions: 
Mobility Interventions: Patient to call before getting OOB Medication Interventions: Teach patient to arise slowly, Patient to call before getting OOB

## 2019-01-04 NOTE — PROGRESS NOTES
[facility] referral sent to [facility] via Epic and called to intake rep, Porfirio Lang, as a pending dc. Care Management Interventions PCP Verified by CM: (Dr. Alex Haro, last seen unknown) Palliative Care Criteria Met (RRAT>21 & CHF Dx)?: No 
Mode of Transport at Discharge: Other (see comment)(family) Transition of Care Consult (CM Consult): Home Health([facility]) [address]: Yes Discharge Durable Medical Equipment: No 
Physical Therapy Consult: No 
Occupational Therapy Consult: No 
Speech Therapy Consult: No 
Current Support Network: Lives with Spouse Confirm Follow Up Transport: Self Plan discussed with Pt/Family/Caregiver: Yes Discharge Location Discharge Placement: Home with home health

## 2019-01-04 NOTE — ROUTINE PROCESS
1915 Bedside and Verbal shift change report given to Tr Eldridge RN (oncoming nurse) by Eloy Gilford, RN 
 (offgoing nurse). Report included the following information Kardex, MAR and Recent Results.

## 2019-01-04 NOTE — PROGRESS NOTES
Problem: Falls - Risk of 
Goal: *Absence of Falls Document Kristian Mitchell Fall Risk and appropriate interventions in the flowsheet. Outcome: Progressing Towards Goal 
Fall Risk Interventions: 
Mobility Interventions: Patient to call before getting OOB Medication Interventions: Teach patient to arise slowly, Patient to call before getting OOB

## 2019-01-04 NOTE — PROGRESS NOTES
[facility]  -  Progress Note Patient: Caden Stage MRN: [id number]  SSN: [id number] YOB: 1932  Age: 80 y.o. Sex: male Admit Date: 1/2/2019 I saw, evaluated, interviewed and examined the patient personally. I agree with the findings and plan of care as documented below with PA-C note CHF, Acute on chronic CKD, EKG with sinsu and PACs Telemonitor also with most likely sinus with frequent PAC and PVC Changing coreg to Metoprolol with more cardioselective effect. Also frequent NSVT, K and Mg level ok Will arrange for LifeVest for 3 months for primary prevention with severe LV dysfunction and NSVT. Also with RECENT CT showing ?? Pulmonary mets. Would recommend pulmonary evaluation and management. DC Heparin once INR more than 1.8. Would recommend coumadin for 3-6 month for possible LV thrombus which was noted to be calcified. Audrey Duarte MD 
 
 
 
Assessment:  
 
- Cardiology consult due to acute on chronic CHF 
- Echo 01/03/19 with EF <15%, LV global hypokinesis, small calcified thrombus in LV (fixed and located in apex). Porcine bioprosthetic aortic valve. - Echo 12/22/18 with EF 15% - Hx of cardiomyopathy with echocardiogram 2/13/09 demonstrating an EF of 35% with  
improvement of ejection fraction to 45% by echocardiography post aortic valve  
replacement about March 2010.  
- Cardiac catheterization 4/2/09 demonstrating severe aortic stenosis with mild coronary disease, 50% stenosis of the posterolateral OM  
- Atrial fibrillation with RVR, CHADS2-Vasc score 8 (CHF, HTN, Age, DM, Possible recent TIA, Vascular disease) - CKD III 
- Recent hospital admission d/c 01/01/19 with poss TIA with transient vision loss, seen by neuro and vascular surgery, deemed likely ischemic 
- CT chest done 12/24/18 with multiple pulmonary and pleural based nodules suspicious for neoplasm.  It was recommended to pursue PET scan at d/c 
 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 
- Hx of Severe aortic stenosis s/p aortic valve replacement with a 23 mm Reggie-Diaz porcine valve and closure of a patent foramen ovale 4/17/09.  
- Hypertension  
- Diabetes Mellitus - Remote hx tobacco use Plan:  
 
-D/c IV Lasix this AM with worsening renal function, Cr up to 3.04 this morning. Nephrology assistance appreciated. -Mg added to this AM's labs due to several runs of NSVT up to 13 beats x 2. Would recommend to keep K > 4 and Mg > 2. 
-Discussed LifeVest with patient due to cardiomyopathy with EF < 15% along with runs of NSVT, pt is in agreement. Will arrange. 
-Pt is continued on heparin gtts and Coumadin for anticoagulation. Subjective: No new complaints. Objective:  
  
Patient Vitals for the past 8 hrs: 
 Temp Pulse Resp BP SpO2  
01/04/19 1218 98.7 °F (37.1 °C) 72 18 95/77 91 % 01/04/19 0823 98.1 °F (36.7 °C) 79 18 104/73 90 % 01/04/19 0652     94 % 01/04/19 0439 97.8 °F (36.6 °C) 74 20 151/58 93 % Patient Vitals for the past 96 hrs: 
 Weight 01/03/19 0950 201 lb (91.2 kg) 01/03/19 0546 206 lb 5.6 oz (93.6 kg) 01/02/19 2318 201 lb (91.2 kg) Intake/Output Summary (Last 24 hours) at 1/4/2019 1236 Last data filed at 1/4/2019 1148 Gross per 24 hour Intake 889.83 ml Output 1800 ml Net -910.17 ml Physical Exam: 
General:  alert, cooperative, no distress, appears stated age Neck:  No JVD Lungs:  clear to auscultation bilaterally Heart:  irregular rhythm Abdomen:  abdomen is soft without significant tenderness, masses, organomegaly or guarding Extremities:  Atraumatic, + edema Data Review:  
 
Labs: Results:  
   
Chemistry Recent Labs 01/04/19 
8584 01/03/19 
0805 01/02/19 
2330 GLU 91 86 135*  135* 133* K 3.9 3.9 4.5  
CL 99* 99* 98* CO2 32 28 26 BUN 46* 43* 48* CREA 3.04* 2.35* 2.48* CA 8.0* 7.4* 7.8*  
 MG 2.2 1.9 2.2 AGAP 9 8 9 BUCR 15 18 19 AP  --  51 66 TP  --  5.6* 6.4 ALB  --  2.6* 3.2*  
GLOB  --  3.0 3.2 AGRAT  --  0.9 1.0  
  
CBC w/Diff Recent Labs 01/04/19 
4427 01/03/19 
1545 01/03/19 
0805 WBC 14.7* 13.3* 11.1 RBC 4.16* 3.82* 3.72* HGB 11.7* 10.6* 10.4* HCT 36.4 33.7* 32.5*  
 164 150 GRANS 83* 90* 82* LYMPH 6* 9* 7* EOS 0 0 0 Cardiac Enzymes Lab Results Component Value Date/Time CPK 53 01/03/2019 03:45 PM  
 CKMB 2.8 01/03/2019 03:45 PM  
 CKND1 5.3 (H) 01/03/2019 03:45 PM  
 TROIQ 0.11 (H) 01/03/2019 03:45 PM  
  
Coagulation Recent Labs 01/04/19 
9841 01/03/19 
2330 01/03/19 
1545 PTP 14.6  --  13.5 INR 1.2  --  1.1 APTT >180.0* >180.0* 24.4 Liver Enzymes Recent Labs 01/03/19 
0805 TP 5.6* ALB 2.6* AP 51 SGOT 20

## 2019-01-04 NOTE — ROUTINE PROCESS
1925: Assumed care. Awake. On oxygen at 4 LPM. Dyspneic on exertion noted. Denies any pain or discomfort at this time. Call light within reach. On Heparin gtt at 18 units/kg/hr or 16.4 ml/hr. Infusing well to right [facility].  
 
2029: Placed another PIV line to right hand gauge # 24.  
 
2037: Due med given. 2333: No change from previous assessment. 0040: Lab called. APTT >180. Heparin gtt stopped. 0140: Heparin gtt restarted. Rate adjusted per protocol. 0320: Sleeping. 
 
0425: Due med given. 0200: Medicated for pain per patient request. 
 
0630: Slept on & off thru night. Needs attended. 3479: Bedside and Verbal shift change report given to Adrianna Wahl Dr. (oncoming nurse) by me (offgoing nurse). Report included the following information SBAR, Kardex, Intake/Output and Recent Results.

## 2019-01-04 NOTE — CDMP QUERY
The medical record reflects the following: 
 
Admitted  with dx of Acute exacerbation of CHF Clinical Indicators: PMH of  CHF 
1/3 Echo 01/03/19 with EF <15%, LV global hypokinesis, small calcified thrombus in LV (fixed and located in apex). Treatment: cards consult, echo,   IV Lasix, strict I&O's, low dose Coreg Please clarify CHF  as : 
· Acute or Chronic · Systolic, Diastolic, Combination =>Other Explanation of clinical findings =>Unable to Determine (no explanation of clinical findings) Please clarify and document your clinical opinion in the progress notes and discharge summary. Thank you, [address] [facility], [address]

## 2019-01-04 NOTE — PROGRESS NOTES
Pharmacy Dosing Services: Vancomycin Indication: HAP Day of therapy: 2 Other Antimicrobials (Include dose, start day & day of therapy): Levofloxacin 750 mg IV then 500 mg IV every 48 hours (started 1/3) Piperacillin/tazobactam 3.375 gm IV every 12 hours extended infusion (started 1/3) Loading dose (date given): 1750 mg 
Current Maintenance dose: Dosing per level Goal Vancomycin Level: 15-20 
(Trough 15-20 for most infections, 20 for meningitis/osteomyelitis, pre-HD level ~25) Vancomycin Level (if drawn):  
 -  (Random) Significant Cultures: pending Renal function stable? (unstable defined as SCr increase of 0.5 mg/dL or > 50% increase from baseline, whichever is greater) (Y/N): N  
 
CAPD, Hemodialysis or Renal Replacement Therapy (Y/N): N Recent Labs 19 
4592 19 
1545 19 
0805 19 
2330 CREA 3.04*  --  2.35* 2.48* BUN 46*  --  43* 48* WBC 14.7* 13.3* 11.1 11.9 Temp (24hrs), Av.2 °F (36.8 °C), Min:97.8 °F (36.6 °C), Max:98.7 °F (37.1 °C) Creatinine Clearance (Creatinine Clearance (ml/min)): < 20 ml/min Regimen assessment:  
- Continue dose per level - SCr increasing - Adjust levofloxacin and pip/tazo dosing based on renal function Maintenance dose: Dosing per level Next scheduled level: Random on  at 0400 Pharmacy will follow daily and adjust medications as appropriate for renal function and/or serum levels.  
 
Thank you, 
Sanjay Gray, PHARMD

## 2019-01-05 NOTE — PROGRESS NOTES
Oxygen Walk Test 
Resting on room air Oxygen saturation 91% Ambulating on room air Oxygen Saturation 84% 4 Liters of Oxygen reapplied Oxygen Saturation  96% Dr Neelima Encarnacion informed

## 2019-01-05 NOTE — PROGRESS NOTES
RENAL DAILY PROGRESS NOTE Assessment: · SETH on CKD 3/CRS--Cr up · Anca associated crescentic GN/microscopic polyangiitis. · Decompensated HFrEF/volume overload. Improving. · Nosocomial pneumonia, on abx. 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 · Underlying pulm fibrosis. · HTN, controlled. · LV thrombus, on ac with heparin/coumadin. Plan: 1.continue oral steroids 2.next rituximab dose 1/8 per August Serve 3.continue Lasix but reduce dose to daily and skip today's dose We will see back on Monday Please call w martin Ruby MD 
1/5/2019 
6:12 AM 
Subjective:  
 
Back hurts Breathing same Objective:  
 
Patient Vitals for the past 24 hrs: 
 Temp Pulse Resp BP SpO2  
01/05/19 0407 98.8 °F (37.1 °C) 70 20 123/74 99 % 01/05/19 0017 98 °F (36.7 °C) 66 20 136/70 97 % 01/04/19 2026 97.8 °F (36.6 °C) 89 20 139/77 93 % 01/04/19 1556 98.4 °F (36.9 °C) 74 18 (!) 128/95 98 % 01/04/19 1218 98.7 °F (37.1 °C) 72 18 95/77 91 % 01/04/19 0823 98.1 °F (36.7 °C) 79 18 104/73 90 % 01/04/19 0652     94 % 01/03 0701 - 01/04 1900 In: 2329.6 [P.O.:1320; I.V.:1009.6] Out: 2900 [Urine:2900] Intake/Output Summary (Last 24 hours) at 1/5/2019 2261 Last data filed at 1/5/2019 9293 Gross per 24 hour Intake 1805.86 ml Output 1100 ml Net 705.86 ml Physical Exam: 
NAD HEENT: No JVD Cardiovascular: S1/S2 regular HB 
C/L: CTA melody Abdomen: soft NT/ND Ext: no Edema Data Review:  
 
LABS:  
Hematology:  
Recent Labs 01/05/19 
0330 01/04/19 
4643 01/03/19 
1545 01/03/19 
0805 01/02/19 
2330 WBC 10.4 14.7* 13.3* 11.1 11.9 HGB 10.2* 11.7* 10.6* 10.4* 11.4* HCT 32.5* 36.4 33.7* 32.5* 35.7* Chemistry:  
Recent Labs 01/04/19 
8246 01/03/19 
0805 01/02/19 
2330 BUN 46* 43* 48* CREA 3.04* 2.35* 2.48* CA 8.0* 7.4* 7.8* ALB  --  2.6* 3.2*  
K 3.9 3.9 4.5  135* 133* CL 99* 99* 98* CO2 32 28 26 GLU 91 86 135*

## 2019-01-05 NOTE — PROGRESS NOTES
[facility]  -  Progress Note Patient: Leslye Ulrich MRN: [id number]  SSN: [id number] YOB: 1932  Age: 80 y.o. Sex: male Admit Date: 1/2/2019 Assessment:  
 
- Cardiology consult due to acute on chronic CHF 
- Echo 01/03/19 with EF <15%, LV global hypokinesis, small calcified thrombus in LV (fixed and located in apex). Porcine bioprosthetic aortic valve. - Echo 12/22/18 with EF 15% - Hx of cardiomyopathy with echocardiogram 2/13/09 demonstrating an EF of 35% with  
improvement of ejection fraction to 45% by echocardiography post aortic valve  
replacement about March 2010.  
- Cardiac catheterization 4/2/09 demonstrating severe aortic stenosis with mild coronary disease, 50% stenosis of the posterolateral OM  
- CKD III 
- Recent hospital admission d/c 01/01/19 with poss TIA with transient vision loss, seen by neuro and vascular surgery, deemed likely ischemic 
- CT chest done 12/24/18 with multiple pulmonary and pleural based nodules suspicious for neoplasm. It was recommended to pursue PET scan at d/c 
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 
- Hx of Severe aortic stenosis s/p aortic valve replacement with a 23 mm Reggie-Diaz porcine valve and closure of a patent foramen ovale 4/17/09.  
- Hypertension  
- Diabetes Mellitus - Remote hx tobacco use 
- NSVT on tele monitor. 8-12 beats,. Plan:  
 
EKG with and tele monitor with sinus and PACs Also frequent NSVT, K and Mg level ok Will arrange for LifeVest for 3 months for primary prevention with severe LV dysfunction and NSVT. Also with RECENT CT showing ?? Pulmonary mets. Would recommend pulmonary evaluation and management. DC Heparin once INR more than 1.8. Would recommend coumadin for 3-6 month for possible LV thrombus which was reported to be calcified.  DW patient and daughter at [facility] in detail abotu concern with CT chest findings and need for follow up with pulmonary team to see if has any pulmonary malignancy. They understand we need to clear that first before considering AICD and also currently with pneumonia and also on antibiotics. Starting BB Starting isordil 5 mg BID Avoid ACE/ARB or entresto currently because of worsening renal function> Can considering in future LifeVest today. Patient and daughter agree. Subjective: No new complaints. Mild dyspnea. Overalll stable. Objective:  
  
Patient Vitals for the past 8 hrs: 
 Temp Pulse Resp BP SpO2  
01/05/19 0735 98.6 °F (37 °C) 80 20 129/76 99 % 01/05/19 0407 98.8 °F (37.1 °C) 70 20 123/74 99 % Patient Vitals for the past 96 hrs: 
 Weight 01/03/19 0950 201 lb (91.2 kg) 01/03/19 0546 206 lb 5.6 oz (93.6 kg) 01/02/19 2318 201 lb (91.2 kg) Intake/Output Summary (Last 24 hours) at 1/5/2019 1038 Last data filed at 1/5/2019 1003 Gross per 24 hour Intake 1510.61 ml Output 1350 ml Net 160.61 ml Physical Exam: 
General:  alert, cooperative, no distress, appears stated age Neck:  No JVD Lungs:  clear to auscultation bilaterally Heart:  irregular rhythm Abdomen:  abdomen is soft without significant tenderness, masses, organomegaly or guarding Extremities:  Atraumatic, + edema Data Review:  
 
Labs: Results:  
   
Chemistry Recent Labs 01/05/19 
0330 01/04/19 
5316 01/03/19 
0805 01/02/19 
2330 * 91 86 135*  140 135* 133* K 3.9 3.9 3.9 4.5  
 99* 99* 98* CO2 31 32 28 26 BUN 52* 46* 43* 48* CREA 3.13* 3.04* 2.35* 2.48* CA 7.8* 8.0* 7.4* 7.8*  
MG  --  2.2 1.9 2.2 AGAP 8 9 8 9 BUCR 17 15 18 19 AP  --   --  51 66 TP  --   --  5.6* 6.4 ALB  --   --  2.6* 3.2*  
GLOB  --   --  3.0 3.2 AGRAT  --   --  0.9 1.0  
  
CBC w/Diff Recent Labs 01/05/19 
0330 01/04/19 
0644 01/03/19 
1545 WBC 10.4 14.7* 13.3*  
RBC 3.67* 4.16* 3.82* HGB 10.2* 11.7* 10.6*  
 HCT 32.5* 36.4 33.7*  
 186 164 GRANS 86* 83* 90* LYMPH 7* 6* 9* EOS 0 0 0 Cardiac Enzymes No results found for: CPK, CK, CKMMB, CKMB, RCK3, CKMBT, CKNDX, CKND1, CHRISTO, TROPT, TROIQ, GONZÁLEZ, TROPT, TNIPOC, BNP, BNPP Coagulation Recent Labs 01/05/19 
0330 01/04/19 
1400 01/04/19 
8816 PTP 15.1  --  14.6 INR 1.2  --  1.2 APTT 110.5* 94.2* >180.0* Liver Enzymes Recent Labs 01/03/19 
0805 TP 5.6* ALB 2.6* AP 51 SGOT 20

## 2019-01-05 NOTE — ROUTINE PROCESS
0800 - assumed care of patient - alert and oriented - c/o soreness in left hip. Continues on heparin gtt. Verified with second RN - next aPTT due 1330. 
 
0830 - daughter at bedside  
 
0900 - AM meds administered - Dr. Tia Jamison at bedside and reviewing heparin drip, coumadin and need for INR 1.8 prior to discharge - daughter verbalizes understanding. Dr. Tia Jamison called Life Vest rep. Khang Fields 75 representative at bedside with daughter and patient - orders noted for patient to d/c home - family has questions for MD as to heparin, coumadin and INR - Dr. Kriss Berry notified 1230 - ambulatory to BR with assist for AM care 1440 - per walk test, patient will require oxygen at discharge - Dr. Kriss Berry aware. Patient and daughter feel patient is not a good candidate for the Life Vest - Dr Kriss Berry made aware and to inform Dr. Tia Jamison. Discharge now scheduled for Monday. Family at bedside and aware of change in discharge. 1500 - heparin drip was off for an hour this afternoon for Life Vest fitting and walk test - patient had order to d/c home. Dr. Kriss Berry made aware of time hep gtt off  - patient not going home today - heparin drip resumed at previous rate per MD and will re-check aPTT at 2100. Family at bedside and aware. Family provided a hot water bottle for patient - per Dr. Kriss Berry, [facility] for patient to use for L hip and low back 80 - Dr. Tia Jamison notified that patient and family refuse Life Vest - per Dr. Tia Jamison, start amiodarone, 400 mg, po every day. Ordered. Elliot Griffiths called to  life vest   
 
1715 - patient continues in chair for dinner - family at bedside. [place] rep here to  [address] - visitors leaving for the night - patient assisted back to bed

## 2019-01-05 NOTE — PROGRESS NOTES
30 [facility] with Dr. Zee Ch to clarify Heparin gtt orders. MD stated to resume heparin gtt at previous rate and give coumadin  
5mg as ordered.

## 2019-01-05 NOTE — ROUTINE PROCESS
1938: Assumed care. Awake. On oxygen at 3 LPM. Slight dyspnea noted  on exertion. Denies any pain at this time. On Heparin gtt at 12 units/kg/hr or 10.9 ml/hr. Infusing well to right hand. 2159: Due med given. 2330: Sleeping. 0023: No change from previous assessment. Due med given. 0245: Patient woke up confused. Reoriented to room & surroundings. Medicated for pain per patient request.  
 
0410: No change from previous assessment. Due med given. 0645:Slept good thru night. Needs attended. 0725: Bedside and Verbal shift change report given to Verena Welch (oncoming nurse) by me (offgoing nurse). Report included the following information SBAR, Kardex, Intake/Output, MAR and Med Rec Status.

## 2019-01-06 NOTE — ROUTINE PROCESS
1925: Assumed care. Awake. Quietly resting in bed. On oxygen at 3 LPM. Dyspnea noted on exertion. On Hepatin gtt at 10 units/kg/hr or 9.1 ml/hr. Infusing well to right hand. Call light within reach. 2013: Medicated for pain per patient request. 
 
2156: Due meds given. 2323: No change from previous assessment. Reminded patient to use call bell for assistance. 1824Ihor Boning patient called for help. Found patient kneeling down on the floor. Facing the bed. \" I was just trying to get up. I'm all tangled up here. \" Assessed. Assisted back in bed. V/s taken. Moved all extremitiesReminded again the patient to call for help prior to getting out of bed. Bed alarm on. Informed house supervisor & MD on call. 0350: Again, patient tried to get out of bed in spite of all the advises & warnings. Assisted patient to the chair. Call light & personal use within reach. 2191: Assisted patient to bathroom & back in chair. Due med given. Call light within reach. 0630: Slept on & off thru night. Needs attended. 4101: Bedside and Verbal shift change report given to [address] [facility] (oncoming nurse) by me (offgoing nurse). Report included the following information SBAR, Kardex, Intake/Output, MAR and Recent Results. 0900: Notified Amaury Patel (patient's daughter of the fall )

## 2019-01-06 NOTE — PROGRESS NOTES
[address] [place], [place] [address] Hospitalist Division Inpatient Daily Progress Note Daily progress Note Patient: Ayla Gan MRN: [id number]  [facility]: [id number] YOB: 1932  Age: 80 y.o. 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 Oxygen sats 88% on RA and was placed on oxygen with some relief. 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 Pt has been on high dose oral Prednisone and so far has received two doses of Rituximab (last dose on 1/12019). CT chest note pulmonary lesions which are felt to be due to vasculitis vs mets. Work up in the ED - CXR findings suspicious for pulmonary edema superimposed on chronic interstitial lung disease, suspected additional bibasilar superimposed alveolar component although pneumonia is difficult to exclude, probable small pleural effusions. Troponin 0.09 - -> 0.11 - -> 0.11, creatinine 2.48 and BNP 27987 in ED. Pt started on IV diuretics this admission and admitted for further management. Echo obtained 1/3 showed EF < 15%, LV hypertrophy, LV global hypokinesis, small echogenic and calcified thrombus LV, porcine bioprosthetic aortic valve, trace mitral valve regurgitation. Cardiology consulted. Pt started on Heparin gtt and oral Coumadin.   Creatinine increased to 3 on 1/4, nephrology consulted given recent biopsy findings and increasing creatinine given diuresis. Nephrology recommends continue oral prednisone, pt's next dose of Rituximab was supposed to be on 1/8 but will hold until pneumonia is treated. Pt started on Bactrim for PCP prophylaxis. Ok to resume Lasix per nephrology. Lasix restarted. Patient was ok to go home 1/5 but required 02 and delayed dc in addition life vest was ordered but patient refused. Plan for home 02 tomorrow then dc. Assessment/Plan:  
 
Patient Active Problem List  
Diagnosis Code  Acute exacerbation of CHF (congestive heart failure) ([facility]) I50.9  HCAP (healthcare-associated pneumonia) J18.9  LV (left ventricular) mural thrombus I51.3  Cardiomyopathy ([facility] I42.9  Acute renal failure superimposed on stage 3 chronic kidney disease ([facility]) N17.9, N18.3  Aortic stenosis I35.0  Hypertension I10  
 ANCA-associated vasculitis ([facility]) I77.6 A/P: 
Acute Exacerbation of CHF /Cardiomyopathy 
- cardiology following- BNP 45091 on admission - IV Diuretics, monitor I/O 
- Echo 1/3 with EF < 15%, LV hypertrophy, LV global hypokinesis, small echogenic and calcified thrombus LV, porcine bioprosthetic aortic valve, trace mitral valve regurgitation 
- resume home meds - Patient refuses life vest 
- needs warfarin and heparin bridge. I have ordered scripts already. HCAP 
- continue IV Abx 
- started on Bactrim per nephrology for PCP prophylaxis 
- supplemental oxygen, duo-nebs, prednisone daily LV Thrombus 
- noted on Echo this admission 
- Heparin gtt - Coumadin for 3-6 months for possible LV thrombus which was noted to be calcified Hx of aortic stenosis and aortic valve replacement 
- started on Coumadin 1/3 
- monitor INR daily - cardiology following Acute on chronic renal failure superimposed on CKD 3 
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 
- continue Prednisone 60 mg daily - pt received two doses of of Rituximab (last dose on 1/12019), next dose was supposed to be on 1/8 but will hold until pneumonia is treated 
- continue IV diuretics per nephrology 
- monitor I/O's, BMP Pulmonary Nodules 
-CT chest done 12/24/18 with multiple pulmonary and pleural based nodules suspicious for neoplasm 
- was recommended to pursue PET scan at d/c Hypertension 
- continue home meds DVT Prophylaxis - pt on Heparin gtt, Coumadin Subjective: No complaints, breathing much better , ready for dc tomorrow Objective:  
  
Visit Vitals BP 99/48 (BP 1 Location: Left arm, BP Patient Position: Head of bed elevated (Comment degrees)) Pulse 62 Temp 98.5 °F (36.9 °C) Resp 20 Ht 5' 6\" (1.676 m) Wt 89.8 kg (198 lb) SpO2 94% BMI 31.96 kg/m² Physical Exam: 
General appearance: alert, cooperative, no distress, appears stated age Head: Normocephalic, without obvious abnormality, atraumatic Lungs: clear to auscultation bilaterally Heart: regular rate and rhythm, S1, S2 normal, no murmur, click, rub or gallop Abdomen: soft, non tender, non distended. Normoactive bowel sounds. Extremities: extremities normal, atraumatic, no cyanosis or edema Skin: scattered bruising BUE Neurologic: Grossly normal 
PSY: Mood and affect normal, appropriately behaved Intake and Output: 
Current Shift:  No intake/output data recorded. Last three shifts:  01/04 1901 - 01/06 0700 In: 2499.8 [P.O.:1500; I.V.:999.8] Out: 4420 [[id number]] Recent Results (from the past 24 hour(s)) PTT Collection Time: 01/05/19  8:43 PM  
Result Value Ref Range aPTT 88.5 (H) 23.0 - 36.4 SEC METABOLIC PANEL, BASIC Collection Time: 01/06/19  4:05 AM  
Result Value Ref Range Sodium 141 136 - 145 mmol/L Potassium 4.0 3.5 - 5.5 mmol/L Chloride 104 100 - 108 mmol/L  
 CO2 28 21 - 32 mmol/L Anion gap 9 3.0 - 18 mmol/L Glucose 124 (H) 74 - 99 mg/dL  BUN 59 (H) 7.0 - 18 MG/DL  
 Creatinine 3.59 (H) 0.6 - 1.3 MG/DL  
 BUN/Creatinine ratio 16 12 - 20 GFR est AA 20 (L) >60 ml/min/1.73m2 GFR est non-AA 16 (L) >60 ml/min/1.73m2 Calcium 7.7 (L) 8.5 - 10.1 MG/DL  
CBC WITH AUTOMATED DIFF Collection Time: 01/06/19  4:05 AM  
Result Value Ref Range WBC 14.5 (H) 4.6 - 13.2 K/uL  
 RBC 3.69 (L) 4.70 - 5.50 M/uL  
 HGB 10.2 (L) 13.0 - 16.0 g/dL HCT 32.8 (L) 36.0 - 48.0 % MCV 88.9 74.0 - 97.0 FL  
 MCH 27.6 24.0 - 34.0 PG  
 MCHC 31.1 31.0 - 37.0 g/dL  
 RDW 14.1 11.6 - 14.5 % PLATELET 063 258 - 516 K/uL MPV 12.5 (H) 9.2 - 11.8 FL  
 NEUTROPHILS 87 (H) 40 - 73 % LYMPHOCYTES 9 (L) 21 - 52 % MONOCYTES 4 3 - 10 % EOSINOPHILS 0 0 - 5 % BASOPHILS 0 0 - 2 %  
 ABS. NEUTROPHILS 12.5 (H) 1.8 - 8.0 K/UL  
 ABS. LYMPHOCYTES 1.4 0.9 - 3.6 K/UL  
 ABS. MONOCYTES 0.6 0.05 - 1.2 K/UL  
 ABS. EOSINOPHILS 0.0 0.0 - 0.4 K/UL  
 ABS. BASOPHILS 0.0 0.0 - 0.1 K/UL  
 DF AUTOMATED PROTHROMBIN TIME + INR Collection Time: 01/06/19  4:05 AM  
Result Value Ref Range Prothrombin time 18.2 (H) 11.5 - 15.2 sec INR 1.5 (H) 0.8 - 1.2 PTT Collection Time: 01/06/19  4:05 AM  
Result Value Ref Range aPTT 96.0 (H) 23.0 - 36.4 SEC Lab Results Component Value Date/Time Glucose 124 (H) 01/06/2019 04:05 AM  
 Glucose 115 (H) 01/05/2019 03:30 AM  
 Glucose 91 01/04/2019 06:44 AM  
 Glucose 86 01/03/2019 08:05 AM  
 Glucose 135 (H) 01/02/2019 11:30 PM  
  
 
Imaging: No results found. Medication List Reviewed: 
Current Facility-Administered Medications Medication Dose Route Frequency  amiodarone (NEXTERONE) 360 mg in dextrose 200 mL (1.8 mg/mL) infusion  0.5 mg/min IntraVENous CONTINUOUS  
 furosemide (LASIX) tablet 40 mg  40 mg Oral DAILY  isosorbide dinitrate (ISORDIL) tablet 5 mg  5 mg Oral BID  
 amiodarone (CORDARONE) tablet 400 mg  400 mg Oral DAILY  vancomycin (VANCOCIN) 750 mg in 0.9% sodium chloride (MBP/ADV) 250 mL ADV  750 mg IntraVENous Q24H  
 levoFLOXacin (LEVAQUIN) 500 mg in D5W IVPB  500 mg IntraVENous Q48H  piperacillin-tazobactam (ZOSYN) 3.375 g in 0.9% sodium chloride (MBP/ADV) 100 mL MBP  #EXTENDED 4-HOUR INFUSION##  3.375 g IntraVENous Q12H  WARFARIN INFORMATION NOTE (COUMADIN)   Other PRN  
 trimethoprim-sulfamethoxazole (BACTRIM, SEPTRA)  mg per tablet 1 Tab  1 Tab Oral Q MON, WED & FRI  predniSONE (DELTASONE) tablet 60 mg  60 mg Oral DAILY WITH BREAKFAST  metoprolol tartrate (LOPRESSOR) tablet 25 mg  25 mg Oral Q12H  
 sodium chloride (NS) flush 5-10 mL  5-10 mL IntraVENous PRN  
 VANCOMYCIN INFORMATION NOTE   Other Rx Dosing/Monitoring  atorvastatin (LIPITOR) tablet 40 mg  40 mg Oral DAILY  docusate sodium (COLACE) capsule 100 mg  100 mg Oral BID  ferrous sulfate tablet 325 mg  325 mg Oral ACB  multivitamin, tx-iron-ca-min (THERA-M w/ IRON) tablet 1 Tab  1 Tab Oral DAILY  pantoprazole (PROTONIX) tablet 20 mg  20 mg Oral DAILY  tamsulosin (FLOMAX) capsule 0.4 mg  0.4 mg Oral DAILY  acetaminophen (TYLENOL) tablet 650 mg  650 mg Oral Q4H PRN  
 ondansetron (ZOFRAN) injection 4 mg  4 mg IntraVENous Q4H PRN  
 albuterol-ipratropium (DUO-NEB) 2.5 MG-0.5 MG/3 ML  3 mL Nebulization Q4H PRN  
 heparin 25,000 units in D5W 250 ml infusion  18-36 Units/kg/hr IntraVENous TITRATE  warfarin (COUMADIN) tablet 5 mg  5 mg Oral QPM

## 2019-01-06 NOTE — PROGRESS NOTES
[facility]  -  Progress Note Patient: Muriel Echavarria MRN: [id number]  SSN: [id number] YOB: 1932  Age: 80 y.o. Sex: male Admit Date: 1/2/2019 Assessment:  
 
- Cardiology consult due to acute on chronic CHF 
- Echo 01/03/19 with EF <15%, LV global hypokinesis, small calcified thrombus in LV (fixed and located in apex). Porcine bioprosthetic aortic valve. - Echo 12/22/18 with EF 15% - Hx of cardiomyopathy with echocardiogram 2/13/09 demonstrating an EF of 35% with  
improvement of ejection fraction to 45% by echocardiography post aortic valve  
replacement about March 2010.  
- Cardiac catheterization 4/2/09 demonstrating severe aortic stenosis with mild coronary disease, 50% stenosis of the posterolateral OM  
- CKD III 
- Recent hospital admission d/c 01/01/19 with poss TIA with transient vision loss, seen by neuro and vascular surgery, deemed likely ischemic 
- CT chest done 12/24/18 with multiple pulmonary and pleural based nodules suspicious for neoplasm. It was recommended to pursue PET scan at d/c 
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 
- Hx of Severe aortic stenosis s/p aortic valve replacement with a 23 mm Reggie-Diaz porcine valve and closure of a patent foramen ovale 4/17/09.  
- Hypertension  
- Diabetes Mellitus - Remote hx tobacco use 
- NSVT on tele monitor. 8-12 beats,. Plan:  
 
Continues to have NSVT, asymptomatic. Arranged for LifeVest for 3 months for primary prevention with severe LV dysfunction and NSVT however I was informed by nurse yesterday that patient and family refused it when the company representative came to fit for [address] agreeing earlier in day. Discussed with patient in detail about purpose of LifeVest and risk and benefits.  He states that device is too complicated for him and family to manage and he does not want it desite knowing the risk of NSVT, potential VT/VF and arrest and death. He only wants medication for now and I will start him on IV amiodarone for 18 hour infusion and then oral dose. He is ok with that. Also with RECENT CT showing ?? Pulmonary mets. Would recommend pulmonary evaluation and management. DC Heparin once INR more than 1.8. Would recommend coumadin for 3-6 month for possible LV thrombus which was reported to be calcified. Continue  BB and isordil 5 mg BID Avoid ACE/ARB or entresto currently because of worsening renal function. Can consider in future. Renal function worsening Subjective: No new complaints. Mild dyspnea. Overalll stable. No CP No dizziness or syncope. Objective:  
  
Patient Vitals for the past 8 hrs: 
 Temp Pulse Resp BP SpO2  
01/06/19 0853 97.9 °F (36.6 °C) 82 20 132/69 94 % 01/06/19 0324 97.6 °F (36.4 °C) 84 20 (!) 125/91 95 % Patient Vitals for the past 96 hrs: 
 Weight 01/05/19 1836 198 lb (89.8 kg) 01/03/19 0950 201 lb (91.2 kg) 01/03/19 0546 206 lb 5.6 oz (93.6 kg) 01/02/19 2318 201 lb (91.2 kg) Intake/Output Summary (Last 24 hours) at 1/6/2019 0957 Last data filed at 1/6/2019 2296 Gross per 24 hour Intake 1689 ml Output 800 ml Net 889 ml Physical Exam: 
General:  alert, cooperative, no distress, appears stated age Neck:  No JVD Lungs:  clear to auscultation bilaterally Heart:  irregular rhythm Abdomen:  abdomen is soft without significant tenderness, masses, organomegaly or guarding Extremities:  Atraumatic, + edema Data Review:  
 
Labs: Results:  
   
Chemistry Recent Labs 01/06/19 
0405 01/05/19 
0330 01/04/19 
9325 * 115* 91  141 140  
K 4.0 3.9 3.9  102 99* CO2 28 31 32 BUN 59* 52* 46* CREA 3.59* 3.13* 3.04* CA 7.7* 7.8* 8.0*  
MG  --   --  2.2 AGAP 9 8 9 BUCR 16 17 15 CBC w/Diff Recent Labs 01/06/19 
0405 01/05/19 
0330 01/04/19 
6480 WBC 14.5* 10.4 14.7*  
RBC 3.69* 3.67* 4.16* HGB 10.2* 10.2* 11.7* HCT 32.8* 32.5* 36.4  160 186 GRANS 87* 86* 83* LYMPH 9* 7* 6*  
EOS 0 0 0 Cardiac Enzymes No results found for: CPK, CK, CKMMB, CKMB, RCK3, CKMBT, CKNDX, CKND1, CHRISTO, TROPT, TROIQ, GONZÁLEZ, TROPT, TNIPOC, BNP, BNPP Coagulation Recent Labs 01/06/19 
0405 01/05/19 
2043  01/05/19 
0330 PTP 18.2*  --   --  15.1 INR 1.5*  --   --  1.2 APTT 96.0* 88.5*   < > 110.5*  
 < > = values in this interval not displayed. Liver Enzymes No results for input(s): TP, ALB, TBIL, AP, SGOT, GPT in the last 72 hours.  
 
No lab exists for component: DBIL

## 2019-01-06 NOTE — ROUTINE PROCESS
Bedside and Verbal shift change report given to Werner Nguyen RN (oncoming nurse) by Alison Dixon RN (offgoing nurse). Report included the following information SBAR, Kardex and MAR.

## 2019-01-07 PROBLEM — I47.29 NSVT (NONSUSTAINED VENTRICULAR TACHYCARDIA): Status: ACTIVE | Noted: 2019-01-01

## 2019-01-07 NOTE — PROGRESS NOTES
Problem: Mobility Impaired (Adult and Pediatric) Goal: *Acute Goals and Plan of Care (Insert Text) Physical Therapy Goals Initiated 1/7/2019 and to be accomplished within 7 days. 1.  Patient will complete all bed mobility with minimal assistance/contact guard assist in order to prepare for EOB/OOB activity. 2.  Patient will perform sit <> stand with moderate assistance  in order to prepare for OOB/gait activity. 3.  Patient will perform bed to chair transfers with moderate assistance  in order to promote mobility and encourage seated activity to progress towards their prior level of function. 4.  Patient will ambulate 15 feet with minimal assistance/contact guard assist using LRAD in order to prepare for safe negotiation of their environment. Outcome: Progressing Towards Goal 
PHYSICAL THERAPY: Initial Assessment INPATIENT: [employer]: Hospital Day: 6 Patient: Muriel Echavarria (56 y.o. male)    Date: 1/7/2019 Primary Diagnosis: HCAP (healthcare-associated pneumonia) Acute exacerbation of CHF (congestive heart failure) ([facility])  
,    
Precautions: (None) PLOF: Independent ASSESSMENT : 
Patient supine in bed, agreeable to participation with PT. DTR present. Patient on RA upon entry. SPO2 85%, instructed in deep breathing sats only increase to 87%. O2 returned at 2L; SPO2 92%. Attempt for supine > sit x 3 trials, MAX A for supine > sit. Unable to achieve sitting EOB d/t c/o L hip pain. Further activity deferred. Dr. Santiago Vasquez present during examination, reporting R LE finding with CT; gait deferred. Patient left supine in bed with all needs with Patient may require SNF d/t poor pain tolerance pending progression with PT. May be appropriate for home health pending ambulation. Patient presents with deficits in: 
Bed Mobility, Transfers, Gait and Strength Patient will benefit from skilled intervention to address the above impairments. Patients rehabilitation potential is considered to be Good Factors which may influence rehabilitation potential include:  
[]         None noted 
[]         Mental ability/status [x]         Medical condition 
[]         Home/family situation and support systems 
[]         Safety awareness [x]         Pain tolerance/management 
[]         Other: PLAN : 
Recommendations and Planned Interventions: 
[x]           Bed Mobility Training             [x]    Neuromuscular Re-Education 
[x]           Transfer Training                   []    Orthotic/Prosthetic Training 
[x]           Gait Training                          []    Modalities [x]           Therapeutic Exercises          []    Edema Management/Control 
[x]           Therapeutic Activities            [x]    Patient and Family Training/Education 
[]           Other (comment): EDUCATION:  
Education:  Patient was educated on the following topics: Purpose of PT, PT POC, bed mobility, safety. Needs reinforcement. Barriers to Learning/Limitations: None Compensate with: visual, verbal, tactile, kinesthetic cues/model Recommendations for the next treatment session:  
Frequency/Duration: Patient will be followed by physical therapy 3 - 5 times a week to address goals. Discharge Recommendations: Joselo Shipman Further Equipment Recommendations for Discharge: TBD Factors which may impact discharge planning: N/A  
 
SUBJECTIVE:  
Patient stated I want to go home.  OBJECTIVE DATA SUMMARY:  
 
Past Medical History:  
Diagnosis Date  A-fib ([facility]  [facility]  BPH (benign prostatic hyperplasia)  Cardiomyopathy ([facility] LVEF 15% (12/18)  CKD (chronic kidney disease) stage 3, GFR 30-59 ml/min ([facility])  Crescentic glomerulonephritis   
 biopsy 12/31/2018  DM (diabetes mellitus) ([facility]  Hypercholesteremia  Hypertension  PAD (peripheral artery disease) ([facility]  PFO (patent foramen ovale)   
 closure of a patent foramen ovale by Dr. Brian Stafford 4/17/09.  Pulmonary fibrosis ([address]  S/P AVR (aortic valve replacement) 2009  
  23 mm Reggie-Diaz porcine valve Past Surgical History:  
Procedure Laterality Date  CARDIAC SURG PROCEDURE UNLIST  HX CATARACT REMOVAL    
 HX ORTHOPAEDIC    
 HX SHOULDER REPLACEMENT Eval Complexity: History: MEDIUM  Complexity : 1-2 comorbidities / personal factors will impact the outcome/ POC Exam:MEDIUM Complexity : 3 Standardized tests and measures addressing body structure, function, activity limitation and / or participation in recreation  Presentation: MEDIUM Complexity : Evolving with changing characteristics  Clinical Decision Making:Medium Complexity MAX A, increased pain Overall Complexity:MEDIUM 
 
G CODES:Mobility  Current  CL= 60-79%   Goal  CL= 60-79%. The severity rating is based on the Other MAX A for bed mobiltiy, increased pain, poor activity tolerance [address] Standing Balance Scale 
0: Pt performs 25% or less of standing activity (Max assist) CN, 100% impaired. 1: Pt supports self with upper extremities but requires therapist assistance. Pt performs 25-50% of effort (Mod assist) CM, 80% to <100% impaired. 1+: Pt supports self with upper extremities but requires therapist assistance. Pt performs >50% effort. (Min assist). CL, 60% to <80% impaired. 2: Pt supports self independently with both upper extremities (walker, crutches, parallel bars). CL, 60% to <80% impaired. 2+: Pt support self independently with 1 upper extremity (cane, crutch, 1 parallel bar). CK, 40% to <60% impaired. 3: Pt stands without upper extremity support for up to 30 seconds. CK, 40% to <60% impaired. 3+: Pt stands without upper extremity support for 30 seconds or greater. CJ, 20% to <40% impaired. 4: Pt independently moves and returns center of gravity 1-2 inches in one plane. CJ, 20% to <40% impaired. 4+: Pt independently moves and returns center of gravity 1-2 inches in multiple planes. CI, 1% to <20% impaired. 5: Pt independently moves and returns center of gravity in all planes greater than 2 inches. CH, 0% impaired. Prior Level of Function/Home Situation:  
Home Situation Home Environment: Private residence One/Two Story Residence: One story Living Alone: No 
Support Systems: Family member(s) Patient Expects to be Discharged to[de-identified] Private residence Current DME Used/Available at Home: NoneCritical Behavior: 
Neurologic State: Alert Orientation Level: Oriented X4 Cognition: Follows commands Safety/Judgement: Decreased insight into deficits Psychosocial 
Patient Behaviors: Anxious; Cooperative Purposeful Interaction: Yes Tone : NormalSensation: NT 
Range Of Motion: [facility] Functional Mobility: 
 
 
Functional Status Indep (I) Mod I Super-vision Min A Mod A Max A Total A Assist x2 Verbal cues Additional time Not tested Comments Rolling []  []  [] []    []    []  []  [] [] [] [x] Supine to sit []  []  [] []  []  [x]  []  [] [] [] [] Sit to supine []  []  [] []  []  []  []  [] [] [] [x] Sit to stand []  []  [] []  []  []  []  [] [] [] [x] Stand to sit []  []  [] []  []  []  []  [] [] [] [x] Bed to chair transfers []  []  [] []  []  []  []  [] [] [] [x] Balance Good Omelia Beady Poor Unable Not tested Comments Sitting static []  []  []  [x]  [] Sitting dynamic []  []  []  [x]  [] Standing static []  []  []  [x]  [] Standing dynamic []  []  []  [x]  [] Pain: 
L hip pain, no rating provided. Vital Signs Temp: 97.7 °F (36.5 °C) Pulse (Heart Rate): 68    
BP: 131/71 Resp Rate: 20    
O2 Sat (%): 91 % Activity Tolerance:  
Poor Please refer to the flowsheet for vital signs taken during this treatment. After treatment:  
[]         Patient left in no apparent distress sitting up in chair [x]         Patient left in no apparent distress in bed 
[x]         Call bell left within reach 
[]         Nursing notified 
[]         Caregiver present 
[]         Bed alarm activated COMMUNICATION/EDUCATION:  
[x]         Fall prevention education was provided and the patient/caregiver indicated understanding. [x]         Patient/family have participated as able in goal setting and plan of care. [x]         Patient/family agree to work toward stated goals and plan of care. []         Patient understands intent and goals of therapy, but is neutral about his/her participation. []         Patient is unable to participate in goal setting and plan of care. Thank you for this referral. 
Mandeep Sher Time Calculation: 25 mins

## 2019-01-07 NOTE — ROUTINE PROCESS
5994 Received report from Shireen Mejía. Patient alert and oriented. Patient complaining of back pain. 1047 Patient off the floor for Ct Scan. 1130 Patient back in the room. MRI checklist done. 1230 Patient currently resting in bed, alert and oriented to all spheres, verbalized lower back pain more comfortable, bed locked in low position. 1530 Pain med given. 1730 Patient off the floor to MRI. 1859 Patient back in the room. Meds given. 1920 Bedside and Verbal shift change report given to [address] (oncoming nurse) by me (offgoing nurse). Report included the following information SBAR, Kardex, Intake/Output, MAR, Recent Results and Cardiac Rhythm NSR with BBB.

## 2019-01-07 NOTE — PROGRESS NOTES
RENAL PROGRESS NOTE Quianaev Emersonmonalisa Assessment/Plan: · SETH on CKD 3. Current worsening of renal function is likely due to decompensated HFrEF and cardiorenal syndrome. Underling seth/ckd are due to biopsy proven anca associated crescentic GN. Scr is up slightly as of yesterday, obtain renal panel today. Some increase in scr may be due to bactrim. · Anca associated crescentic GN/microscopic polyangiitis. Continue prednisone. May consider 3rd infusion of rituximab tomorrow (pneumonia is being treated). · Decompensated HFrEF/volume overload. Improving, continue oral lasix. · Nosocomial pneumonia, on abx. 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 Will need repeat ct. · Underlying pulm fibrosis. · HTN, controlled. Continue current regimen. · LV thrombus, on ac with coumadin- INR is therapeutic. · Lt flank pain, le pain in a patient with recent lt kidney biopsy. Will obtain stat abd/pelvic ct without contrast.  
 
                                                                                                                            
Subjective:Patient complaints off: Feels better in terms of breathing but c/o worsening lt low back, flank pain, can't move lt leg due to pain. No CP/N/V. Patient Active Problem List  
Diagnosis Code  Acute exacerbation of CHF (congestive heart failure) ([facility]) I50.9  HCAP (healthcare-associated pneumonia) J18.9  LV (left ventricular) mural thrombus I51.3  Cardiomyopathy ([facility] [address] I42.9  Acute renal failure superimposed on stage 3 chronic kidney disease ([facility]) N17.9, N18.3  Aortic stenosis I35.0  Hypertension I10  
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  
 amiodarone (CORDARONE) tablet 400 mg  400 mg Oral DAILY Eileen Nava MD   400 mg at 01/07/19 0850  
 vancomycin (VANCOCIN) 750 mg in 0.9% sodium chloride (MBP/ADV) 250 mL ADV  750 mg IntraVENous Q24H Laura Arellano  mL/hr at 01/06/19 2124 750 mg at 01/06/19 2124  levoFLOXacin (LEVAQUIN) 500 mg in D5W IVPB  500 mg IntraVENous Q48H Laura Arellano  mL/hr at 01/07/19 0215 500 mg at 01/07/19 0215  piperacillin-tazobactam (ZOSYN) 3.375 g in 0.9% sodium chloride (MBP/ADV) 100 mL MBP  #EXTENDED 4-HOUR INFUSION##  3.375 g IntraVENous Q12H Laura Arellano MD 25 mL/hr at 01/07/19 0450 3.375 g at 01/07/19 0450  WARFARIN INFORMATION NOTE (COUMADIN)   Other PRN Laura Arellano MD      
 trimethoprim-sulfamethoxazole (BACTRIM, SEPTRA)  mg per tablet 1 Tab  1 Tab Oral Q MON, WED & Max Brady MD   1 Tab at 01/04/19 1639  
 metoprolol tartrate (LOPRESSOR) tablet 25 mg  25 mg Oral Q12H Eileen Nava MD   25 mg at 01/07/19 9528  sodium chloride (NS) flush 5-10 mL  5-10 mL IntraVENous PRN Felton Zavala MD      
 VANCOMYCIN INFORMATION NOTE   Other Rx Dosing/Monitoring Felton Zavala MD      
 atorvastatin (LIPITOR) tablet 40 mg  40 mg Oral DAILY Kendell Lovell MD   40 mg at 01/07/19 0850  
 docusate sodium (COLACE) capsule 100 mg  100 mg Oral BID Kendell Lovell MD   100 mg at 01/07/19 0850  ferrous sulfate tablet 325 mg  325 mg Oral ACB Kendell Lovell MD   325 mg at 01/07/19 0850  multivitamin, tx-iron-ca-min (THERA-M w/ IRON) tablet 1 Tab  1 Tab Oral DAILY Kendell Lovell MD   1 Tab at 01/07/19 7304  pantoprazole (PROTONIX) tablet 20 mg  20 mg Oral DAILY Kendell Lovell MD   20 mg at 01/07/19 0848  tamsulosin (FLOMAX) capsule 0.4 mg  0.4 mg Oral DAILY Kendell Lovell MD   0.4 mg at 01/07/19 3893  acetaminophen (TYLENOL) tablet 650 mg  650 mg Oral Q4H PRN Kendell Lovell MD   650 mg at 01/07/19 0450  ondansetron (ZOFRAN) injection 4 mg  4 mg IntraVENous Q4H PRN Kendell Lovell MD      
 albuterol-ipratropium (DUO-NEB) 2.5 MG-0.5 MG/3 ML  3 mL Nebulization Q4H PRN Nicolas Bruce NP   3 mL at 01/03/19 1146  warfarin (COUMADIN) tablet 5 mg  5 mg Oral QPM Nadine Lebron MD   5 mg at 01/06/19 1715 Objective Vitals:  
 01/06/19 2112 01/07/19 0108 01/07/19 0557 01/07/19 8183 BP: 150/81 122/54 121/42 134/62 Pulse: 90 62 91 78 Resp: 20 19 19 18 Temp: 97.8 °F (36.6 °C) 97.8 °F (36.6 °C) 97.6 °F (36.4 °C) 98.2 °F (36.8 °C) SpO2: 96% 97% 93% 96% Weight:      
Height:      
 
 
 
Intake/Output Summary (Last 24 hours) at 1/7/2019 1009 Last data filed at 1/7/2019 0480 Gross per 24 hour Intake 420 ml Output 350 ml Net 70 ml Admission weight: Weight: 91.2 kg (201 lb) (01/02/19 2318) Last Weight Metrics: 
Weight Loss Metrics 1/5/2019 9/9/2018 Today's Wt 198 lb 208 lb BMI 31.96 kg/m2 33.57 kg/m2 Physical Assessment:  
 
General: NAD, alert and oriented. Neck: No jvd. LUNGS: Clear to Auscultation, No rales, rhonchi or wheezes. CVS EXM: S1, S2  RRR, no murmurs/gallops/rubs. Abdomen: soft, non tender. Mild tenderness to palpation of the lt flank. Lower Extremities:  trace edema. Lab CBC w/Diff Recent Labs 01/06/19 
0405 01/05/19 
0330 WBC 14.5* 10.4 RBC 3.69* 3.67* HGB 10.2* 10.2* HCT 32.8* 32.5*  
 160 GRANS 87* 86* LYMPH 9* 7* EOS 0 0 Chemistry Recent Labs 01/06/19 
0405 01/05/19 
0330 * 115*  141  
K 4.0 3.9  102 CO2 28 31 BUN 59* 52* CREA 3.59* 3.13* CA 7.7* 7.8* AGAP 9 8 BUCR 16 17 No results found for: IRON, FE, TIBC, IBCT, PSAT, FERR Lab Results Component Value Date/Time Calcium 7.7 (L) 01/06/2019 04:05 AM  
  
 
Sandhya Machado M.D. [facility] Phone [phone] Pager [phone]

## 2019-01-07 NOTE — PROGRESS NOTES
[facility] - Progress Note Admit Date: 1/2/2019 I saw, evaluated, interviewed and examined the patient personally. I agree with the findings and plan of care as documented below with PA-C note No cardiac complaints. Breathing is stable. No angina C/O Back and left hip pain. No decompensated heart failure. Has refused LifeVest and want medicine. Amiodarone IV infusion for 18 hours finished now on PO amiodarone. Continue BB, imdur, statin. DW. Coumadin for Possible LV thrombus for 3-6 months and after ASA. Primary team managing coumadin dosing. No further cardiac work up planned at this time unless clinical status changes. Call us back if needed. Will be available as needed. Will need to follow up in cardiology clinic in 2-3 weeks after discharge. Thank you. Rance Sicard, MD 
 
 
 
Assessment:  
 
- Cardiology consult due to acute on chronic CHF 
- Echo 01/03/19 with EF <15%, LV global hypokinesis, small calcified thrombus in LV (fixed and located in apex). Porcine bioprosthetic aortic valve. - Echo 12/22/18 with EF 15% - Hx of cardiomyopathy with echocardiogram 2/13/09 demonstrating an EF of 35% with  
improvement of ejection fraction to 45% by echocardiography post aortic valve  
replacement about March 2010.  
- Cardiac catheterization 4/2/09 demonstrating severe aortic stenosis with mild coronary disease, 50% stenosis of the posterolateral OM  
- CKD III 
- Recent hospital admission d/c 01/01/19 with poss TIA with transient vision loss, seen by neuro and vascular surgery, deemed likely ischemic 
- CT chest done 12/24/18 with multiple pulmonary and pleural based nodules suspicious for neoplasm.  It was recommended to pursue PET scan at d/c 
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 
- Hx of Severe aortic stenosis s/p aortic valve replacement with a 23 mm Reggie-Diaz porcine valve and closure of a patent foramen ovale 4/17/09.  
- Hypertension  
- Diabetes Mellitus - Remote hx tobacco use 
- NSVT on tele monitor. 8-12 beats,. Plan: - INR therapeutic today at 2.3. Amiodarone infusion completed for 18 hours. Amiodarone 400 mg daily has been started. Continue with Lopressor, Imdur and statin. Will need close outpatient clinic follow up, contact info given. - Please see note for full discussion and detail on LifeVest, patient again adamant this AM that he does not want to wear it. Risks of arrhythmia and death were discussed and he understood. - Continue with PO Lasix at discharge Subjective:  
 
Has some back pain this AM. Awaiting PT. Objective:  
  
Patient Vitals for the past 8 hrs: 
 Temp Pulse Resp BP SpO2  
01/07/19 0759 98.2 °F (36.8 °C) 78 18 134/62 96 % 01/07/19 0557 97.6 °F (36.4 °C) 91 19 121/42 93 % Patient Vitals for the past 96 hrs: 
 Weight 01/05/19 1836 198 lb (89.8 kg) Intake/Output Summary (Last 24 hours) at 1/7/2019 1107 Last data filed at 1/7/2019 6547 Gross per 24 hour Intake 500 ml Output 350 ml Net 150 ml Physical Exam: 
General:  alert, cooperative, no distress Neck:  nontender, no JVD Lungs:  clear to auscultation bilaterally Heart:  regular rate and rhythm, S1, S2 normal, no murmur, click, rub or gallop Abdomen:  abdomen is soft without significant tenderness, masses, organomegaly or guarding Extremities:  extremities normal, atraumatic, no cyanosis or edema Data Review:  
 
Labs: Results:  
   
Chemistry Recent Labs 01/06/19 
0405 01/05/19 
0330 * 115*  141  
K 4.0 3.9  102 CO2 28 31 BUN 59* 52* CREA 3.59* 3.13* CA 7.7* 7.8* AGAP 9 8 BUCR 16 17 CBC w/Diff Recent Labs 01/07/19 
0601 01/06/19 
0405 01/05/19 
0330 WBC 12.6 14.5* 10.4 RBC 3.45* 3.69* 3.67* HGB 9.6* 10.2* 10.2* HCT 31.3* 32.8* 32.5*  
  177 160 GRANS  --  87* 86* LYMPH  --  9* 7* EOS  --  0 0 Cardiac Enzymes No results found for: CPK, CK, CKMMB, CKMB, RCK3, CKMBT, CKNDX, CKND1, CHRISTO, TROPT, TROIQ, GONZÁLEZ, TROPT, TNIPOC, BNP, BNPP Coagulation Recent Labs 01/07/19 
0601 01/06/19 
0405 PTP 25.0* 18.2* INR 2.3* 1.5* APTT >180.0* 96.0* Lipid Panel No results found for: CHOL, CHOLPOCT, CHOLX, CHLST, CHOLV, 178351, HDL, LDL, LDLC, DLDLP, 683347, VLDLC, VLDL, TGLX, TRIGL, TRIGP, TGLPOCT, CHHD, CHHDX  
BNP No results found for: BNP, BNPP, XBNPT Liver Enzymes No results for input(s): TP, ALB, TBIL, AP, SGOT, GPT in the last 72 hours. No lab exists for component: DBIL Digoxin Thyroid Studies No results found for: T4, T3U, TSH, TSHEXT Signed By: Lelo Gonzalez. Izzy Berger PA-C January 7, 2019

## 2019-01-07 NOTE — DISCHARGE INSTRUCTIONS
DISCHARGE SUMMARY from Nurse    PATIENT INSTRUCTIONS:    After general anesthesia or intravenous sedation, for 24 hours or while taking prescription Narcotics:  · Limit your activities  · Do not drive and operate hazardous machinery  · Do not make important personal or business decisions  · Do  not drink alcoholic beverages  · If you have not urinated within 8 hours after discharge, please contact your surgeon on call. Report the following to your surgeon:  · Excessive pain, swelling, redness or odor of or around the surgical area  · Temperature over 100.5  · Nausea and vomiting lasting longer than 4 hours or if unable to take medications  · Any signs of decreased circulation or nerve impairment to extremity: change in color, persistent  numbness, tingling, coldness or increase pain  · Any questions    What to do at Home:  Recommended activity: Activity as tolerated,     If you experience any of the following symptoms shortness of breath, chest pain longer than 5 minutes, please follow up with 911. *  Please give a list of your current medications to your Primary Care Provider. *  Please update this list whenever your medications are discontinued, doses are      changed, or new medications (including over-the-counter products) are added. *  Please carry medication information at all times in case of emergency situations. These are general instructions for a healthy lifestyle:    No smoking/ No tobacco products/ Avoid exposure to second hand smoke  Surgeon General's Warning:  Quitting smoking now greatly reduces serious risk to your health.     Obesity, smoking, and sedentary lifestyle greatly increases your risk for illness    A healthy diet, regular physical exercise & weight monitoring are important for maintaining a healthy lifestyle    You may be retaining fluid if you have a history of heart failure or if you experience any of the following symptoms:  Weight gain of 3 pounds or more overnight or 5 pounds in a week, increased swelling in our hands or feet or shortness of breath while lying flat in bed. Please call your doctor as soon as you notice any of these symptoms; do not wait until your next office visit. Recognize signs and symptoms of STROKE:    F-face looks uneven    A-arms unable to move or move unevenly    S-speech slurred or non-existent    T-time-call 911 as soon as signs and symptoms begin-DO NOT go       Back to bed or wait to see if you get better-TIME IS BRAIN. Warning Signs of HEART ATTACK     Call 911 if you have these symptoms:   Chest discomfort. Most heart attacks involve discomfort in the center of the chest that lasts more than a few minutes, or that goes away and comes back. It can feel like uncomfortable pressure, squeezing, fullness, or pain.  Discomfort in other areas of the upper body. Symptoms can include pain or discomfort in one or both arms, the back, neck, jaw, or stomach.  Shortness of breath with or without chest discomfort.  Other signs may include breaking out in a cold sweat, nausea, or lightheadedness. Don't wait more than five minutes to call 911 - MINUTES MATTER! Fast action can save your life. Calling 911 is almost always the fastest way to get lifesaving treatment. Emergency Medical Services staff can begin treatment when they arrive -- up to an hour sooner than if someone gets to the hospital by car. The discharge information has been reviewed with the patient. The patient verbalized understanding. Discharge medications reviewed with the patient and appropriate educational materials and side effects teaching were provided.   ___________________________________________________________________________________________________________________________________

## 2019-01-07 NOTE — PROGRESS NOTES
[address] [place], [place] [address] Hospitalist Division Inpatient Daily Progress Note Daily progress Note Patient: Bayron Retana MRN: [id number]  [facility]: [id number] YOB: 1932  Age: 80 y.o. 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 Oxygen sats 88% on RA and was placed on oxygen with some relief. Pt was recently discharged from [facility] on 1/12019 for transient vision loss- seen by neuro and vascular surgery deemed likely ischemic, also had CT guided renal biopsy on 12/31 which confirmed acute crescentic GN. Pt has been on high dose oral Prednisone and so far has received two doses of Rituximab (last dose on 1/12019). CT chest note pulmonary lesions which are felt to be due to vasculitis vs mets. Work up in the ED - CXR findings suspicious for pulmonary edema superimposed on chronic interstitial lung disease, suspected additional bibasilar superimposed alveolar component although pneumonia is difficult to exclude, probable small pleural effusions. Troponin 0.09 - -> 0.11 - -> 0.11, creatinine 2.48 and BNP 81549 in ED. Pt started on IV diuretics this admission and admitted for further management. Echo obtained 1/3 showed EF < 15%, LV hypertrophy, LV global hypokinesis, small echogenic and calcified thrombus LV, porcine bioprosthetic aortic valve, trace mitral valve regurgitation. Cardiology consulted. Pt started on Heparin gtt and oral Coumadin.   Creatinine increased to 3 on 1/4, nephrology consulted given recent biopsy findings and increasing creatinine given diuresis. Nephrology recommends continue oral prednisone, pt's next dose of Rituximab was supposed to be on 1/8 but will hold until pneumonia is treated. Pt started on Bactrim for PCP prophylaxis. Ok to resume Lasix per nephrology. Lasix restarted. Patient was ok to go home 1/5 but required 02 and delayed dc in addition life vest was ordered but patient refused. Heparin gtt dc'd on 1/7 as INR 2.3- as recommended per cardiology notes. Acute weakness this am, pt unable to sit up in bed and PT unable to work with pt- he was ambulatory over weekend. Pt c/o left lateral thigh pain and numbness inner right thigh. CT abd/pelvis w/o contrast showed lobulated intramuscular hypodense lesion involving RLE muscles greatest in pectineus muscle, right pleural effusion w/ multiple pleural based nodules, abnormal linear hypodensity within anteroinferior aspect of L4 vertebral body suggesting compression fracture w/ possible localized osteonecrotic cavity. Findings discussed with pt and daughter- plan for MRI L spine and bilateral upper thighs- pending. Pt has agreed to SNF- CM following and matching to facilities. Pt will not be discharged. Assessment/Plan:  
 
Patient Active Problem List  
Diagnosis Code  Acute exacerbation of CHF (congestive heart failure) ([facility]) I50.9  HCAP (healthcare-associated pneumonia) J18.9  LV (left ventricular) mural thrombus I51.3  Cardiomyopathy ([facility] [address] I42.9  Acute renal failure superimposed on stage 3 chronic kidney disease ([facility]) N17.9, N18.3  Aortic stenosis I35.0  Hypertension I10  
 ANCA-associated vasculitis ([facility]) I77.6 A/P: 
Acute Exacerbation of CHF /Cardiomyopathy 
- cardiology following- BNP 50199 on admission - Lasix daily (oral), monitor I/O 
- Echo 1/3 with EF < 15%, LV hypertrophy, LV global hypokinesis, small echogenic and calcified thrombus LV, porcine bioprosthetic aortic valve, trace mitral valve regurgitation 
- resume home meds - Patient refuses life vest 
 
Acute weakness - pt c/o pain left lateral thigh and numbness right inner thigh- denies bowel/bladder incontinence, falls - CT abd/pelvis questionable hematoma pectineus muscle? - MRI L spine and b/l fem pending HCAP 
- continue IV Abx 
- started on Bactrim per nephrology for PCP prophylaxis 
- supplemental oxygen, duo-nebs, prednisone daily LV Thrombus 
- noted on Echo this admission 
- Heparin gtt- dc'd 1/7- INR of 2.3 
- Coumadin for 3-6 months for possible LV thrombus which was noted to be calcified NSVT 
- cardiology following 
- was on Amiodarone IV, now on 400 mg oral 
 
Hx of aortic stenosis and aortic valve replacement 
- started on Coumadin 1/3 
- monitor INR daily - cardiology following Acute on chronic renal failure superimposed on CKD 3 
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 
- continue Prednisone 60 mg daily - pt received two doses of of Rituximab (last dose on 1/12019), next dose was supposed to be on 1/8 but will hold until pneumonia is treated 
- continue IV diuretics per nephrology 
- monitor I/O's, BMP Pulmonary Nodules 
-CT chest done 12/24/18 with multiple pulmonary and pleural based nodules suspicious for neoplasm 
- was recommended to pursue PET scan at d/c Hypertension 
- continue home meds DVT Prophylaxis - pt on Coumadin Asa Powell, MARKP-C [id number] Pager:  [phone] Office:  [phone] Subjective:  
  
Daughter at bedside and updated. Pt unable to walk or sit up in bed today- was ambulatory over weekend. C/o left lateral pain and right inner thigh numbness- denies fall, bowel/bladder incontinence. No c/o SOB or chest pain. Objective:  
  
Visit Vitals /62 Pulse 78 Temp 98.2 °F (36.8 °C) Resp 18 Ht 5' 6\" (1.676 m) Wt 89.8 kg (198 lb) SpO2 96% BMI 31.96 kg/m² Physical Exam: 
General appearance: alert, cooperative, no distress, appears stated age Head: Normocephalic, without obvious abnormality, atraumatic Lungs: clear to auscultation bilaterally Heart: regular rate and rhythm, S1, S2 normal, no murmur, click, rub or gallop Abdomen: soft, non tender, non distended. Normoactive bowel sounds. Extremities: extremities normal, atraumatic, no cyanosis or edema Skin: scattered bruising BUE Neurologic: generalized weakness- MORALES 
PSY: Mood and affect normal, appropriately behaved Intake and Output: 
Current Shift:  01/07 0701 - 01/07 1900 In: -  
Out: 005 [GWHOF:607] Last three shifts:  01/05 1901 - 01/07 0700 In: 1119.2 [P.O.:360; I.V.:759.2] Out: 350 [Urine:350] Recent Results (from the past 24 hour(s)) PROTHROMBIN TIME + INR Collection Time: 01/07/19  6:01 AM  
Result Value Ref Range Prothrombin time 25.0 (H) 11.5 - 15.2 sec INR 2.3 (H) 0.8 - 1.2 [place] Collection Time: 01/07/19  6:01 AM  
Result Value Ref Range Vancomycin, random 25.5 5.0 - 40.0 UG/ML  
PTT Collection Time: 01/07/19  6:01 AM  
Result Value Ref Range aPTT >180.0 (HH) 23.0 - 36.4 SEC Lab Results Component Value Date/Time Glucose 124 (H) 01/06/2019 04:05 AM  
 Glucose 115 (H) 01/05/2019 03:30 AM  
 Glucose 91 01/04/2019 06:44 AM  
 Glucose 86 01/03/2019 08:05 AM  
 Glucose 135 (H) 01/02/2019 11:30 PM  
  
 
Imaging: No results found. Medication List Reviewed: 
Current Facility-Administered Medications Medication Dose Route Frequency  traMADol (ULTRAM) tablet 50 mg  50 mg Oral Q6H PRN  predniSONE (DELTASONE) tablet 40 mg  40 mg Oral DAILY WITH BREAKFAST  furosemide (LASIX) tablet 40 mg  40 mg Oral DAILY  isosorbide dinitrate (ISORDIL) tablet 5 mg  5 mg Oral BID  
 amiodarone (CORDARONE) tablet 400 mg  400 mg Oral DAILY  vancomycin (VANCOCIN) 750 mg in 0.9% sodium chloride (MBP/ADV) 250 mL ADV  750 mg IntraVENous Q24H  
 levoFLOXacin (LEVAQUIN) 500 mg in D5W IVPB  500 mg IntraVENous Q48H  piperacillin-tazobactam (ZOSYN) 3.375 g in 0.9% sodium chloride (MBP/ADV) 100 mL MBP  #EXTENDED 4-HOUR INFUSION##  3.375 g IntraVENous Q12H  WARFARIN INFORMATION NOTE (COUMADIN)   Other PRN  
 trimethoprim-sulfamethoxazole (BACTRIM, SEPTRA)  mg per tablet 1 Tab  1 Tab Oral Q MON, WED & FRI  metoprolol tartrate (LOPRESSOR) tablet 25 mg  25 mg Oral Q12H  
 sodium chloride (NS) flush 5-10 mL  5-10 mL IntraVENous PRN  
 VANCOMYCIN INFORMATION NOTE   Other Rx Dosing/Monitoring  atorvastatin (LIPITOR) tablet 40 mg  40 mg Oral DAILY  docusate sodium (COLACE) capsule 100 mg  100 mg Oral BID  ferrous sulfate tablet 325 mg  325 mg Oral ACB  multivitamin, tx-iron-ca-min (THERA-M w/ IRON) tablet 1 Tab  1 Tab Oral DAILY  pantoprazole (PROTONIX) tablet 20 mg  20 mg Oral DAILY  tamsulosin (FLOMAX) capsule 0.4 mg  0.4 mg Oral DAILY  acetaminophen (TYLENOL) tablet 650 mg  650 mg Oral Q4H PRN  
 ondansetron (ZOFRAN) injection 4 mg  4 mg IntraVENous Q4H PRN  
 albuterol-ipratropium (DUO-NEB) 2.5 MG-0.5 MG/3 ML  3 mL Nebulization Q4H PRN  
 warfarin (COUMADIN) tablet 5 mg  5 mg Oral QPM

## 2019-01-07 NOTE — PROGRESS NOTES
I was called with the result of stat abd/pelvis ct without contrast by Dr. Reyes Cross- no lt  perinephric/rp hematoma (site of recent kidney biopsy). There is, however abnormal signal in the rt proximal thigh. Hematoma? Patient in the meanwhile c/o lt low back/hip pain. When asked about rt thigh he reports numbness which started this am. Rt anterior/medial thigh is with minimal tenderness to palplation. Will further w/u/mgmt with Dr. Caryle Bellow.

## 2019-01-07 NOTE — PROGRESS NOTES
conducted a Follow up consultation and Spiritual Assessment for Denton Thomas, who is a 80 y.o.,male. The  provided the following Interventions: 
Continued the relationship of care and support. Patient in good spirits. Listened empathically to patient. He said he experienced a lot of issues all at the same time, but now he is improving and may be here another week. He also received Holy Communion from the lay /volunteer this morning. Offered assurance of prayer on patient's behalf. Chart reviewed. The following outcomes were achieved: 
Patient expressed gratitude for pastoral care visit. Assessment: 
There are no further spiritual or [facility] issues which require Spiritual Care Services interventions at this time. Plan: 
Chaplains will continue to follow and provide pastoral care as needed or requested.  recommends bedside caregivers page  on duty if patient shows signs of acute spiritual or emotional distress. The Rev. Bina Gonzales [address] Spiritual Care Services [address] [facility],4Th Floor [facility] [phone] / [facility] [phone]

## 2019-01-07 NOTE — ROUTINE PROCESS
0720 Bedside, Verbal and Written shift change report given to [address] [place] [address],[address] (oncoming nurse) by Tony Foss RN (offgoing nurse). Report included the following information SBAR and Kardex. 1930 Bedside, Verbal and Written shift change report given to Jennifer Nix (oncoming nurse) by Vinh RN (offgoing nurse). Report included the following information SBAR and Kardex. Patient currently resting in bed, alert and oriented to all spheres, denies pain or shortness of breath, call bell in reach, 5 Ps and hourly rounding completed, bed locked in low position

## 2019-01-07 NOTE — PROGRESS NOTES
Pharmacy Dosing Services: Vancomycin Indication: HAP Day of therapy: 5 Other Antimicrobials (Include dose, start day & day of therapy): Levofloxacin 750 mg IV then 500 mg IV every 48 hours (started 1/3) Piperacillin/tazobactam 3.375 gm IV every 12 hours extended infusion (started 1/3) Bactrim DS PO MWF Loading dose (date given): 1750 mg 
Current Maintenance dose: 750 mg IV q12h Goal Vancomycin Level: 15-20 
(Trough 15-20 for most infections, 20 for meningitis/osteomyelitis, pre-HD level ~25) Vancomycin Level (if drawn):  
 - 12 (Random)  - 21.2 (18.2 hours post dose)  - 25.5 (Random) Significant Cultures:  
 - blood culture - NGTD 
1/3 - urine culture - NGTD Renal function stable? (unstable defined as SCr increase of 0.5 mg/dL or > 50% increase from baseline, whichever is greater) (Y/N): N  
 
CAPD, Hemodialysis or Renal Replacement Therapy (Y/N): N Recent Labs 19 
0601 19 
0405 19 
0330 CREA 3.56* 3.59* 3.13* BUN 57* 59* 52* WBC 12.6 14.5* 10.4 Temp (24hrs), Av.9 °F (36.6 °C), Min:97.6 °F (36.4 °C), Max:98.5 °F (36.9 °C) Creatinine Clearance (Creatinine Clearance (ml/min)): < 20 ml/min Regimen assessment: - Scr continues to increase, will dose per level Maintenance dose: Dosing per level Next scheduled level:  at  Pharmacy will follow daily and adjust medications as appropriate for renal function and/or serum levels.  
 
Thank you, 
Abigail Mulligan, PHARMD

## 2019-01-07 NOTE — PROGRESS NOTES
1/6/19 19:30- Bedside report received from [place], [place]. Heparin and amiodarone drips verified (see MAR). 1/6/19 20:00- Assessment completed- see charting. Patient sitting in a chair at the bedside. 21:22- Med with prn tylenol for c/o left leg  And hip pain (mostly noted when patient stands up) with good effect. Patient c/o the bed being very uncomfortable and that he will stay in the chair instead of the bed. 22:30- Another bed was brought in the room in which he could sleep- assisted into the bed. Patient reported the new bed is better!   
00:10- Resting quietly with eyes closed. 02:00- ICU nurseCassi inserted 22g left forearm IV. Wall alarm on for safety. Amiodarone stopped as ordered. 04:30- Patient noted to be awake and talking to his wife on the telephone. Wall Alarm on for safety. Call light in reach. Continue to monitor. 05:00- Med with tylenol 650mg po for c/o back and left hip pain with fair effect. 07:30- Report given to [facility]. Order for PT/OT consult.

## 2019-01-07 NOTE — PROGRESS NOTES
Chart reviewed. Met with pt & dtr to discuss disposition. Dtr states pt cannot go home if he can't walk. Asked them about rehab prior to returning home, agreeable if he does not improve, would prefer to go home, states ok to send out to [facility]. Posted in e-discharge & referral called to Amna in [facility] admissions. SNF list provided. Will cont to follow. Carmen Cutler RN,ext [phone].

## 2019-01-07 NOTE — PROGRESS NOTES
Pharmacy Monitoring Warfarin Indication:   Atrial Fibrillation INR Goal:  2 - 3 (unless specified) DDIs: 
Drugs that may increase INR: Quinolones and SMX/ TMP Drugs that may decrease INR: None Other current anticoagulants/ drugs that may increase bleeding risk: None Recent Labs 01/07/19 
0601 01/06/19 
0405 01/05/19 
0330 HGB 9.6* 10.2* 10.2* INR 2.3* 1.5* 1.2 Daily PT/INR order in place?: Yes 
 
Daily dose ordered: 5 mg PO daily Recommendation: Note that levofloxacin and Bactrim increase INR. Adjust dose as appropriate - may consider lowering dose given significant increase in INR within 24 hours.  
 
Thanks, 
Elle Jamesster, PHARMD

## 2019-01-07 NOTE — PROGRESS NOTES
Chart reviewed. Notes sats done on 1/5/19 indicating need for home O2. Will need new updated walk test & orders for home O2. Plan home with [facility]. Will cont to follow for further needs. Carmen Cutler RN,ext [phone].

## 2019-01-08 NOTE — PROGRESS NOTES
OT order received, chart reviewed. 1117: PT working with pt. Will follow up as schedule permits. Erin Medina MS OTR/L Office Ext: [phone] Pager: [phone]

## 2019-01-08 NOTE — PROGRESS NOTES
Paged by Nurse about MRI results R leg Concern for compartment syndrome by MRI - shows large fluid collection R thigh - pt seen & examined - not c/o pain , R thigh does not appear hard or swollen - mild Lower extremity swelling, pulses are intact Will page Ortho on call tonight to inform about results Update - discussed with Ortho - Dr Christine Barreto on call - clinically this does not appear to be Compartment syndrome - pt is not in pain - mentions his pain is gone , leg is mildly swollen but then again pt is admitted for CHF exac - has a known EF <15% - refused vest , pulses are present Will advise nurse to check pulses q2hrly & page if any other changes are noted Will continue to follow pt

## 2019-01-08 NOTE — ROUTINE PROCESS
2010  Received call from Dr. Toni Larios from radiology about MRI results. Per Dr. Toni Larios, large fluid collection in the RLE about [id number], with considerable edema in thigh muscle. Recommendation to consult ortho. Dr. Leonila Jj paged with results. Pulses palpable, pt has no c/o pain, numbness, or tingling Will continue to monitor 2110  Dr. Leonila jJ on unit to assess patient 2140  Received call from Dr. Leonila Jj advising to check pulses q2h and call with concerns. 0030  Reassessment complete. No change in pt condition 0400  Reassessment complete. No change in pt condition 0715  Bedside and Verbal shift change report given to Manfred Guthrie (oncoming nurse) by Rubens Torres (offgoing nurse). Report included the following information SBAR, Kardex, Intake/Output and MAR.

## 2019-01-08 NOTE — PROGRESS NOTES
RENAL PROGRESS NOTE Tatum Heidi Assessment/Plan: · SETH on CKD 3. Current worsening of renal function is likely due to decompensated HFrEF and cardiorenal syndrome. Underling seth/ckd are due to biopsy proven anca associated crescentic GN. Scr is down slightly since yesterday, continue to monitor. 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 Continue prednisone. Will proceed with 3rd infusion of rituximab tomorrow (pneumonia is being treated). · Decompensated HFrEF/volume overload. Improving, continue oral lasix. · Nosocomial pneumonia, on abx. Improving. 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 Will need repeat ct. · Underlying pulm fibrosis. · HTN, controlled. Continue current regimen. · LV thrombus, on ac with coumadin- INR is therapeutic. Will defer decision about long term ac to card (especially given rt thigh hematoma). · NSTVT. On amiodarone. Declined live vest.   
· L4 compression fracture. Ortho recs noted. · Rt thigh hematoma (spontaneous in a setting of ac). No need for surgery per ortho. Subjective:Patient complaints off: Feels better in terms of breathing but continues to  c/o worsening lt low back, hip pain, can't move lt leg due to pain. Also c/o rt inner thigh pain on/off. No CP/N/V. Stahl placed. Patient Active Problem List  
Diagnosis Code  Acute exacerbation of CHF (congestive heart failure) ([facility]) I50.9  HCAP (healthcare-associated pneumonia) J18.9  LV (left ventricular) mural thrombus I51.3  Cardiomyopathy ([facility] [address] I42.9  Acute renal failure superimposed on stage 3 chronic kidney disease ([facility]) N17.9, N18.3  Aortic stenosis I35.0  Hypertension I10  
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) I47.2 Current Facility-Administered Medications Medication Dose Route Frequency Provider Last Rate Last Dose  traMADol (ULTRAM) tablet 50 mg  50 mg Oral Q6H PRN Aminata Rausch H, DO   50 mg at 01/08/19 0840  VANCOMYCIN INFORMATION NOTE   Other ONCE Aminata Shalom H, DO      
 predniSONE (DELTASONE) tablet 60 mg  60 mg Oral DAILY WITH BREAKFAST Awilda Perez MD   60 mg at 01/08/19 1350  furosemide (LASIX) tablet 40 mg  40 mg Oral DAILY Maurice Cantor MD   40 mg at 01/08/19 0841  
 isosorbide dinitrate (ISORDIL) tablet 5 mg  5 mg Oral BID Zee Sy MD   5 mg at 01/08/19 0840  
 amiodarone (CORDARONE) tablet 400 mg  400 mg Oral DAILY Zee Sy MD   400 mg at 01/08/19 8170  levoFLOXacin (LEVAQUIN) 500 mg in D5W IVPB  500 mg IntraVENous Q48H Salma Doshi  mL/hr at 01/07/19 0215 500 mg at 01/07/19 0215  piperacillin-tazobactam (ZOSYN) 3.375 g in 0.9% sodium chloride (MBP/ADV) 100 mL MBP  #EXTENDED 4-HOUR INFUSION##  3.375 g IntraVENous Q12H Salma Doshi MD 25 mL/hr at 01/08/19 0525 3.375 g at 01/08/19 2389  WARFARIN INFORMATION NOTE (COUMADIN)   Other PRN Salma Doshi MD      
 trimethoprim-sulfamethoxazole (BACTRIM, SEPTRA)  mg per tablet 1 Tab  1 Tab Oral Q MON, WED & Balwinder Freire MD   1 Tab at 01/07/19 1904  metoprolol tartrate (LOPRESSOR) tablet 25 mg  25 mg Oral Q12H Evonne BRAVO MD   25 mg at 01/08/19 0841  
 sodium chloride (NS) flush 5-10 mL  5-10 mL IntraVENous PRN Td Hall MD      
 VANCOMYCIN INFORMATION NOTE   Other Rx Dosing/Monitoring Td Hall MD      
 atorvastatin (LIPITOR) tablet 40 mg  40 mg Oral DAILY Kendell Lovell MD   40 mg at 01/08/19 0840  
 docusate sodium (COLACE) capsule 100 mg  100 mg Oral BID Kendell Lovell MD   100 mg at 01/08/19 0843  ferrous sulfate tablet 325 mg  325 mg Oral ACB Kendell Lovell MD   325 mg at 01/08/19 7832  multivitamin, tx-iron-ca-min (THERA-M w/ IRON) tablet 1 Tab  1 Tab Oral DAILY Kendell Lovell MD   1 Tab at 01/08/19 0840  
 pantoprazole (PROTONIX) tablet 20 mg  20 mg Oral DAILY Kendell Lovell MD   20 mg at 01/08/19 0208  tamsulosin (FLOMAX) capsule 0.4 mg  0.4 mg Oral DAILY Kendell Lovell MD   0.4 mg at 01/08/19 0840  acetaminophen (TYLENOL) tablet 650 mg  650 mg Oral Q4H PRN Kendell Lovell MD   650 mg at 01/07/19 0450  ondansetron (ZOFRAN) injection 4 mg  4 mg IntraVENous Q4H PRN Kendell Lovell MD      
 albuterol-ipratropium (DUO-NEB) 2.5 MG-0.5 MG/3 ML  3 mL Nebulization Q4H PRN Sintia Reina NP   3 mL at 01/03/19 1146  warfarin (COUMADIN) tablet 5 mg  5 mg Oral QPM Laura Arellano MD   5 mg at 01/07/19 9673 Objective Vitals:  
 01/08/19 0741 01/08/19 1124 01/08/19 1146 01/08/19 1532 BP: 119/73 128/64  108/85 Pulse: 68 69  66 Resp: 18 20  20 Temp: 97.8 °F (36.6 °C) 97.9 °F (36.6 °C)  98.2 °F (36.8 °C) SpO2: 98% 98%  99% Weight:   91.6 kg (202 lb) Height:      
 
 
 
Intake/Output Summary (Last 24 hours) at 1/8/2019 1533 Last data filed at 1/8/2019 1230 Gross per 24 hour Intake 480 ml Output 525 ml Net -45 ml Admission weight: Weight: 91.2 kg (201 lb) (01/02/19 2318) Last Weight Metrics: 
Weight Loss Metrics 1/8/2019 9/9/2018 Today's Wt 202 lb 208 lb BMI 32.6 kg/m2 33.57 kg/m2 Physical Assessment:  
 
General: NAD, alert and oriented. Neck: No jvd. LUNGS: Clear to Auscultation, No rales, rhonchi or wheezes. CVS EXM: S1, S2  RRR, no murmurs/gallops/rubs. Abdomen: soft, non tender. Stahl. Mild tenderness to palpation of the lt flank, lt hip. Rt inner thigh with mild area of tenderness, no erythema, soft. Lower Extremities:  trace edema. Lab CBC w/Diff Recent Labs 01/08/19 
8565 01/07/19 
0601 01/06/19 
0405 WBC 13.1 12.6 14.5*  
RBC 2.86* 3.45* 3.69* HGB 7.9* 9.6* 10.2* HCT 25.7* 31.3* 32.8*  
  167 177 GRANS  --   --  87* LYMPH  --   --  9* EOS  --   --  0 Chemistry Recent Labs 01/08/19 
8265 01/07/19 
0601  01/06/19 
0405 * 98  --  124*  145  --  141  
K 4.1 4.0  --  4.0  
 106  --  104 CO2 29 30  --  28  
BUN 55* 57*  --  59* CREA 3.54* 3.56*  --  3.59* CA 7.8* 7.8*  --  7.7* AGAP 9 9  --  9  
BUCR 16 16  --  16 ALB 2.3* 2.6*  --   --   
PHOS 6.1* 6.1*   < >  --   
 < > = values in this interval not displayed. No results found for: IRON, FE, TIBC, IBCT, PSAT, FERR Lab Results Component Value Date/Time Calcium 7.8 (L) 01/08/2019 04:55 AM  
 Phosphorus 6.1 (H) 01/08/2019 04:55 AM  
  
 
Wilmer Price M.D. [facility] Phone [phone] Pager [phone]

## 2019-01-08 NOTE — PROGRESS NOTES
Pharmacy Monitoring Warfarin Indication:   Atrial Fibrillation INR Goal:  2 - 3 (unless specified) DDIs: 
Drugs that may increase INR: Quinolones and SMX/ TMP Drugs that may decrease INR: None Other current anticoagulants/ drugs that may increase bleeding risk: None Recent Labs 01/08/19 
9133 01/07/19 
0601 01/06/19 
0405 HGB 7.9* 9.6* 10.2* INR 2.6* 2.3* 1.5* Daily PT/INR order in place?: Yes 
 
Daily dose ordered: 5 mg PO daily Recommendation: Note that levofloxacin and Bactrim increase INR. Adjust dose as appropriate - may consider lowering dose given significant increase in INR within the past 48 hours.  
 
Thanks, 
Briana Young, PHARMD

## 2019-01-08 NOTE — PROGRESS NOTES
Referral for home 02 sent to [facility] via [address] and called to their intake rep, Bebe Vanegas. She will call [facility] pt to discuss self-payment for the home 02. Informed hre the pt will need a portable delivered to the hospital room for transport home. Offered the pt FOC for home care, [facility]. FOC form placed in the unit chart; copy given to the pt. Referral sent to intake via [employer] and called to intake nurse Yordy Michel. [facility] # [phone] rep, Bebe Vanegas, called the pt. He made payment for the home 02. A portable tank will be delivered to the hospital room for transport home today. Care Management Interventions PCP Verified by CM: (Dr. Fortino Lopez, last seen unknown) Palliative Care Criteria Met (RRAT>21 & CHF Dx)?: No 
Mode of Transport at Discharge: Other (see comment)(family) Transition of Care Consult (CM Consult): Home Health([facility]) [address]: Yes Discharge Durable Medical Equipment: No 
Physical Therapy Consult: No 
Occupational Therapy Consult: No 
Speech Therapy Consult: No 
Current Support Network: Lives with Spouse Confirm Follow Up Transport: Self Plan discussed with Pt/Family/Caregiver: Yes Discharge Location Discharge Placement: Home with home health

## 2019-01-08 NOTE — ROUTINE PROCESS
8002 Received report from [address] [place] [address],B-1. Patient alert and oriented. 1030 Patient currently resting in bed, alert and oriented to all spheres, denies pain or shortness of breath, call bell in reach, 5 Ps and hourly rounding completed, bed locked in low position. 1230 Patient ambulating in the hallway with PT. 
1630  Patient insisting he's going home today as the Doctor told him. Michelle Escalante with Dr Prince Roldan, no discharge today and informed Patient. 1910 Bedside and Verbal shift change report given to [address] [place] [address],B-1 (oncoming nurse) by me (offgoing nurse). Report included the following information  SBAR, Kardex, Intake/Output, MAR, Recent Results and Cardiac Rhythm NSR with PAC's.

## 2019-01-08 NOTE — PROGRESS NOTES
Problem: Mobility Impaired (Adult and Pediatric) Goal: *Acute Goals and Plan of Care (Insert Text) Physical Therapy Goals Initiated 1/7/2019 and to be accomplished within 7 days. 1.  Patient will complete all bed mobility with minimal assistance/contact guard assist in order to prepare for EOB/OOB activity. 2.  Patient will perform sit <> stand with moderate assistance  in order to prepare for OOB/gait activity. 3.  Patient will perform bed to chair transfers with moderate assistance  in order to promote mobility and encourage seated activity to progress towards their prior level of function. 4.  Patient will ambulate 15 feet with minimal assistance/contact guard assist using LRAD in order to prepare for safe negotiation of their environment. Outcome: Progressing Towards Goal 
 
PHYSICAL THERAPY: Daily TREATMENT Note INPATIENT: [employer]: Hospital Day: 7 Patient: Charissa Mohs (56 y.o. male)    Date: 1/8/2019 Primary Diagnosis: HCAP (healthcare-associated pneumonia) Acute exacerbation of CHF (congestive heart failure) (HCC)  
,  ,  
Precautions: (None) Chart, physical therapy assessment, plan of care and goals were reviewed. PLOF: Independent ASSESSMENT: 
Patient supine in bed, agreeable to participation with PT. MAX A x 2 for supine <> sit with encouragement to complete task. SPO2 ON 3L 92%. SBA for sit <> stand with RW x 3 trials. Demo's good standing balance with RW. Supervision for ambulation x 150 ft with RW; requires verbal cuing for safety with RW. Gait minimally antalgic. Patient desats to 88% with gait x 75 ft; instructed in deep breathing. SPO2 returns to 93% within 1 minute on 3L. Patient returned to room, SPO2 93-94% throughout remaining 75 ft with gait. Patient left supine in bed. C/o L hip pain with mobility. Patient's bed not working, nursing notified. Progression toward goals: 
      Improving appropriately and progressing toward goals PLAN: 
 Patient continues to benefit from skilled intervention to address the above impairments. Continue treatment per established plan of care. EDUCATION:  
Education:  Patient was educated on the following topics: safety with gait, bed mobility, transfers. Verbalizes understanding. Barriers to Learning/Limitations: None Compensate with: visual, verbal, tactile, kinesthetic cues/model Discharge Recommendations:  Home Health Further Equipment Recommendations for Discharge:  rolling walker Factors which may impact discharge planning: n/a SUBJECTIVE:  
Patient stated Ian Pulse got to get my 92. OBJECTIVE DATA SUMMARY:  
Critical Behavior: 
Neurologic State: Alert Orientation Level: Oriented X4 Cognition: Follows commands Safety/Judgement: Decreased insight into deficits G CODE:Mobility [id number] Current  CJ= 20-39%   Goal  CI= 1-19%. The severity rating is based on the Other MAX A - Supervision for bed mobility, transfers, ambulationFunctional Mobility: 
 
 
Functional Status Indep (I) Mod I Super-vision Min A Mod A Max A Total A Assist x2 Verbal cues Additional time Not tested Comments Rolling []  []  [] []    []    []  []  [] [] [] [x] Supine to sit []  []  [] []  []  [x]  []  [x] [] [] [] Sit to supine []  []  [x] []  []  []  []  [] [] [] [] Sit to stand []  []  [x] []  []  []  []  [] [] [] [] SBA Stand to sit []  []  [x] []  []  []  []  [] [] [] [] Bed to chair transfers []  []  [] []  []  []  []  [] [] [] [x] Balance Good Delford Askew Poor Unable Not tested Comments Sitting static [x]  []  []  []  [] Sitting dynamic [x]  []  []  []  []   
Standing static [x]  []  []  []  []   
Standing dynamic [x]  []  []  []  []   
 
 
Vital Signs Temp: 97.9 °F (36.6 °C) Pulse (Heart Rate): 69    
BP: 128/64 Resp Rate: 20    
O2 Sat (%): 98 % Pain: 
L hip pain with activity Activity Tolerance:  
Good After treatment: Patient left in no apparent distress in bed Call bell left within reach Nursing notified Manfred Leslie Time Calculation: 44 mins

## 2019-01-08 NOTE — PROGRESS NOTES
[address] [place], [place] [address] Hospitalist Division Inpatient Daily Progress Note Daily progress Note Patient: Quiana Kaye MRN: [id number]  [facility]: [id number] YOB: 1932  Age: 80 y.o. 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 Oxygen sats 88% on RA and was placed on oxygen with some relief. 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 Pt has been on high dose oral Prednisone and so far has received two doses of Rituximab (last dose on 1/12019). CT chest note pulmonary lesions which are felt to be due to vasculitis vs mets. Work up in the ED - CXR findings suspicious for pulmonary edema superimposed on chronic interstitial lung disease, suspected additional bibasilar superimposed alveolar component although pneumonia is difficult to exclude, probable small pleural effusions. Troponin 0.09 - -> 0.11 - -> 0.11, creatinine 2.48 and BNP 74314 in ED. Pt started on IV diuretics this admission and admitted for further management. Echo obtained 1/3 showed EF < 15%, LV hypertrophy, LV global hypokinesis, small echogenic and calcified thrombus LV, porcine bioprosthetic aortic valve, trace mitral valve regurgitation. Cardiology consulted. Pt started on Heparin gtt and oral Coumadin.   Creatinine increased to 3 on 1/4, nephrology consulted given recent biopsy findings and increasing creatinine given diuresis. Nephrology recommends continue oral prednisone, pt's next dose of Rituximab was supposed to be on 1/8 but will hold until pneumonia is treated. Pt started on Bactrim for PCP prophylaxis. Ok to resume Lasix per nephrology. Lasix restarted. Patient was ok to go home 1/5 but required 02 and delayed dc in addition life vest was ordered but patient refused. Heparin gtt dc'd on 1/7 as INR 2.3- as recommended per cardiology notes. Acute weakness this am, pt unable to sit up in bed and PT unable to work with pt- he was ambulatory over weekend. Pt c/o left lateral thigh pain and numbness inner right thigh. CT abd/pelvis w/o contrast showed lobulated intramuscular hypodense lesion involving RLE muscles greatest in pectineus muscle, right pleural effusion w/ multiple pleural based nodules, abnormal linear hypodensity within anteroinferior aspect of L4 vertebral body suggesting compression fracture w/ possible localized osteonecrotic cavity. Findings discussed with pt and daughter- plan for MRI L spine and right thigh- pending. Pt has agreed to SNF-  following and matching to facilities. Pt will not be discharged. Official read from MRI still pending however radiologist left note in chart from MRI L spine note acute fracture inferior endplate L4. MRI right femur 10 cm hemorrhagic fluid collection, right adductor longus muscle w/ moderate surrounding perilesional intramuscular and intermuscular medial right thigh hemorrhage or edema. Ortho consulted- no clinical evidence of compartment syndrome, symptoms more related to L4 compression fracture- continue prednisone and supplement with lumbar corset- f/u in office with Dr. Collette Creeks- consider epidural steroid injections. Assessment/Plan:  
 
Patient Active Problem List  
Diagnosis Code  Acute exacerbation of CHF (congestive heart failure) ([facility]) I50.9  HCAP (healthcare-associated pneumonia) J18.9  LV (left ventricular) mural thrombus I51.3  Cardiomyopathy ([facility] [address] I42.9  Acute renal failure superimposed on stage 3 chronic kidney disease ([facility]) N17.9, N18.3  Aortic stenosis I35.0  Hypertension I10  
 ANCA-associated vasculitis ([facility]) I77.6  NSVT (nonsustained ventricular tachycardia) ([facility]) I47.2 A/P: 
Acute Exacerbation of CHF /Cardiomyopathy 
- cardiology following- BNP 28667 on admission - Lasix daily (oral), monitor I/O 
- Echo 1/3 with EF < 15%, LV hypertrophy, LV global hypokinesis, small echogenic and calcified thrombus LV, porcine bioprosthetic aortic valve, trace mitral valve regurgitation 
- resume home meds - Patient refuses life vest 
 
Acute fracture inferior end plate L4 
- pt with acute weakness, inability to walk or sit up in bed despite assistance with PT 
- denies falls, bowel/bladder incontinence - MRI L spine showed acute fracture inferior endplate L4 
- ortho consulted- continue prednisone and supplement with lumbar corset 
- f/u outpatient with Dr. Felecia Dutta, consider epidural steroid injections HCAP 
- continue IV Abx 
- started on Bactrim per nephrology for PCP prophylaxis 
- supplemental oxygen, duo-nebs, prednisone daily LV Thrombus 
- noted on Echo this admission 
- Heparin gtt- dc'd 1/7- INR of 2.3 
- Coumadin for 3-6 months for possible LV thrombus which was noted to be calcified NSVT 
- cardiology following 
- was on Amiodarone IV, now on 400 mg oral 
 
Hx of aortic stenosis and aortic valve replacement 
- started on Coumadin 1/3 
- monitor INR daily - cardiology following Acute on chronic renal failure superimposed on CKD 3 
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 
- continue Prednisone 60 mg daily - pt received two doses of of Rituximab (last dose on 1/12019), next dose was supposed to be on 1/8 but will hold until pneumonia is treated 
- continue IV diuretics per nephrology 
- monitor I/O's, BMP Pulmonary Nodules -CT chest done 12/24/18 with multiple pulmonary and pleural based nodules suspicious for neoplasm 
- was recommended to pursue PET scan at d/c Hypertension 
- continue home meds DVT Prophylaxis - pt on Coumadin ROCIO Tejeda [address] [facility] Pager:  [phone] Office:  [phone] Subjective: No acute events overnight. Worked with therapy this am. 
 
Objective:  
  
Visit Vitals /73 Pulse 68 Temp 97.8 °F (36.6 °C) Resp 18 Ht 5' 6\" (1.676 m) Wt 89.8 kg (198 lb) SpO2 98% BMI 31.96 kg/m² Physical Exam: 
General appearance: alert, cooperative, no distress, appears stated age Head: Normocephalic, without obvious abnormality, atraumatic Lungs: clear to auscultation bilaterally Heart: regular rate and rhythm, S1, S2 normal, no murmur, click, rub or gallop Abdomen: soft, non tender, non distended. Normoactive bowel sounds. Extremities: extremities normal, atraumatic, no cyanosis or edema Skin: scattered bruising BUE Neurologic: generalized weakness- MORALES 
PSY: Mood and affect normal, appropriately behaved Intake and Output: 
Current Shift:  No intake/output data recorded. Last three shifts:  01/06 1901 - 01/08 0700 In: 740 [P.O.:440; I.V.:300] Out: 1425 [[id number]] Recent Results (from the past 24 hour(s)) PROTHROMBIN TIME + INR Collection Time: 01/08/19  4:55 AM  
Result Value Ref Range Prothrombin time 28.0 (H) 11.5 - 15.2 sec INR 2.6 (H) 0.8 - 1.2 RENAL FUNCTION PANEL Collection Time: 01/08/19  4:55 AM  
Result Value Ref Range Sodium 144 136 - 145 mmol/L Potassium 4.1 3.5 - 5.5 mmol/L Chloride 106 100 - 108 mmol/L  
 CO2 29 21 - 32 mmol/L Anion gap 9 3.0 - 18 mmol/L Glucose 102 (H) 74 - 99 mg/dL BUN 55 (H) 7.0 - 18 MG/DL Creatinine 3.54 (H) 0.6 - 1.3 MG/DL  
 BUN/Creatinine ratio 16 12 - 20 GFR est AA 20 (L) >60 ml/min/1.73m2 GFR est non-AA 16 (L) >60 ml/min/1.73m2 Calcium 7.8 (L) 8.5 - 10.1 MG/DL Phosphorus 6.1 (H) 2.5 - 4.9 MG/DL Albumin 2.3 (L) 3.4 - 5.0 g/dL CBC W/O DIFF Collection Time: 01/08/19  4:55 AM  
Result Value Ref Range WBC 13.1 4.6 - 13.2 K/uL  
 RBC 2.86 (L) 4.70 - 5.50 M/uL HGB 7.9 (L) 13.0 - 16.0 g/dL HCT 25.7 (L) 36.0 - 48.0 % MCV 89.9 74.0 - 97.0 FL  
 MCH 27.6 24.0 - 34.0 PG  
 MCHC 30.7 (L) 31.0 - 37.0 g/dL  
 RDW 14.5 11.6 - 14.5 % PLATELET 333 794 - 087 K/uL MPV 12.5 (H) 9.2 - 11.8 FL  
CK Collection Time: 01/08/19  4:55 AM  
Result Value Ref Range CK 1,005 (H) 39 - 308 U/L Lab Results Component Value Date/Time Glucose 102 (H) 01/08/2019 04:55 AM  
 Glucose 98 01/07/2019 06:01 AM  
 Glucose 124 (H) 01/06/2019 04:05 AM  
 Glucose 115 (H) 01/05/2019 03:30 AM  
 Glucose 91 01/04/2019 06:44 AM  
  
 
Imaging: 
Ct Abd Pelv Wo Cont Addendum Date: 1/7/2019 Addendum: Described in the body of the report but not in the impression is abnormal linear hypodensity within the anteroinferior aspect of L4 vertebral body suggesting compression fracture with possible localized osteonecrotic cavity. Only minimal height loss is seen. This finding is age-indeterminate but may be acute/subacute given visualized cortical buckling. No definite additional paraspinal stranding to further suggest recent age. Clinical correlation with possible lobe back pain is recommended. MR lumbar spine would better determine age of this fracture. Result Date: 1/7/2019 EXAM: CT ABDOMEN AND PELVIS (NON-CONTRAST) INDICATION: Left flank pain, history of recent left renal biopsy 12/31/2018 COMPARISON: No prior study available for comparison, review of reports located in [place] Everywhere\" section of electronic medical record of CT-guided left renal biopsy 12/31/2018 and additional CT chest abdomen pelvis also from 12/31/2018 (images unavailable for review).  TECHNIQUE: Axial CT imaging of the abdomen and pelvis was performed without IV or oral contrast as per specific clinician request.  Multiplanar reformats were generated. One or more dose reduction techniques were used on this CT: automated exposure control, adjustment of the mAs and/or kVp according to patient's size, and iterative reconstruction techniques. The specific techniques utilized on this CT exam have been documented in the patient's electronic medical record. Digital Imaging and Communications in Medicine (DICOM) format image data are available to nonaffiliated external healthcare facilities or entities on a secure, media free, reciprocally searchable basis with patient authorization for at least a 12-month period after this study. _______________ FINDINGS: The lack of oral and IV contrast limits evaluation of the solid organs and bowel. LOWER CHEST: Right-sided pleural effusion is present. There is some nodular density components along its posterolateral aspect, such as axial image 23, measuring up to 15 mm in diameter. There may be additional nodular pleural lesions along the diaphragm and fissure. A lateral right infrahilar nodular lesion is present axial image 6 measuring approximately 2.5 cm in size. Additional mild amount of atelectasis is present in the right lower lobe adjacent to the pleural effusion. Linear and subpleural densities are present bilaterally, left greater than right along with groundglass densities, nonspecific perhaps scarring or atelectasis. Of note, this right-sided pleural effusion along with pleural nodularity and infrahilar nodularity is described on prior outside study as well. Partially visualized aortic valve replacement is seen. No pericardial or left pleural effusion is present. LIVER, BILIARY: Liver is unremarkable. No abnormal biliary dilation. Gallbladder is unremarkable. PANCREAS: Unremarkable. SPLEEN: Unremarkable. ADRENALS: Unremarkable.  KIDNEYS: Nonspecific mild perinephric stranding is present. Renal vascular calcifications are seen bilaterally. There is no hydronephrosis or hydroureter. No definite nephroureterolithiasis is present. No perinephric hematoma is seen. Small lateral left interpolar 8 mm cortical hypodense lesion is present, with Hounsfield units suggesting cysts. LYMPH NODES: No enlarged abdominopelvic lymph nodes by size criteria. GASTROINTESTINAL TRACT: The lack of oral contrast limits bowel evaluation. No abnormal bowel dilation or wall thickening. Appendix is within normal limits. PELVIC ORGANS: Prostate contains nonspecific central calcification. Urinary bladder is unremarkable. VASCULATURE: Atherosclerotic calcifications are present. OSSEOUS: Degenerative changes are seen in the spine. Irregular slightly horizontally oriented hypodensity is present along the anteroinferior aspect of the L4 vertebral body with suggestion of anterior cortical buckling. No definite adjacent paravertebral stranding is seen. OTHER: There is abnormal enlargement of the visualized proximal right lower extremity/thigh muscles, particularly the pectineus muscle and to a lesser degree the obturator externus muscle and proximal quadriceps femoris muscle. The pectineus muscle is distended with lobulated internal hyperdensity, which is partially visualized but measures at least 6.8 x 2.4 cm cross-sectional diameter, but extending inferiorly out of the field-of-view. There is surrounding stranding and ill-defined hypodensity along the muscular fascial planes. The adjacent right femoral vessels are unremarkable without hematoma. _______________ IMPRESSION: 1. No evidence of nephric/perinephric hematoma. 2. Partially visualized lobulated intramuscular hypodense lesion involving the proximal right lower extremity muscles greatest within the pectoralis muscle.  Differential diagnosis primarily includes intramuscular hematoma (patient reportedly anticoagulated) or intramuscular abscess. Similar findings can be produced by myonecrosis or rhabdomyolysis in the appropriate setting (diabetes, prolonged unresponsiveness/found down) etc. Clinical correlation is recommended. Further right lower extremity cross-sectional imaging, CT or MRI, would better evaluate the extent of this muscular abnormality, and MRI would likely better differentiate between hematoma versus abscess, if clinically uncertain. 3. Right pleural effusion with multiple pleural based nodules, also described on prior outside CT chest abdomen pelvis 12/31/2018 (images not available for review). None of these thoracic findings were present on review of a more remote CT chest dated 06/22/2010. These are indeterminate but may represent pleural carcinomatosis. Does patient have known primary carcinoma/lung carcinoma? As described on prior outside report, potentially PET/CT may be helpful to evaluate for abnormal metabolic activity. These unexpected results of impression #2 were directly communicated to Dr. Yifan Shultz  on 1/7/2019 at approximately 11:25 AM.  Results understood and acknowledged. Delay in dictation secondary to PACS IT failure. Medication List Reviewed: 
Current Facility-Administered Medications Medication Dose Route Frequency  traMADol (ULTRAM) tablet 50 mg  50 mg Oral Q6H PRN  
 VANCOMYCIN INFORMATION NOTE   Other ONCE  predniSONE (DELTASONE) tablet 60 mg  60 mg Oral DAILY WITH BREAKFAST  furosemide (LASIX) tablet 40 mg  40 mg Oral DAILY  isosorbide dinitrate (ISORDIL) tablet 5 mg  5 mg Oral BID  
 amiodarone (CORDARONE) tablet 400 mg  400 mg Oral DAILY  levoFLOXacin (LEVAQUIN) 500 mg in D5W IVPB  500 mg IntraVENous Q48H  piperacillin-tazobactam (ZOSYN) 3.375 g in 0.9% sodium chloride (MBP/ADV) 100 mL MBP  #EXTENDED 4-HOUR INFUSION##  3.375 g IntraVENous Q12H  WARFARIN INFORMATION NOTE (COUMADIN)   Other PRN  
  trimethoprim-sulfamethoxazole (BACTRIM, SEPTRA)  mg per tablet 1 Tab  1 Tab Oral Q MON, WED & FRI  metoprolol tartrate (LOPRESSOR) tablet 25 mg  25 mg Oral Q12H  
 sodium chloride (NS) flush 5-10 mL  5-10 mL IntraVENous PRN  
 VANCOMYCIN INFORMATION NOTE   Other Rx Dosing/Monitoring  atorvastatin (LIPITOR) tablet 40 mg  40 mg Oral DAILY  docusate sodium (COLACE) capsule 100 mg  100 mg Oral BID  ferrous sulfate tablet 325 mg  325 mg Oral ACB  multivitamin, tx-iron-ca-min (THERA-M w/ IRON) tablet 1 Tab  1 Tab Oral DAILY  pantoprazole (PROTONIX) tablet 20 mg  20 mg Oral DAILY  tamsulosin (FLOMAX) capsule 0.4 mg  0.4 mg Oral DAILY  acetaminophen (TYLENOL) tablet 650 mg  650 mg Oral Q4H PRN  
 ondansetron (ZOFRAN) injection 4 mg  4 mg IntraVENous Q4H PRN  
 albuterol-ipratropium (DUO-NEB) 2.5 MG-0.5 MG/3 ML  3 mL Nebulization Q4H PRN  
 warfarin (COUMADIN) tablet 5 mg  5 mg Oral QPM

## 2019-01-08 NOTE — PROGRESS NOTES
Problem: Self Care Deficits Care Plan (Adult) Goal: *Acute Goals and Plan of Care (Insert Text) Occupational Therapy Goals Initiated 1/8/2019 within 7 day(s). 1.  Patient will perform grooming tasks while standing with modified independence. 2.  Patient will perform lower body dressing with modified independence and good safety awareness. 3.  Patient will perform functional task in standing for 8 minutes with modified independence and fair+ dynamic standing balance in prep for ADLs. 4.  Patient will perform toilet transfers with modified independence. 5.  Patient will perform all aspects of toileting with modified independence. 6.  Patient will participate in upper extremity therapeutic exercise/activities with modified independence for 8 minutes to maintain BUE strength for functional transfers and ADLs. 7.  Patient will utilize energy conservation techniques during functional activities with minimal verbal cues. Outcome: [address] Occupational THERAPY: Initial Assessment [facility] Patient: Anat Hamm (88 y.o. male)    Date: 1/8/2019 Primary Diagnosis: HCAP (healthcare-associated pneumonia) Acute exacerbation of CHF (congestive heart failure) ([facility])  
,  ,  
Precautions: Falls PLOF: Pt reports independence with ADLs & functional mobility PTA. ASSESSMENT:  
Based on the objective data described below, the patient presents with impairments with regard to bed mobility and independence in ADLs. Pt sitting at EOB on arrival, perseverating on going home. Pt denies pain at this time. Good AROM/strength of BUEs. Supervision for LB dressing. SBA/CGA For functional transfer to standing in prep for bathroom mobility & ADLs; Fair/fair- standing balance. Impulsivity and decreased safety awareness noted; skilled instruction on pacing to ensure safety. Pt deferred further tx secondary to insisting that he is going home.  Educated on role of OT and POC; needs reinforcement. Left at EOB with needs within reach Recommendations for the next treatment session: ADLs at sink. Mr. Melissa Rivera will benefit from skilled intervention to address the above impairments. His rehabilitation potential is considered to be Fair. EDUCATION Education:  Patient was educated on the following topics: activity pacing, safety, role of OT and POC Barriers to Learning/Limitations: yes;  impulsive Compensate with: visual, verbal, tactile, kinesthetic cues/model PLAN OF CARE:  
Problems:  Decreased ROM, Decreased strength affecting function, Decreased ADL/functioning of activities and Decreased transfer abilities Recommendations and Planned Interventions: 
[x]                  Self Care Training                   [x]         Therapeutic Activities [x]                  Functional Mobility Training    []          Cognitive Retraining 
[x]                  Therapeutic Exercises            [x]          Endurance Activities [x]                  Balance Training                     []          Neuromuscular Re-ed []                  Visual/Perceptual Training      [x]       Home Safety Training 
[x]                  Patient Education                    [x]          Family Training/Education []                  Other (comment): Frequency/Duration: Patient will be followed by occupational therapy 3-5 times a week to address goals. Discharge Recommendations: Home Health Further Equipment Recommendations for Discharge: anticipate none SUBJECTIVE:  
Patient stated: \"Pain? No, no pain. \" OBJECTIVE/TREATMENT:  
 
Past Medical History:  
Diagnosis Date  [facility]  ANCA-associated vasculitis ([facility]  BPH (benign prostatic hyperplasia)  Cardiomyopathy ([facility] LVEF 15% (12/18)  CKD (chronic kidney disease) stage 3, GFR 30-59 ml/min ([facility])  Crescentic glomerulonephritis   
 biopsy 12/31/2018  DM (diabetes mellitus) ([facility]  Hypercholesteremia  Hypertension  PAD (peripheral artery disease) ([facility]  PFO (patent foramen ovale)   
 closure of a patent foramen ovale by Dr. Yudelka Chu 4/17/09.  Pulmonary fibrosis ([address]  S/P AVR (aortic valve replacement) 2009  
  23 mm Reggie-Diaz porcine valve Past Surgical History:  
Procedure Laterality Date  CARDIAC SURG PROCEDURE UNLIST  HX CATARACT REMOVAL    
 HX ORTHOPAEDIC    
 HX SHOULDER REPLACEMENT Eval Complexity: History: MEDIUM Complexity : Expanded review of history including physical, cognitive and psychosocial  history ; Examination: MEDIUM Complexity : 3-5 performance deficits relating to physical, cognitive , or psychosocial skils that result in activity limitations and / or participation restrictions; Decision Making:MEDIUM Complexity : Patient may present with comorbidities that affect occupational performnce. Miniml to moderate modification of tasks or assistance (eg, physical or verbal ) with assesment(s) is necessary to enable patient to complete evaluation G CODES: Self Care  Current  CJ= 20-39%  Goal  CI= 1-19%. The severity rating is based on the Other Functional Assessment, MMT, ROM Prior Level of Function/Home Situation: Restricted in physically strenuous activity but ambulatory and able to carry out work of a light or sedentary nature (e.g., light house work, office work). Lives with Spouse Briana Cheng Cognitive/Behavioral Status:  
Neurologic State: alert Orientation: only aware of  place and person Cognition:  decreased attention/concentration, following commands  follows multi-step simple commands/direction and impaired decision making Safety/Judgement: Awareness of environment and Fall prevention ROM: within normal limits (BUEs) MMT: 4/5 (BUEs) Coordination: BUEs [facility] Hand dominance:Right Skin: Intact (BUEs) Edema: None noted (BUEs) Sensation: Intact (BUEs) Vision/Perceptual: normal 
 
 
 Functional Status Indep Mod I  
Sup. / 
Set- Up SBA CGA Min Assist  
Mod Assist  
Max assist  
Total Assist  
Assist x2 Additional Time NT Comments Rolling []  []  []  []  []    []    []    []  []  []  []  [x] Supine to sit []  []  []  []  []  []  []  []  []  []  []  [x] Sit to supine []  []  []  []  []  []  []  []  []  []  []  [x] Sit to stand []  []  []  []  [x]  []  []  []  []  []  []  [] Toilet Transfer []  []  []  []  [x]  []  []  []  []  []  []  [] Feeding []  []  [x]  []  []  []  []  []  []  []  []  []    
Grooming []  []  [x]  []  []  []  []  []  []  []  []  [] Bathing  []  []  []  []  []  [x]  []  []  []  []  []  []    
UB Dressing  []  []  [x]  []  []  []  []  []  []  []  []  []    
LB Dressing  []  []  []  [x]  []  []  []  []  []  []  []  [] Toileting []  []  []  [x]  []  []  []  []  []  []  []  [] Balance Good Lauraine Denver Poor Unable Comments Sitting static [x]  []  []  [] Sitting dynamic []  [x]  []  []    
Standing static []  [x]  []  []    
Standing dynamic []  [x]  []  []  Fair-  
 
Pain:  
Pre treatment: 0/10 Post treatment: 0/10 Scale: numeric Activity tolerance:  fair COMMUNICATION/EDUCATION: Pt educated on role of OT and POC; he verbalized understanding.-  
[x]         Fall prevention education was provided and the patient/caregiver indicated understanding. [x]         Patient/family have participated as able in goal setting and plan of care. [x]         Patient/family agree to work toward stated goals and plan of care. []         Patient understands intent and goals of therapy, but is neutral about his/her participation The patients plan of care was also discussed with: Physical Therapist, Certified Occupational Therapy Assistant and Registered Nurse. · After treatment position/precautions:  
o Call light within reach 
o RN notified Recommendations for nursing: up with assist x1 
 
 Thank you for this referral. 
Chuck Mobley, MS OTR/L Time Calculation: 8 mins

## 2019-01-08 NOTE — PROGRESS NOTES
Problem: Falls - Risk of 
Goal: *Absence of Falls Document Waylon Odell Fall Risk and appropriate interventions in the flowsheet. Outcome: Progressing Towards Goal 
Fall Risk Interventions: 
Mobility Interventions: Patient to call before getting OOB, Bed/chair exit alarm Medication Interventions: Bed/chair exit alarm, Patient to call before getting OOB, Teach patient to arise slowly Elimination Interventions: Call light in reach, Patient to call for help with toileting needs History of Falls Interventions: Door open when patient unattended, Investigate reason for fall, Room close to nurse's station, Bed/chair exit alarm

## 2019-01-08 NOTE — PROGRESS NOTES
Care Management Interventions PCP Verified by CM: (Dr. Rolle Officer, last seen unknown) Palliative Care Criteria Met (RRAT>21 & CHF Dx)?: No 
Mode of Transport at Discharge: Other (see comment)(family) Transition of Care Consult (CM Consult): Home Health [address] [place] [address] Road: Yes Discharge Durable Medical Equipment: (28) Physical Therapy Consult: No 
Occupational Therapy Consult: No 
Speech Therapy Consult: No 
Current Support Network: Lives with Spouse Confirm Follow Up Transport: Self Plan discussed with Pt/Family/Caregiver: Yes Freedom of Choice Offered: Yes Discharge Location Discharge Placement: Home with home health

## 2019-01-08 NOTE — CONSULTS
Consult Note Assessment: 1. L4 compression fracture 2. Right thigh hematoma with no evidence of compartment syndrome or abscess 3. Bilateral moderate to severe foraminal stenosis L5-S1 PLAN:   
1. No clinical evidence of compartment syndrome on exam. He is having very little right lower extremity pain. Symptoms are more related to his L4 compression fracture and left greater than right foraminal stenosis L5-S1 with radiation to his left lateral hip. He is already on prednisone and will supplement that with a lumbar corset. Mobilize with PT and f/u in the office with Dr Collette Creeks as an outpatient. Consideration at that time may be made for epidural steroid injections. Will be available as needed [phone] HPI: Kanu Dumont is a 80 y.o. male patient presents with back pain and left lower extremity pain greater than right. He was admitted for shortness of breath and on CT of the abdomen and pelvis he was founf to have a fluid collection in the right thigh and a compression fracture of L4. No bowel or bladder problems. He does not recall a recent history of injury. Right leg pain is mild and is located at the medial thigh. Chief complaint is that of low back pain radiating to the lateral left hip. No numbness or tingling. He states that this is limiting his mobility Past Medical History:  
Diagnosis Date  A-fib ([address]  ANCA-associated vasculitis ([address]  BPH (benign prostatic hyperplasia)  Cardiomyopathy ([address] LVEF 15% (12/18)  CKD (chronic kidney disease) stage 3, GFR 30-59 ml/min ([facility])  Crescentic glomerulonephritis   
 biopsy 12/31/2018  DM (diabetes mellitus) ([address]  Hypercholesteremia  Hypertension  PAD (peripheral artery disease) ([address]  PFO (patent foramen ovale)   
 closure of a patent foramen ovale by Dr. Dayana Bishop 4/17/09.  Pulmonary fibrosis ([address]  S/P AVR (aortic valve replacement) 2009  
  23 mm Reggie-Diaz porcine valve Past Surgical History:  
Procedure Laterality Date  CARDIAC SURG PROCEDURE UNLIST  HX CATARACT REMOVAL    
 HX ORTHOPAEDIC    
 HX SHOULDER REPLACEMENT Prior to Admission medications Medication Sig Start Date End Date Taking? Authorizing Provider  
amiodarone (PACERONE) 400 mg tablet Take 1 Tab by mouth daily. 1/8/19  Yes Paul Celestin NP  
isosorbide dinitrate (ISORDIL) 5 mg tablet Take 1 Tab by mouth two (2) times a day. 1/7/19  Yes Paul Celestin NP  
predniSONE (DELTASONE) 20 mg tablet Take 40 mg by mouth daily (with breakfast) for 2 days. 1/7/19 1/9/19 Yes Paul Celestin NP  
predniSONE (DELTASONE) 20 mg tablet Take 20 mg by mouth daily (with breakfast). 1/10/19  Yes Paul Celestin NP  
traMADol (ULTRAM) 50 mg tablet Take 1 Tab by mouth every six (6) hours as needed. Max Daily Amount: 200 mg. 1/7/19  Yes Paul Celestin NP  
furosemide (LASIX) 40 mg tablet Take 1 tab daily 1/6/19  Yes Son Gage MD  
warfarin (COUMADIN) 5 mg tablet Take 1 Tab by mouth every evening. 1/5/19  Yes Son Gage MD  
metoprolol tartrate (LOPRESSOR) 25 mg tablet Take 1 Tab by mouth every twelve (12) hours. 1/5/19  Yes Son Gage MD  
trimethoprim-sulfamethoxazole (BACTRIM, SEPTRA)  mg per tablet Take 1 Tab by mouth every Monday, Wednesday, Friday. 1/7/19  Yes Son Gage MD  
aspirin delayed-release 81 mg tablet Take 81 mg by mouth daily. Yes Slade, MD Cyndi  
atorvastatin (LIPITOR) 40 mg tablet Take 40 mg by mouth daily. Yes Slade, MD Cyndi  
multivitamin, tx-iron-ca-min (THERA-M W/ IRON) 9 mg iron-400 mcg tab tablet Take 1 Tab by mouth daily. Yes Slade, MD Cyndi  
docusate sodium (COLACE) 100 mg capsule Take 100 mg by mouth two (2) times a day. Yes Slade, MD Cyndi  
Omeprazole delayed release (PRILOSEC D/R) 20 mg tablet Take 20 mg by mouth daily. Yes Slade, MD Cyndi  
tamsulosin (FLOMAX) 0.4 mg capsule Take 0.4 mg by mouth daily.    Yes Other, MD Cyndi  
 furosemide (LASIX) 20 mg tablet Take  by mouth daily. Yes Other, MD Cyndi  
carvedilol (COREG) 3.125 mg tablet Take  by mouth two (2) times daily (with meals). Yes Other, MD Cyndi  
ferrous sulfate 325 mg (65 mg iron) tablet Take 325 mg by mouth Daily (before breakfast). Other, MD Cyndi  
 
Allergies Allergen Reactions  Protamine Anaphylaxis Social History Socioeconomic History  Marital status:  Spouse name: Not on file  Number of children: Not on file  Years of education: Not on file  Highest education level: Not on file Social Needs  Financial resource strain: Not on file  Food insecurity - worry: Not on file  Food insecurity - inability: Not on file  Transportation needs - medical: Not on file  Transportation needs - non-medical: Not on file Occupational History  Not on file Tobacco Use  Smoking status: Former Smoker  Smokeless tobacco: Never Used Substance and Sexual Activity  Alcohol use: Yes  Drug use: No  
 Sexual activity: Not on file Other Topics Concern  Not on file Social History Narrative  Not on file Blood pressure 128/64, pulse 69, temperature 97.9 °F (36.6 °C), resp. rate 20, height 5' 6\" (1.676 m), weight 91.6 kg (202 lb), SpO2 98 %. CBC w/Diff Lab Results Component Value Date/Time WBC 13.1 01/08/2019 04:55 AM  
 RBC 2.86 (L) 01/08/2019 04:55 AM  
 HCT 25.7 (L) 01/08/2019 04:55 AM  
  
 
 
Physical Assessment: 
General: oriented times 3 Extremities:  Tender to palpation lower lumbar spine. No pain with log roll bilateral hips. Left hip flexor 4+/5. All other motors and sensation intact. Negative straight leg raise bilaterally. Babinski's negative. Dressing:  none DVT Exam:   No exam evidence to suggest DVT. Compartments soft and NT. Radiology: MRI lumbar spine: 1.  Acute/subacute L4 compression fracture with fracture lines adjacent to the 
 inferior endplate and associated very small anterior inferior osteonecrotic 
cavity suggesting benign osteoporotic type fracture. No suspicious underlying 
lesion is seen. No associated retropulsion or high-grade stenosis. 
  
2. No high-grade central stenosis. 
  
3. Mild L1-L2 stenosis related to disc bulge and endplate osteophyte. 
  
4. Moderate to severe bilateral L5-S1 foraminal stenosis, left greater than 
right, with contact of the exiting L5 nerve roots. Clinical correlation 
regarding possible L5 radiculopathy is recommended. 
  
5. Additional mild multilevel degenerative changes , as described in detail in 
Findings section. MRI femur: 1. Noncontrast exam. Elliptical 10 cm hemorrhagic fluid collection, right 
adductor longus muscle, with moderate surrounding perilesional intramuscular and 
intermuscular medial right thigh hemorrhage or edema. Impossible to determine if 
benign or malignant lesion without contrast, though favor the former. Clinical 
follow-up to resolution is recommended, with consideration for re-imaging if not 
resolving or progressing. 
 
- Chely Benoit PA-C 
1/8/2019 Office [phone] Cell [phone]

## 2019-01-09 NOTE — PROGRESS NOTES
Pt now stating he wantts to go to rehab At [address] [facility]. Spoke with Amna in admissions @ [facility], states will work on [facility]. Will cont to follow. Carmen Cutler RN,ext [phone].

## 2019-01-09 NOTE — PROGRESS NOTES
Pharmacy Dosing Services: Vancomycin Indication: HAP Day of therapy: 6 Other Antimicrobials (Include dose, start day & day of therapy): Levofloxacin 750 mg IV then 500 mg IV every 48 hours (started 1/3) Piperacillin/tazobactam 3.375 gm IV every 12 hours extended infusion (started 1/3) Bactrim DS PO MWF Loading dose (date given): 1750 mg 
Current Maintenance dose: 1g x1 Goal Vancomycin Level: 15-20 
(Trough 15-20 for most infections, 20 for meningitis/osteomyelitis, pre-HD level ~25) Vancomycin Level (if drawn):  
 - 12 (Random)  - 21.2 (18.2 hours post dose)  - 25.5 (Random) 
: 23.8 (21.5 hrs post) Significant Cultures:  
 - blood culture - NGTD 
1/3 - urine culture - NGTD Renal function stable? (unstable defined as SCr increase of 0.5 mg/dL or > 50% increase from baseline, whichever is greater) (Y/N): N, slowly improving. CAPD, Hemodialysis or Renal Replacement Therapy (Y/N): N Recent Labs 19 
6039 19 
0601 19 
0405 CREA 3.54* 3.56* 3.59* BUN 55* 57* 59* WBC 13.1 12.6 14.5* Temp (24hrs), Av °F (36.7 °C), Min:97.5 °F (36.4 °C), Max:98.5 °F (36.9 °C) Creatinine Clearance (Creatinine Clearance (ml/min)): < 20 ml/min Regimen assessment: supra-therapeutic with 1 gm. Renal function not improving much due to poor organ perfusion. Maintenance dose: will attempt interval dosing with 750 mg iv q24h Next scheduled level: 1/10, 0430 Pharmacy will follow daily and adjust medications as appropriate for renal function and/or serum levels.  
 
Thank you, 
Jasmin Polanco, PHD

## 2019-01-09 NOTE — PROGRESS NOTES
RENAL PROGRESS NOTE Josue Rainey Assessment/Plan: · SETH on CKD 3. Current worsening of renal function is likely due to decompensated HFrEF and cardiorenal syndrome. Underling seth/ckd are due to biopsy proven anca associated crescentic GN. Scr is up since yesterday, likely in the setting of rt thigh hematoma with worsening anemia. Continue to monitor closely. .  
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 Continue prednisone. Will proceed with 3rd infusion of rituximab in the next 1-2 days. Logistics are being worked out, may give it after transfer to [facility]. · Decompensated HFrEF/volume overload. Improving, continue oral lasix. · Nosocomial pneumonia, on abx. Improved, hold lasix. 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 Will need repeat ct. · Underlying pulm fibrosis. · HTN, controlled. Continue current regimen. · LV thrombus, on ac with coumadin- INR is supratherapeutic. Coumadin is on hold. Will defer decision about long term ac to card (especially given rt thigh hematoma). · NSTVT. On amiodarone. Declined live vest.   
· L4 compression fracture. Ortho recs noted. · Rt thigh hematoma (spontaneous in a setting of ac). No need for surgery per ortho. · Worsening anemia. Subjective:Patient complaints off: Feels, looks better. Sitting in bed. No sob with minimal exertion. Lbp/lt hip pain is better. Also c/o rt inner thigh pain on/off. No CP/N/V. Stahl placed. Patient Active Problem List  
Diagnosis Code  Acute exacerbation of CHF (congestive heart failure) ([facility]) I50.9  HCAP (healthcare-associated pneumonia) J18.9  LV (left ventricular) mural thrombus I51.3  Cardiomyopathy ([facility] [address] I42.9  Acute renal failure superimposed on stage 3 chronic kidney disease (HCC) N17.9, N18.3  Aortic stenosis I35.0  Hypertension I10  
 ANCA-associated vasculitis (HCC) I77.6  NSVT (nonsustained ventricular tachycardia) ([facility]) I47.2 Current Facility-Administered Medications Medication Dose Route Frequency Provider Last Rate Last Dose  [START ON 1/10/2019] riTUXimab (RITUXAN) 500 mg in 0.9% sodium chloride 500 mL infusion  500 mg IntraVENous ONCE TITR Sean BRAVO MD      
 vancomycin (VANCOCIN) 750 mg in 0.9% sodium chloride (MBP/ADV) 250 mL ADV  750 mg IntraVENous Q24H Ofilia Jennifer H,  mL/hr at 01/09/19 0509 750 mg at 01/09/19 2302  [START ON 1/10/2019] VANCOMYCIN INFORMATION NOTE   Other ONCE Sunil Pierre DO      
 traMADol Sharlot Copper) tablet 50 mg  50 mg Oral Q6H PRN Ofilia Jennifer H, DO   50 mg at 01/09/19 7318  predniSONE (DELTASONE) tablet 60 mg  60 mg Oral DAILY WITH BREAKFAST Belkis Jean MD   60 mg at 01/09/19 1565  furosemide (LASIX) tablet 40 mg  40 mg Oral DAILY July Leigh MD   40 mg at 01/09/19 7766  isosorbide dinitrate (ISORDIL) tablet 5 mg  5 mg Oral BID Chace BRAVO MD   5 mg at 01/09/19 0900  
 amiodarone (CORDARONE) tablet 400 mg  400 mg Oral DAILY Ingrid Cool MD   400 mg at 01/09/19 6899  levoFLOXacin (LEVAQUIN) 500 mg in D5W IVPB  500 mg IntraVENous Q48H Jadon Loo  mL/hr at 01/09/19 0011 500 mg at 01/09/19 0011  piperacillin-tazobactam (ZOSYN) 3.375 g in 0.9% sodium chloride (MBP/ADV) 100 mL MBP  #EXTENDED 4-HOUR INFUSION##  3.375 g IntraVENous Q12H Jadon Loo MD 25 mL/hr at 01/09/19 0618 3.375 g at 01/09/19 2490  WARFARIN INFORMATION NOTE (COUMADIN)   Other PRN Jadon Loo MD      
 trimethoprim-sulfamethoxazole (BACTRIM, SEPTRA)  mg per tablet 1 Tab  1 Tab Oral Q MON, WED & Rekha Pennington MD   1 Tab at 01/07/19 1902  metoprolol tartrate (LOPRESSOR) tablet 25 mg  25 mg Oral Q12H Ingrid Cool MD   25 mg at 01/09/19 7137  sodium chloride (NS) flush 5-10 mL  5-10 mL IntraVENous PRN Diogenes Ordonez MD      
 VANCOMYCIN INFORMATION NOTE   Other Rx Dosing/Monitoring Diogenes Ordonez MD      
 atorvastatin (LIPITOR) tablet 40 mg  40 mg Oral DAILY Kendell Lovell MD   40 mg at 01/09/19 5739  docusate sodium (COLACE) capsule 100 mg  100 mg Oral BID Kendell Lovell MD   100 mg at 01/09/19 0900  ferrous sulfate tablet 325 mg  325 mg Oral ACB Kendell Lovell MD   325 mg at 01/09/19 0900  multivitamin, tx-iron-ca-min (THERA-M w/ IRON) tablet 1 Tab  1 Tab Oral DAILY Kendell Lovell MD   1 Tab at 01/09/19 0900  pantoprazole (PROTONIX) tablet 20 mg  20 mg Oral DAILY Kendell Lovell MD   20 mg at 01/09/19 9720  tamsulosin (FLOMAX) capsule 0.4 mg  0.4 mg Oral DAILY Kendell Lovell MD   0.4 mg at 01/09/19 3077  acetaminophen (TYLENOL) tablet 650 mg  650 mg Oral Q4H PRN Kendell Lovell MD   650 mg at 01/07/19 0450  ondansetron (ZOFRAN) injection 4 mg  4 mg IntraVENous Q4H PRN Kendell Lovell MD      
 albuterol-ipratropium (DUO-NEB) 2.5 MG-0.5 MG/3 ML  3 mL Nebulization Q4H PRN Blanquita Martinez NP   3 mL at 01/03/19 1146 Objective Vitals:  
 01/08/19 2000 01/09/19 0003 01/09/19 0424 01/09/19 2521 BP: 106/55 102/56 100/54 95/55 Pulse: 76 66 67 70 Resp: 20 18 18 18 Temp: 98.5 °F (36.9 °C) 97.8 °F (36.6 °C) 97.7 °F (36.5 °C) 97.2 °F (36.2 °C) SpO2: 92% 90% 93% 90% Weight:      
Height:      
 
 
 
Intake/Output Summary (Last 24 hours) at 1/9/2019 1457 Last data filed at 1/9/2019 1407 Gross per 24 hour Intake 790 ml Output 100 ml Net 690 ml Admission weight: Weight: 91.2 kg (201 lb) (01/02/19 2318) Last Weight Metrics: 
Weight Loss Metrics 1/8/2019 9/9/2018 Today's Wt 202 lb 208 lb BMI 32.6 kg/m2 33.57 kg/m2 Physical Assessment:  
 
General: NAD, alert and oriented. Neck: No jvd. LUNGS: Clear to Auscultation, No rales, rhonchi or wheezes. CVS EXM: S1, S2  RRR, no murmurs/gallops/rubs. Abdomen: soft, non tender. Mild tenderness to palpation of the lt flank, lt hip. Rt inner thigh with large hematoma, non tender. Lower Extremities:  trace edema. Lab CBC w/Diff Recent Labs 01/08/19 
0455 01/07/19 
6287 WBC 13.1 12.6 RBC 2.86* 3.45* HGB 7.9* 9.6* HCT 25.7* 31.3*  
 167 Chemistry Recent Labs 01/09/19 
9014 01/08/19 
2000 01/08/19 
0455 01/07/19 
5715 GLU 99 148* 102* 98  141 144 145  
K 3.9 4.2 4.1 4.0  
 102 106 106 CO2 32 29 29 30 BUN 66* 61* 55* 57* CREA 4.10* 3.98* 3.54* 3.56* CA 8.0* 7.8* 7.8* 7.8* AGAP 6 10 9 9 BUCR 16 15 16 16 ALB  --   --  2.3* 2.6* PHOS  --   --  6.1* 6.1* No results found for: IRON, FE, TIBC, IBCT, PSAT, FERR Lab Results Component Value Date/Time Calcium 8.0 (L) 01/09/2019 05:28 AM  
 Phosphorus 6.1 (H) 01/08/2019 04:55 AM  
  
 
Tung Davis M.D. [facility] Phone [phone] Pager [phone]

## 2019-01-09 NOTE — PROGRESS NOTES
1105: PT treatment session held per Dr. Barnabas Boast. Will f/u with patient on 1/10. Steven Aquino PT, DPT Office extension: [phone] Pager #: [phone]

## 2019-01-09 NOTE — PROGRESS NOTES
[facility] Daily Progress Note Daily progress Note Patient: Colby Palmer MRN: [id number]  CSN: [id number] YOB: 1932  Age: 80 y.o. 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 Oxygen sats 88% on RA and was placed on oxygen with some relief. 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 Pt has been on high dose oral Prednisone and so far has received two doses of Rituximab (last dose on 1/12019). CT chest note pulmonary lesions which are felt to be due to vasculitis vs mets. Work up in the ED - CXR findings suspicious for pulmonary edema superimposed on chronic interstitial lung disease, suspected additional bibasilar superimposed alveolar component although pneumonia is difficult to exclude, probable small pleural effusions. Troponin 0.09 - -> 0.11 - -> 0.11, creatinine 2.48 and BNP 26162 in ED. Pt started on IV diuretics this admission and admitted for further management. Echo obtained 1/3 showed EF < 15%, LV hypertrophy, LV global hypokinesis, small echogenic and calcified thrombus LV, porcine bioprosthetic aortic valve, trace mitral valve regurgitation. Cardiology consulted. Pt started on Heparin gtt and oral Coumadin.   Creatinine increased to 3 on 1/4, nephrology consulted given recent biopsy findings and increasing creatinine given diuresis. Nephrology recommends continue oral prednisone, pt's next dose of Rituximab was supposed to be on 1/8 but will hold until pneumonia is treated. Pt started on Bactrim for PCP prophylaxis. Ok to resume Lasix per nephrology. Lasix restarted. Patient was ok to go home 1/5 but required 02 and delayed dc in addition life vest was ordered but patient refused. Heparin gtt dc'd on 1/7 as INR 2.3- as recommended per cardiology notes. Acute weakness this am, pt unable to sit up in bed and PT unable to work with pt- he was ambulatory over weekend. Pt c/o left lateral thigh pain and numbness inner right thigh. CT abd/pelvis w/o contrast showed lobulated intramuscular hypodense lesion involving RLE muscles greatest in pectineus muscle, right pleural effusion w/ multiple pleural based nodules, abnormal linear hypodensity within anteroinferior aspect of L4 vertebral body suggesting compression fracture w/ possible localized osteonecrotic cavity. Findings discussed with pt and daughter- plan for MRI L spine and right thigh- pending. Pt has agreed to SNF-  following and matching to facilities. Pt will not be discharged. Official read from MRI still pending however radiologist left note in chart from MRI L spine note acute fracture inferior endplate L4. MRI right femur 10 cm hemorrhagic fluid collection, right adductor longus muscle w/ moderate surrounding perilesional intramuscular and intermuscular medial right thigh hemorrhage or edema. Ortho consulted- no clinical evidence of compartment syndrome, symptoms more related to L4 compression fracture- continue prednisone and supplement with lumbar corset- f/u in office with Dr. Bekah Heath- consider epidural steroid injections. INR up to 3.7 today- Coumadin dc'd- discussed with cardiology- risks outweighs benefit at this time, LV thrombus calcified on Echo, thus Coumadin will not be continued- pt to start on ASA/Plavix once INR < 2. Creatinine up to 4. Plan for 3rd infusion of [facility]ituximab tomorrow[facility] pt for Lumbar corset. Planning for d/c to [facility] tomorrow if remains clinically stable. Assessment/Plan:  
 
Patient Active Problem List  
Diagnosis Code  Acute exacerbation of CHF (congestive heart failure) ([facility]) I50.9  HCAP (healthcare-associated pneumonia) J18.9  LV (left ventricular) mural thrombus I51.3  Cardiomyopathy ([address] I42.9  Acute renal failure superimposed on stage 3 chronic kidney disease ([facility]) N17.9, N18.3  Aortic stenosis I35.0  Hypertension I10  
 ANCA-associated vasculitis ([facility]) I77.6  NSVT (nonsustained ventricular tachycardia) ([facility]) I47.2 A/P: 
Acute Exacerbation of CHF /Cardiomyopathy 
- cardiology following- BNP 97066 on admission - Lasix daily (oral), monitor I/O 
- Echo 1/3 with EF < 15%, LV hypertrophy, LV global hypokinesis, small echogenic and calcified thrombus LV, porcine bioprosthetic aortic valve, trace mitral valve regurgitation 
- resume home meds - Patient refuses life vest 
 
Acute fracture inferior end plate L4 
- pt with acute weakness, inability to walk or sit up in bed despite assistance with PT 
- denies falls, bowel/bladder incontinence - MRI L spine showed acute fracture inferior endplate L4 
- ortho consulted- continue prednisone and supplement with lumbar corset 
- f/u outpatient with Dr. Nataliia Mcginnis, consider epidural steroid injections HCAP 
- continue IV Abx 
- started on Bactrim per nephrology for PCP prophylaxis 
- supplemental oxygen, duo-nebs, prednisone daily LV Thrombus 
- noted on Echo this admission 
- Heparin gtt- dc'd 1/7 
- Coumadin dc'd 1/9 due to supratherapeutic INR- Vitamin K 10 mg x 1 today 
- discussed with cardiology- risks outweigh benefit, Coumadin will not be continued at this time since LV thrombus appeared calcified on Echo 
- plan to start ASA/Plavix once INR < 2 
 
NSVT - cardiology following 
- was on Amiodarone IV, now on 400 mg oral 
 
Hx of aortic stenosis and aortic valve replacement 
- started on Coumadin 1/3 
- INR up to 3.7 today, Coumadin dc'd- risks outweigh benefit, thus Coumadin will not be continued 
- cardiology following Acute on chronic renal failure superimposed on CKD 3 
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 
- continue Prednisone 60 mg daily - pt received two doses of of Rituximab (last dose on 1/12019), next dose tomorrow 1/10   
- continue diuretics per nephrology- await recommendations considering slight increase in creatinine 
- monitor I/O's, BMP Pulmonary Nodules 
-CT chest done 12/24/18 with multiple pulmonary and pleural based nodules suspicious for neoplasm 
- was recommended to pursue PET scan at d/c Hypertension 
- continue home meds DVT Prophylaxis 
- supratherapeutic INR, Coumadin dc'd Victorina Pierre, FNP-C [facility] Pager:  [phone] Office:  [phone] Subjective:  
  
Daughter at bedside. Pt with bruising to right inner thigh. No active bleeding. To receive Rituximab infusion tomorrow. Should be fitted for lumbar corset today. Objective:  
  
Visit Vitals /54 Pulse 67 Temp 97.7 °F (36.5 °C) Resp 18 Ht 5' 6\" (1.676 m) Wt 91.6 kg (202 lb) SpO2 93% BMI 32.60 kg/m² Physical Exam: 
General appearance: alert, cooperative, no distress, appears stated age Head: Normocephalic, without obvious abnormality, atraumatic Lungs: clear to auscultation bilaterally Heart: regular rate and rhythm, S1, S2 normal, no murmur, click, rub or gallop Abdomen: soft, non tender, non distended. Normoactive bowel sounds. Extremities: extremities normal, atraumatic, no cyanosis or edema Skin: scattered bruising BUE, erythema/bruising noted to right inner thigh Neurologic: generalized weakness- MORALES 
PSY: Mood and affect normal, appropriately behaved Intake and Output: 
Current Shift:  No intake/output data recorded. Last three shifts:  01/07 1901 - 01/09 0700 In: 1230 [P.O.:480; I.V.:750] Out: 350 [Urine:350] Recent Results (from the past 24 hour(s)) [facility] Collection Time: 01/08/19  8:00 PM  
Result Value Ref Range Vancomycin, random 23.8 5.0 - 30.3 UG/ML  
METABOLIC PANEL, BASIC Collection Time: 01/08/19  8:00 PM  
Result Value Ref Range Sodium 141 136 - 145 mmol/L Potassium 4.2 3.5 - 5.5 mmol/L Chloride 102 100 - 108 mmol/L  
 CO2 29 21 - 32 mmol/L Anion gap 10 3.0 - 18 mmol/L Glucose 148 (H) 74 - 99 mg/dL BUN 61 (H) 7.0 - 18 MG/DL Creatinine 3.98 (H) 0.6 - 1.3 MG/DL  
 BUN/Creatinine ratio 15 12 - 20 GFR est AA 17 (L) >60 ml/min/1.73m2 GFR est non-AA 14 (L) >60 ml/min/1.73m2 Calcium 7.8 (L) 8.5 - 10.1 MG/DL PROTHROMBIN TIME + INR Collection Time: 01/09/19  5:28 AM  
Result Value Ref Range Prothrombin time 37.0 (H) 11.5 - 15.2 sec INR 3.7 (H) 0.8 - 1.2 METABOLIC PANEL, BASIC Collection Time: 01/09/19  5:28 AM  
Result Value Ref Range Sodium 142 136 - 145 mmol/L Potassium 3.9 3.5 - 5.5 mmol/L Chloride 104 100 - 108 mmol/L  
 CO2 32 21 - 32 mmol/L Anion gap 6 3.0 - 18 mmol/L Glucose 99 74 - 99 mg/dL BUN 66 (H) 7.0 - 18 MG/DL Creatinine 4.10 (H) 0.6 - 1.3 MG/DL  
 BUN/Creatinine ratio 16 12 - 20 GFR est AA 17 (L) >60 ml/min/1.73m2 GFR est non-AA 14 (L) >60 ml/min/1.73m2 Calcium 8.0 (L) 8.5 - 10.1 MG/DL Lab Results Component Value Date/Time Glucose 99 01/09/2019 05:28 AM  
 Glucose 148 (H) 01/08/2019 08:00 PM  
 Glucose 102 (H) 01/08/2019 04:55 AM  
 Glucose 98 01/07/2019 06:01 AM  
 Glucose 124 (H) 01/06/2019 04:05 AM  
  
 
Imaging: No results found. Medication List Reviewed: 
Current Facility-Administered Medications Medication Dose Route Frequency  [START ON 1/10/2019] riTUXimab (RITUXAN) 500 mg in 0.9% sodium chloride 500 mL infusion  500 mg IntraVENous ONCE TITR  vancomycin (VANCOCIN) 750 mg in 0.9% sodium chloride (MBP/ADV) 250 mL ADV  750 mg IntraVENous Q24H  
 [START ON 1/10/2019] VANCOMYCIN INFORMATION NOTE   Other ONCE  
 traMADol (ULTRAM) tablet 50 mg  50 mg Oral Q6H PRN  predniSONE (DELTASONE) tablet 60 mg  60 mg Oral DAILY WITH BREAKFAST  furosemide (LASIX) tablet 40 mg  40 mg Oral DAILY  isosorbide dinitrate (ISORDIL) tablet 5 mg  5 mg Oral BID  
 amiodarone (CORDARONE) tablet 400 mg  400 mg Oral DAILY  levoFLOXacin (LEVAQUIN) 500 mg in D5W IVPB  500 mg IntraVENous Q48H  piperacillin-tazobactam (ZOSYN) 3.375 g in 0.9% sodium chloride (MBP/ADV) 100 mL MBP  #EXTENDED 4-HOUR INFUSION##  3.375 g IntraVENous Q12H  WARFARIN INFORMATION NOTE (COUMADIN)   Other PRN  
 trimethoprim-sulfamethoxazole (BACTRIM, SEPTRA)  mg per tablet 1 Tab  1 Tab Oral Q MON, WED & FRI  metoprolol tartrate (LOPRESSOR) tablet 25 mg  25 mg Oral Q12H  
 sodium chloride (NS) flush 5-10 mL  5-10 mL IntraVENous PRN  
 VANCOMYCIN INFORMATION NOTE   Other Rx Dosing/Monitoring  atorvastatin (LIPITOR) tablet 40 mg  40 mg Oral DAILY  docusate sodium (COLACE) capsule 100 mg  100 mg Oral BID  ferrous sulfate tablet 325 mg  325 mg Oral ACB  multivitamin, tx-iron-ca-min (THERA-M w/ IRON) tablet 1 Tab  1 Tab Oral DAILY  pantoprazole (PROTONIX) tablet 20 mg  20 mg Oral DAILY  tamsulosin (FLOMAX) capsule 0.4 mg  0.4 mg Oral DAILY  acetaminophen (TYLENOL) tablet 650 mg  650 mg Oral Q4H PRN  
 ondansetron (ZOFRAN) injection 4 mg  4 mg IntraVENous Q4H PRN  
 albuterol-ipratropium (DUO-NEB) 2.5 MG-0.5 MG/3 ML  3 mL Nebulization Q4H PRN

## 2019-01-09 NOTE — PROGRESS NOTES
Problem: Self Care Deficits Care Plan (Adult) Goal: *Acute Goals and Plan of Care (Insert Text) Occupational Therapy Goals Initiated 1/8/2019 within 7 day(s). 1.  Patient will perform grooming tasks while standing with modified independence. 2.  Patient will perform lower body dressing with modified independence and good safety awareness. 3.  Patient will perform functional task in standing for 8 minutes with modified independence and fair+ dynamic standing balance in prep for ADLs. 4.  Patient will perform toilet transfers with modified independence. 5.  Patient will perform all aspects of toileting with modified independence. 6.  Patient will participate in upper extremity therapeutic exercise/activities with modified independence for 8 minutes to maintain BUE strength for functional transfers and ADLs. 7.  Patient will utilize energy conservation techniques during functional activities with minimal verbal cues. Outcome: Progressing Towards Goal 
Occupational Therapy TREATMENT Patient: Anabel Steinberg (89 y.o. male) Date: 1/9/2019 Diagnosis: HCAP (healthcare-associated pneumonia) Acute exacerbation of CHF (congestive heart failure) ([facility]) Acute exacerbation of CHF (congestive heart failure) ([facility] [address] Precautions: (None) Chart, occupational therapy assessment, plan of care, and goals were reviewed. PLOF: Independent ASSESSMENT: 
Pt seated EOB upon entry, with supportive spouse present. Pt tolerates standing sinkside performing ADL grooming tasks and toileting ADL w/SBA. Pt requires vc's for safety w/functional mobility and O2 tubing mgt. Pt requires rest break s/p standing activity. Increase time w/UB dressing task, donning/doffing hospital gown. SpO2% 94 at rest on 3 L O2 nc, 89 s/p standing activity recovering to 98 within ~ 15 seconds of deep breathing. EDUCATION Pt educated on safety w/RW and functional mobility w/O2 tubing mgt and importance of pacing activity to minimize SOB. Progression toward goals: 
[x]          Improving appropriately and progressing toward goals 
[]          Improving slowly and progressing toward goals 
[]          Not making progress toward goals and plan of care will be adjusted PLAN: 
Patient continues to benefit from skilled intervention to address the above impairments. Continue treatment per established plan of care. Discharge Recommendations:  Joselo Shipman Further Equipment Recommendations for Discharge:  shower chair SUBJECTIVE:  
Patient stated I'm telling you it's going to be 94.  reference SpO2% OBJECTIVE DATA SUMMARY: 
  
Cognitive/Behavioral Status: 
Neurologic State: Alert Orientation Level: Oriented X4 Cognition: Impulsive Safety/Judgement: Decreased insight into deficits Functional Mobility and Transfers for ADLs: 
 Transfers: 
Sit to Stand: Modified independent; Additional time Bathroom Mobility: Stand-by assistance(w/RW) Balance: 
Sitting: Intact Standing: Intact; With support ADL Intervention: 
Grooming Washing Hands: Stand-by assistance(standing sinkside) Brushing Teeth: Stand-by assistance(standing sinkside) Upper Body Dressing Assistance Hospital Gown: Stand-by assistance Toileting Toileting Assistance: Stand-by assistance(standing to void) Clothing Management: Stand-by assistance Pain: 
Pre Treatment:4 Post Treatment:4 Pain Scale 1: Numeric (0 - 10) Pain Intensity 1: 4 Pain Location 1: Back;Leg 
Pain Orientation 1: Left Pain Description 1: Intermittent; Aching Pain Intervention(s) 1: Medication (see MAR); Position Activity Tolerance:   
Good Please refer to the flowsheet for vital signs taken during this treatment. After treatment:  
[]  Patient left in no apparent distress sitting up in chair 
[x]  Patient left in no apparent distress seated EOB [x]  Call bell left within reach 
[]  Nursing notified 
[x]  spouse present 
[]  Bed alarm activated Viktoriya Ramirez Time Calculation: 30 mins

## 2019-01-09 NOTE — ROUTINE PROCESS
1920  Bedside and Verbal shift change report given to Keiko Goode (oncoming nurse) by Alden Cabrera (offgoing nurse). Report included the following information SBAR, Kardex, Intake/Output and MAR. Pt awake and alert, sitting on side of bed. No complaints at this time. 0720  Bedside and Verbal shift change report given to [facility] (oncoming nurse) by Keiko Goode (offgoing nurse). Report included the following information SBAR, Kardex, Intake/Output and MAR.

## 2019-01-10 NOTE — PROGRESS NOTES
completed follow up visit with and patient in room 3009 today and a Spiritual assessment of patient was done. Chaplains will continue to follow and will provide pastoral care on an as needed/requested basis Desi 3 Board Certified [employer] Spiritual Care  
[phone]

## 2019-01-10 NOTE — PROGRESS NOTES
Nutrition initial assessment/Plan of care RECOMMENDATIONS:  
 
1. Cardiac diet 2. Monitor weight, labs and PO intake 3. RD to follow GOALS:  
 
1. PO intake meets >75% of protein/calorie needs by 1/17 
2. Weight Maintenance (+/- 1-2 lb by 1/17) ASSESSMENT:  
 
Weight status is classified as obese per BMI of 34.1. PO intake is adequate. Labs noted. Patient with hypoalbuminemia and hypocalcemia. Elevated BUN/Creatinine and phosphorus levels; GFR: 12. Nutrition recommendations listed. RD to follow. Nutrition Diagnoses:  
Obesity related to excessive energy intake as evidenced by BMI of 34.1. Nutrition Risk:  [] High  [] Moderate [x]  Low SUBJECTIVE/OBJECTIVE:  
 
LOS. Admitted with CHF. Has history of A-fib, CKD, diabetes, HTN and hyperlipidemia. Reports having a good appetite and eating most of meals. Observed 50% intake of lunch meal today. Patient reported he didn't care for sandwich today and usually eats all of meals. Denies food allergies and problems with chewing/swallowing. States UBW between 215-220 lb and follows low sodium diet at home. Reported being familiar with cardiac diet and didn't have any questions. Will monitor. Information Obtained from:  
 [x] Chart Review [x] Patient 
 [] Family/Caregiver 
 [] Nurse/Physician 
 [] Interdisciplinary Meeting/Rounds Diet: Cardiac diet Medications: [x] Reviewed (Lipitor, Colace, ferrous sulfate, MVI w/ iron, protonix, Prednisone) Allergies: [x] Reviewed Encounter Diagnoses ICD-10-CM ICD-9-CM 1. Acute congestive heart failure, unspecified heart failure type (HCC) I50.9 428.0 2. HCAP (healthcare-associated pneumonia) J18.9 486  
3. Hypoxia R09.02 799.02  
4. Acute respiratory disease J06.9 465.9 5. Chronic kidney disease, unspecified CKD stage N18.9 585.9 6. Pain R52 780.96 Past Medical History:  
Diagnosis Date  A-fib ([facility]  ANCA-associated vasculitis ([facility]  BPH (benign prostatic hyperplasia)  Cardiomyopathy ([facility] LVEF 15% (12/18)  CKD (chronic kidney disease) stage 3, GFR 30-59 ml/min ([facility])  Crescentic glomerulonephritis   
 biopsy 12/31/2018  DM (diabetes mellitus) ([facility]  Hypercholesteremia  Hypertension  PAD (peripheral artery disease) ([facility]  PFO (patent foramen ovale)   
 closure of a patent foramen ovale by Dr. Arin Ervin 4/17/09.  Pulmonary fibrosis ([facility]  S/P AVR (aortic valve replacement) 2009  
  23 mm Reggie-Diaz porcine valve Labs:   
Lab Results Component Value Date/Time Sodium 140 01/10/2019 05:11 AM  
 Potassium 3.9 01/10/2019 05:11 AM  
 Chloride 102 01/10/2019 05:11 AM  
 CO2 30 01/10/2019 05:11 AM  
 Anion gap 8 01/10/2019 05:11 AM  
 Glucose 95 01/10/2019 05:11 AM  
 BUN 79 (H) 01/10/2019 05:11 AM  
 Creatinine 4.55 (H) 01/10/2019 05:11 AM  
 Calcium 8.0 (L) 01/10/2019 05:11 AM  
 Magnesium 2.2 01/04/2019 06:44 AM  
 Phosphorus 5.9 (H) 01/10/2019 05:11 AM  
 Albumin 2.6 (L) 01/10/2019 05:11 AM  
 
Anthropometrics: BMI (calculated): 34.1 Last 3 Recorded Weights in this Encounter 01/05/19 1836 01/08/19 1146 01/10/19 0517 Weight: 89.8 kg (198 lb) 91.6 kg (202 lb) 95.8 kg (211 lb 3.2 oz) Ht Readings from Last 1 Encounters:  
01/03/19 5' 6\" (1.676 m) Weight Metrics 1/10/2019 9/9/2018 Weight 211 lb 3.2 oz 208 lb BMI 34.09 kg/m2 33.57 kg/m2 Patient Vitals for the past 100 hrs: 
 % Diet Eaten 01/10/19 0800 100 % 01/09/19 1808 100 % 01/09/19 1403 100 % 01/09/19 0956 100 % 01/08/19 1230 50 % 01/08/19 0830 10 % 01/07/19 1329 25 % 01/07/19 0857 0 % IBW: 142 lb %IBW: 148% UBW: 215-220 lb lb %UBW: 98%   
 
[] Weight Loss [] Weight Gain [x] Weight Stable Estimated Nutrition Needs: [x] MSJ Calories: 2055 Kcal Based on:   [x] Actual BW   
Protein:   96 g Based on:   [x] Actual BW Fluid:       2400 ml Based on:   [x] Actual BW  
 
 [x] No Cultural, Jain or ethnic dietary need identified. [] Cultural, Bahai and ethnic food preferences identified and addressed Wt Status:  [] Normal (18.6 - 24.9) [] Underweight (<18.5) [] Overweight (25 - 29.9) [x] Mild Obesity (30 - 34.9)  [] Moderate Obesity (35 - 39.9) [] Morbid Obesity (40+) Nutrition Problems Identified:  
[] Suboptimal PO intake  
[] Food Allergies [] Difficulty chewing/swallowing/poor dentition 
[] Constipation/Diarrhea (Last BM: 1/8-formed) [] Nausea/Vomiting  
[x] None 
[] Other:  
 
Plan:  
[x] Therapeutic Diet 
[]  Obtained/adjusted food preferences/tolerances and/or snacks options  
[]  Supplements added  
[] Occupational therapy following for feeding techniques []  HS snack added  
[]  Modify diet texture  
[]  Modify diet for food allergies []  Assist with menu selection  
[x]  Monitor PO intake on meal rounds  
[x]  Continue inpatient monitoring and intervention  
[]  Participated in discharge planning/Interdisciplinary rounds/Team meetings  
[]  Other:  
 
Education Needs: 
 [] Not appropriate for teaching at this time due to: 
 [x] Identified and addressed Nutrition Monitoring and Evaluation: 
[x] Continue ongoing monitoring and intervention 
[] Slade Aiken 29

## 2019-01-10 NOTE — PROGRESS NOTES
Problem: Falls - Risk of 
Goal: *Absence of Falls Document Bessy Ying Fall Risk and appropriate interventions in the flowsheet. Outcome: Progressing Towards Goal 
Fall Risk Interventions: 
Mobility Interventions: Communicate number of staff needed for ambulation/transfer, Patient to call before getting OOB, Utilize walker, cane, or other assistive device Medication Interventions: Evaluate medications/consider consulting pharmacy, Patient to call before getting OOB, Teach patient to arise slowly Elimination Interventions: Call light in reach, Patient to call for help with toileting needs, Urinal in reach History of Falls Interventions: Bed/chair exit alarm, Door open when patient unattended, Evaluate medications/consider consulting pharmacy, Investigate reason for fall, Room close to nurse's station

## 2019-01-10 NOTE — PROGRESS NOTES
[facility] Daily Progress Note Daily progress Note Patient: Anat Hamm MRN: [id number]  [facility]: [id number] YOB: 1932  Age: 80 y.o. 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 Oxygen sats 88% on RA and was placed on oxygen with some relief. 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 Pt has been on high dose oral Prednisone and so far has received two doses of Rituximab (last dose on 1/12019). CT chest note pulmonary lesions which are felt to be due to vasculitis vs mets. Work up in the ED - CXR findings suspicious for pulmonary edema superimposed on chronic interstitial lung disease, suspected additional bibasilar superimposed alveolar component although pneumonia is difficult to exclude, probable small pleural effusions. Troponin 0.09 - -> 0.11 - -> 0.11, creatinine 2.48 and BNP 90252 in ED. Pt started on IV diuretics this admission and admitted for further management. Echo obtained 1/3 showed EF < 15%, LV hypertrophy, LV global hypokinesis, small echogenic and calcified thrombus LV, porcine bioprosthetic aortic valve, trace mitral valve regurgitation. Cardiology consulted. Pt started on Heparin gtt and oral Coumadin.   Creatinine increased to 3 on 1/4, nephrology consulted given recent biopsy findings and increasing creatinine given diuresis. Nephrology recommends continue oral prednisone, pt's next dose of Rituximab was supposed to be on 1/8 but will hold until pneumonia is treated. Pt started on Bactrim for PCP prophylaxis. Ok to resume Lasix per nephrology. Lasix restarted. Patient was ok to go home 1/5 but required 02 and delayed dc in addition life vest was ordered but patient refused. Heparin gtt dc'd on 1/7 as INR 2.3- as recommended per cardiology notes. Acute weakness this am, pt unable to sit up in bed and PT unable to work with pt- he was ambulatory over weekend. Pt c/o left lateral thigh pain and numbness inner right thigh. CT abd/pelvis w/o contrast showed lobulated intramuscular hypodense lesion involving RLE muscles greatest in pectineus muscle, right pleural effusion w/ multiple pleural based nodules, abnormal linear hypodensity within anteroinferior aspect of L4 vertebral body suggesting compression fracture w/ possible localized osteonecrotic cavity. Findings discussed with pt and daughter- plan for MRI L spine and right thigh- pending. Pt has agreed to SNF-  following and matching to facilities. Pt will not be discharged. Official read from MRI still pending however radiologist left note in chart from MRI L spine note acute fracture inferior endplate L4. MRI right femur 10 cm hemorrhagic fluid collection, right adductor longus muscle w/ moderate surrounding perilesional intramuscular and intermuscular medial right thigh hemorrhage or edema. Ortho consulted- no clinical evidence of compartment syndrome, symptoms more related to L4 compression fracture- continue prednisone and supplement with lumbar corset- f/u in office with Dr. Shirley Hart- consider epidural steroid injections. INR up to 3.7 today- Coumadin dc'd- discussed with cardiology- risks outweighs benefit at this time, LV thrombus calcified on Echo, thus Coumadin will not be continued- pt to start on ASA/Plavix once INR < 2. Creatinine up to 4. Plan for 3rd infusion [facility] Lumbar [facility]. Planning for d/c to [facility] when medically stable. INR down to 1.3 on 1/10- plan to start ASA and Plavix on 1/11. Creatinine continues to increase- defer to nephrology for management. Assessment/Plan:  
 
Patient Active Problem List  
Diagnosis Code  Acute exacerbation of CHF (congestive heart failure) ([facility]) I50.9  HCAP (healthcare-associated pneumonia) J18.9  LV (left ventricular) mural thrombus I51.3  Cardiomyopathy ([facility] I42.9  Acute renal failure superimposed on stage 3 chronic kidney disease ([facility]) N17.9, N18.3  Aortic stenosis I35.0  Hypertension I10  
 ANCA-associated vasculitis ([facility]) I77.6  NSVT (nonsustained ventricular tachycardia) ([facility]) I47.2 A/P: 
Acute Exacerbation of CHF /Cardiomyopathy 
- cardiology following- BNP 13172 on admission - Lasix daily (oral), monitor I/O 
- Echo 1/3 with EF < 15%, LV hypertrophy, LV global hypokinesis, small echogenic and calcified thrombus LV, porcine bioprosthetic aortic valve, trace mitral valve regurgitation 
- resume home meds - Patient refuses life vest 
 
Acute fracture inferior end plate L4 
- pt with acute weakness, inability to walk or sit up in bed despite assistance with PT 
- denies falls, bowel/bladder incontinence - MRI L spine showed acute fracture inferior endplate L4 
- ortho consulted- continue prednisone and supplement with lumbar corset 
- f/u outpatient with Dr. Sarah Baldwin, consider epidural steroid injections HCAP 
- completed IV abx 
- started on Bactrim per nephrology for PCP prophylaxis 
- supplemental oxygen, duo-nebs, prednisone daily LV Thrombus 
- noted on Echo this admission 
- Heparin gtt- dc'd 1/7 
- Coumadin dc'd 1/9 due to supratherapeutic INR- Vitamin K 10 mg x 1 today 
- discussed with cardiology- risks outweigh benefit, Coumadin will not be continued at this time since LV thrombus appeared calcified on Echo 
- plan to start ASA/Plavix tomorrow 1/11 NSVT 
- cardiology following 
- was on Amiodarone IV, now on 400 mg oral 
 
Hx of aortic stenosis and aortic valve replacement 
- started on Coumadin 1/3 
- INR up to 3.7 today, Coumadin dc'd- risks outweigh benefit, thus Coumadin will not be continued 
- cardiology following Acute on chronic renal failure superimposed on CKD 3 
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 
- continue Prednisone 60 mg daily - pt received two doses of of Rituximab (last dose on 1/12019), next dose tomorrow 1/11  
- monitor I/O's, BMP Pulmonary Nodules 
-CT chest done 12/24/18 with multiple pulmonary and pleural based nodules suspicious for neoplasm 
- was recommended to pursue PET scan at d/c Hypertension 
- continue home meds DVT Prophylaxis 
- supratherapeutic INR, Coumadin dc'd [facility] [address] [place], [place] [address]ty Group Hospitalist Division Pager:  [phone] Office:  [phone] Subjective:  
  
Pt feels ok today but concerned about his kidney function. Objective:  
  
Visit Vitals BP 99/64 Pulse 73 Temp 97.8 °F (36.6 °C) Resp 16 Ht 5' 6\" (1.676 m) Wt 95.8 kg (211 lb 3.2 oz) SpO2 95% BMI 34.09 kg/m² Physical Exam: 
General appearance: alert, cooperative, no distress, appears stated age Head: Normocephalic, without obvious abnormality, atraumatic Lungs: clear to auscultation bilaterally Heart: regular rate and rhythm, S1, S2 normal, no murmur, click, rub or gallop Abdomen: soft, non tender, non distended. Normoactive bowel sounds. Extremities: extremities normal, atraumatic, no cyanosis or edema Skin: scattered bruising BUE, erythema/bruising noted to right inner thigh down to knee, also bruising noted to posterior right thigh Neurologic: generalized weakness- MORALES 
 PSY: Mood and affect normal, appropriately behaved Intake and Output: 
Current Shift:  No intake/output data recorded. Last three shifts:  01/08 1901 - 01/10 0700 In: 1706.5 [P.O.:582; I.V.:1124.5] Out: 0721 [[id number]] Recent Results (from the past 24 hour(s)) PROTHROMBIN TIME + INR Collection Time: 01/10/19  5:11 AM  
Result Value Ref Range Prothrombin time 16.3 (H) 11.5 - 15.2 sec INR 1.3 (H) 0.8 - 1.2 Deborah Kennedy Collection Time: 01/10/19  5:11 AM  
Result Value Ref Range Vancomycin,trough 26.7 (HH) 10.0 - 20.0 ug/mL Reported dose date: [id number] Reported dose time: 0500 Reported dose: 750 MG UNITS  
CBC W/O DIFF Collection Time: 01/10/19  5:11 AM  
Result Value Ref Range WBC 13.3 (H) 4.6 - 13.2 K/uL  
 RBC 2.75 (L) 4.70 - 5.50 M/uL HGB 7.5 (L) 13.0 - 16.0 g/dL HCT 24.2 (L) 36.0 - 48.0 % MCV 88.0 74.0 - 97.0 FL  
 MCH 27.3 24.0 - 34.0 PG  
 MCHC 31.0 31.0 - 37.0 g/dL  
 RDW 14.5 11.6 - 14.5 % PLATELET 546 746 - 807 K/uL MPV 11.6 9.2 - 11.8 FL  
RENAL FUNCTION PANEL Collection Time: 01/10/19  5:11 AM  
Result Value Ref Range Sodium 140 136 - 145 mmol/L Potassium 3.9 3.5 - 5.5 mmol/L Chloride 102 100 - 108 mmol/L  
 CO2 30 21 - 32 mmol/L Anion gap 8 3.0 - 18 mmol/L Glucose 95 74 - 99 mg/dL BUN 79 (H) 7.0 - 18 MG/DL Creatinine 4.55 (H) 0.6 - 1.3 MG/DL  
 BUN/Creatinine ratio 17 12 - 20 GFR est AA 15 (L) >60 ml/min/1.73m2 GFR est non-AA 12 (L) >60 ml/min/1.73m2 Calcium 8.0 (L) 8.5 - 10.1 MG/DL Phosphorus 5.9 (H) 2.5 - 4.9 MG/DL Albumin 2.6 (L) 3.4 - 5.0 g/dL Lab Results Component Value Date/Time Glucose 95 01/10/2019 05:11 AM  
 Glucose 99 01/09/2019 05:28 AM  
 Glucose 148 (H) 01/08/2019 08:00 PM  
 Glucose 102 (H) 01/08/2019 04:55 AM  
 Glucose 98 01/07/2019 06:01 AM  
  
 
Imaging: No results found. Medication List Reviewed: 
Current Facility-Administered Medications Medication Dose Route Frequency  riTUXimab (RITUXAN) 500 mg in 0.9% sodium chloride 500 mL infusion  500 mg IntraVENous ONCE TITR  traMADol (ULTRAM) tablet 50 mg  50 mg Oral Q6H PRN  predniSONE (DELTASONE) tablet 60 mg  60 mg Oral DAILY WITH BREAKFAST  isosorbide dinitrate (ISORDIL) tablet 5 mg  5 mg Oral BID  
 amiodarone (CORDARONE) tablet 400 mg  400 mg Oral DAILY  WARFARIN INFORMATION NOTE (COUMADIN)   Other PRN  
 trimethoprim-sulfamethoxazole (BACTRIM, SEPTRA)  mg per tablet 1 Tab  1 Tab Oral Q MON, WED & FRI  metoprolol tartrate (LOPRESSOR) tablet 25 mg  25 mg Oral Q12H  
 sodium chloride (NS) flush 5-10 mL  5-10 mL IntraVENous PRN  
 atorvastatin (LIPITOR) tablet 40 mg  40 mg Oral DAILY  docusate sodium (COLACE) capsule 100 mg  100 mg Oral BID  ferrous sulfate tablet 325 mg  325 mg Oral ACB  multivitamin, tx-iron-ca-min (THERA-M w/ IRON) tablet 1 Tab  1 Tab Oral DAILY  pantoprazole (PROTONIX) tablet 20 mg  20 mg Oral DAILY  tamsulosin (FLOMAX) capsule 0.4 mg  0.4 mg Oral DAILY  acetaminophen (TYLENOL) tablet 650 mg  650 mg Oral Q4H PRN  
 ondansetron (ZOFRAN) injection 4 mg  4 mg IntraVENous Q4H PRN  
 albuterol-ipratropium (DUO-NEB) 2.5 MG-0.5 MG/3 ML  3 mL Nebulization Q4H PRN

## 2019-01-10 NOTE — PROGRESS NOTES
1900  -- Bedside, Verbal and Written shift change report given to [id number] (oncoming nurse) by [place] nurse). Report included the following information SBAR, Kardex, Intake/Output, MAR and Recent Results.  
   
2106 -- PM medications administered, pt tolerated with ease, will continue to monitor. 
  
0000 -- Shift reassessment, pt condition unchanged, will continue to monitor. 
   
0400 --  Shift reassessment, pt condition unchanged, will continue to monitor.   
 
0515 -- PRN pain medications administered, pt tolerated with ease, will continue to monitor. 8567 -- Critical lab result VANCO 26.7, results covered by order set. 
   
 1742 -- Bedside, Verbal and Written shift change report given to [id number] [place]) by JOSE (offgoing nurse). Report included the following information SBAR, Kardex, Intake/Output, MAR and Recent Results. Skin assessment completed.

## 2019-01-10 NOTE — PROGRESS NOTES
Problem: Self Care Deficits Care Plan (Adult) Goal: *Acute Goals and Plan of Care (Insert Text) Occupational Therapy Goals Initiated 1/8/2019 within 7 day(s). 1.  Patient will perform grooming tasks while standing with modified independence. 2.  Patient will perform lower body dressing with modified independence and good safety awareness. 3.  Patient will perform functional task in standing for 8 minutes with modified independence and fair+ dynamic standing balance in prep for ADLs. 4.  Patient will perform toilet transfers with modified independence. 5.  Patient will perform all aspects of toileting with modified independence. 6.  Patient will participate in upper extremity therapeutic exercise/activities with modified independence for 8 minutes to maintain BUE strength for functional transfers and ADLs. 7.  Patient will utilize energy conservation techniques during functional activities with minimal verbal cues. Outcome: Progressing Towards Goal 
Occupational Therapy TREATMENT Patient: Liliana Baez (30 y.o. male) Date: 1/10/2019 Diagnosis: HCAP (healthcare-associated pneumonia) Acute exacerbation of CHF (congestive heart failure) ([facility]) Acute exacerbation of CHF (congestive heart failure) ([facility] [address] [facility] Precautions: (None) Chart, occupational therapy assessment, plan of care, and goals were reviewed. PLOF: Independent ASSESSMENT: 
Pt requires encouragement for OOB for am ADL retraining. Pt tolerates standing ~ 5 minutes performing ADL grooming tasks and requires seated rest break and remains seated for remainder of ADL at sinkside. Decrease dynamic standing balance requires CGA w/edouard-care in standing. Pt declines adaptive equipment training w/LB dressing tasks, preferring bending forward method. Pt declines OOB to chair and request return to supine. Pt expressing frustration w/continued hospital stay. Lien Cano Chaney Geralds. EDUCATION Pt educated on energy conservation techniques w/ADLs Progression toward goals: 
[x]          Improving appropriately and progressing toward goals 
[]          Improving slowly and progressing toward goals 
[]          Not making progress toward goals and plan of care will be adjusted PLAN: 
Patient continues to benefit from skilled intervention to address the above impairments. Continue treatment per established plan of care. Discharge Recommendations:  Joselo Shipman Further Equipment Recommendations for Discharge:  rolling walker SUBJECTIVE:  
Patient stated I've been here for two weeks and they still don't know what's wrong with me.  OBJECTIVE DATA SUMMARY: 
  
Cognitive/Behavioral Status: 
Neurologic State: Alert Orientation Level: Oriented X4 Cognition: Follows commands Safety/Judgement: Decreased insight into deficits Functional Mobility and Transfers for ADLs: 
 Bed Mobility: 
Supine to Sit: Additional time;Modified independent Sit to Supine: Additional time;Modified independent Transfers: 
Sit to Stand: Modified independent; Additional time Bed to Chair: Modified independent Bathroom Mobility: Stand-by assistance(w/RW) Balance: 
Sitting: Intact Standing: Intact; With support ADL Intervention: 
Grooming Washing Face: Supervision/set-up Washing Hands: Supervision/set-up Brushing Teeth: Supervision/set-up Brushing/Combing Hair: Stand-by assistance(washing hair, seated sinkside) Upper Body Bathing Bathing Assistance: Stand-by assistance Position Performed: Seated in chair Lower Body Bathing Bathing Assistance: Minimum assistance Perineal  : Contact guard assistance Position Performed: Standing Lower Body : Moderate assistance Position Performed: Seated edge of bed Upper Body Dressing Assistance Hospital Gown: Supervision/ set-up Lower Body Dressing Assistance Underpants: Contact guard assistance Leg Crossed Method Used: No 
Position Performed: Seated edge of bed;Bending forward method Cues: Lozoya Lipa 
 Pain: 
Pre Treatment:0 Post Treatment:0 Activity Tolerance:   
Good Please refer to the flowsheet for vital signs taken during this treatment. After treatment:  
[]  Patient left in no apparent distress sitting up in chair 
[x]  Patient left in no apparent distress in bed 
[x]  Call bell left within reach [x]  Nursing notified 
[]  Caregiver present 
[]  Bed alarm activated Carolyne Wall Loss Time Calculation: 38 mins

## 2019-01-10 NOTE — PROGRESS NOTES
RENAL PROGRESS NOTE Tanika Spearing Assessment/Plan: · SETH on CKD 3. Current worsening of renal function is likely due to decompensated HFrEF and cardiorenal syndrome. Underling seth/ckd are due to biopsy proven anca associated crescentic GN. Scr is up again since yesterday, likely in the setting of rt thigh hematoma with worsening anemia and somewhat low bp. May also be vanc nephrotoxicity. Continue to monitor closely. Would prefer to see improvement in renal function before transferring patient to [facility]. 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 Continue prednisone. Will proceed with 3rd infusion of rituximab tomorrow. · Decompensated HFrEF/volume overload. Improved, continue to hold lasix. · HTN. BP is rather low, d/c isosorbide and metoprolol. · LV thrombus. D/c cardiology, risk of ac outweigh benefits. Coumadin stopped. · NSTVT. On amiodarone. Declined life vest.   
· L4 compression fracture. Ortho recs noted. · Rt thigh hematoma (spontaneous in a setting of ac). No need for surgery per ortho. · Worsening anemia. May need transfusions. Will give epogen. Subjective:Patient complaints off: Feels, looks better. Sitting in bed. No sob with exertion. Lbp/lt hip pain is better. Also c/o rt inner thigh pain on/off. No CP/N/V. Patient Active Problem List  
Diagnosis Code  Acute exacerbation of CHF (congestive heart failure) ([facility]) I50.9  HCAP (healthcare-associated pneumonia) J18.9  LV (left ventricular) mural thrombus I51.3  Cardiomyopathy ([facility] [address] I42.9  Acute renal failure superimposed on stage 3 chronic kidney disease ([facility]) N17.9, N18.3  Aortic stenosis I35.0  Hypertension I10  
 ANCA-associated vasculitis ([facility]) I77.6  NSVT (nonsustained ventricular tachycardia) ([facility]) I47.2 Current Facility-Administered Medications Medication Dose Route Frequency Provider Last Rate Last Dose  riTUXimab (RITUXAN) 500 mg in 0.9% sodium chloride 500 mL infusion  500 mg IntraVENous ONCE TITR Toni Kendall MD      
 traMADol (ULTRAM) tablet 50 mg  50 mg Oral Q6H PRN Farhan Hughalejandrina H, DO   50 mg at 01/10/19 7894  predniSONE (DELTASONE) tablet 60 mg  60 mg Oral DAILY WITH BREAKFAST Marcelo BRAVO MD   60 mg at 01/10/19 0910  
 isosorbide dinitrate (ISORDIL) tablet 5 mg  5 mg Oral BID Filiberto Nichols MD   5 mg at 01/10/19 1138  
 amiodarone (CORDARONE) tablet 400 mg  400 mg Oral DAILY Filiberto Nichols MD   400 mg at 01/10/19 0740  WARFARIN INFORMATION NOTE (COUMADIN)   Other PRN Anusha Colón MD      
 trimethoprim-sulfamethoxazole (BACTRIM, SEPTRA)  mg per tablet 1 Tab  1 Tab Oral Q MON, WED & Fernando Zhu MD   1 Tab at 01/09/19 1718  metoprolol tartrate (LOPRESSOR) tablet 25 mg  25 mg Oral Q12H Arti BRAVO MD   25 mg at 01/10/19 0911  
 sodium chloride (NS) flush 5-10 mL  5-10 mL IntraVENous PRN Robles Ulloa MD      
 atorvastatin (LIPITOR) tablet 40 mg  40 mg Oral DAILY Kendell Lovell MD   40 mg at 01/10/19 0911  
 docusate sodium (COLACE) capsule 100 mg  100 mg Oral BID Kendell Lovell MD   100 mg at 01/09/19 1718  ferrous sulfate tablet 325 mg  325 mg Oral ACB Kendell Lovell MD   325 mg at 01/10/19 0911  
 multivitamin, tx-iron-ca-min (THERA-M w/ IRON) tablet 1 Tab  1 Tab Oral DAILY Kendell Lovell MD   1 Tab at 01/10/19 3122  pantoprazole (PROTONIX) tablet 20 mg  20 mg Oral DAILY Kendell Lovell MD   20 mg at 01/10/19 9682  tamsulosin (FLOMAX) capsule 0.4 mg  0.4 mg Oral DAILY Kendell Lovell MD   0.4 mg at 01/10/19 6329  acetaminophen (TYLENOL) tablet 650 mg  650 mg Oral Q4H PRN Kendell Lovell MD   650 mg at 01/07/19 0450  ondansetron (ZOFRAN) injection 4 mg  4 mg IntraVENous Q4H PRN Franky Lovell MD      
  albuterol-ipratropium (DUO-NEB) 2.5 MG-0.5 MG/3 ML  3 mL Nebulization Q4H PRN Tod Jurado, NP   3 mL at 01/03/19 1146 Objective Vitals:  
 01/10/19 0105 01/10/19 0517 01/10/19 0744 01/10/19 1135 BP: 95/57 104/74 99/64 103/56 Pulse: 70 79 73 79 Resp: 16 16 16 18 Temp: 97.6 °F (36.4 °C) 97.9 °F (36.6 °C) 97.8 °F (36.6 °C) 97.9 °F (36.6 °C) SpO2: 93% 92% 95% 96% Weight:  95.8 kg (211 lb 3.2 oz) Height:  5' 6\" (1.676 m) Intake/Output Summary (Last 24 hours) at 1/10/2019 1143 Last data filed at 1/10/2019 0800 Gross per 24 hour Intake 1296.47 ml Output 1050 ml Net 246.47 ml Admission weight: Weight: 91.2 kg (201 lb) (01/02/19 2318) Last Weight Metrics: 
Weight Loss Metrics 1/10/2019 9/9/2018 Today's Wt 211 lb 3.2 oz 208 lb BMI 34.09 kg/m2 33.57 kg/m2 Physical Assessment:  
 
General: NAD, alert and oriented. Neck: No jvd. LUNGS: Clear to Auscultation, No rales, rhonchi or wheezes. CVS EXM: S1, S2  RRR, no murmurs/gallops/rubs. Abdomen: soft, non tender. Mild tenderness to palpation of the lt flank, lt hip. Rt lateral/inner thigh with large hematoma, non tender. Lower Extremities:  1+ edema, rt>lt. Lab CBC w/Diff Recent Labs  
  01/10/19 
0511 01/08/19 
0455 WBC 13.3* 13.1 RBC 2.75* 2.86* HGB 7.5* 7.9*  
HCT 24.2* 25.7*  
 164 Chemistry Recent Labs  
  01/10/19 
5331 01/09/19 
0528 01/08/19 
2000 01/08/19 
0455 GLU 95 99 148* 102*  142 141 144  
K 3.9 3.9 4.2 4.1  104 102 106 CO2 30 32 29 29 BUN 79* 66* 61* 55* CREA 4.55* 4.10* 3.98* 3.54* CA 8.0* 8.0* 7.8* 7.8* AGAP 8 6 10 9 BUCR 17 16 15 16 ALB 2.6*  --   --  2.3*  
PHOS 5.9*  --   --  6.1* No results found for: IRON, FE, TIBC, IBCT, PSAT, FERR Lab Results Component Value Date/Time Calcium 8.0 (L) 01/10/2019 05:11 AM  
 Phosphorus 5.9 (H) 01/10/2019 05:11 AM  
  
 
Daniel Greenberg M.D. [facility] Phone [phone] Pager [phone]

## 2019-01-11 NOTE — PROGRESS NOTES
Problem: Falls - Risk of 
Goal: *Absence of Falls Document [facility] Abilio Fall Risk and appropriate interventions in the flowsheet. Outcome: Progressing Towards Goal 
Fall Risk Interventions: 
Mobility Interventions: Communicate number of staff needed for ambulation/transfer Medication Interventions: Patient to call before getting OOB, Bed/chair exit alarm, Evaluate medications/consider consulting pharmacy Elimination Interventions: Bed/chair exit alarm, Call light in reach, Patient to call for help with toileting needs, Urinal in reach History of Falls Interventions: Bed/chair exit alarm, Door open when patient unattended

## 2019-01-11 NOTE — PROGRESS NOTES
Problem: Falls - Risk of 
Goal: *Absence of Falls Document Shea Munguia Fall Risk and appropriate interventions in the flowsheet. Outcome: Progressing Towards Goal 
Fall Risk Interventions: 
Mobility Interventions: Communicate number of staff needed for ambulation/transfer Medication Interventions: Patient to call before getting OOB, Bed/chair exit alarm, Evaluate medications/consider consulting pharmacy Elimination Interventions: Bed/chair exit alarm, Call light in reach, Patient to call for help with toileting needs, Urinal in reach History of Falls Interventions: Bed/chair exit alarm, Door open when patient unattended

## 2019-01-11 NOTE — PROGRESS NOTES
Chart reviewed. Plan remains SNF for rehab, wants [facility], has been accepted pending [employer] authorization; when medically stable. Will cont to follow for further needs. Carmen Cutler RN,ext [phone].

## 2019-01-11 NOTE — PROGRESS NOTES
In Patient Progress note Admit Date: 1/2/2019 Impression: 1. SETH on CKD 3( 1.8-2 range recently) . SETH is likely due to decompensated HFrEF and cardiorenal syndrome. CKD is  due to biopsy proven anca associated crescentic GN. Scr is plateauing , anticipate start trending down over next 24-48 hrs. Mildly hypervolemic , will give a dose of lasix to diurese 2. 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 Continue prednisone. Will proceed with 3rd infusion of rituximab today. 3. Decompensated HFrEF/volume overload. Lasix today 4. HTN. BP in good range 5. LV thrombus. risk of ac outweigh benefits, Coumadin stopped. 6. NSTVT. On amiodarone. Declined life vest.   
7. L4 compression fracture. Ortho recs noted. 8. Rt thigh hematoma (spontaneous in a setting of ac). No need for surgery per ortho. 9. anemia. May need transfusions if < 7 Hb. On  epogen. TIW Please call with questions, 
 
Racquel Raman MD FASN Cell [phone] Pager: [phone] Subjective:  
 
Denies SOB/CP Current Facility-Administered Medications:  
  acetaminophen (TYLENOL) tablet 650 mg - Premed for Rituxan infusion, 650 mg, Oral, ONCE, Maynor Kendall MD 
  diphenhydrAMINE (BENADRYL) injection 50 mg - Premed for Rituxan infusion, 50 mg, IntraVENous, ONCE, Maynor Kendall MD 
  epoetin brenda (EPOGEN;PROCRIT) injection 10,000 Units, 10,000 Units, SubCUTAneous, Q TUE, THU & SAT, Juan Kendall MD, 10,000 Units at 01/10/19 2039 
  riTUXimab (RITUXAN) 500 mg in 0.9% sodium chloride 500 mL infusion, 500 mg, IntraVENous, ONCE TITR, Maynor Kendall MD 
  traMADol (ULTRAM) tablet 50 mg, 50 mg, Oral, Q6H PRN, Efe Vieyra H, DO, 50 mg at 01/11/19 0679   predniSONE (DELTASONE) tablet 60 mg, 60 mg, Oral, DAILY WITH BREAKFAST, Maynor Kendall MD, 60 mg at 01/11/19 6289   amiodarone (CORDARONE) tablet 400 mg, 400 mg, Oral, DAILY, Brain BRAVO MD, 400 mg at 01/11/19 7590   WARFARIN INFORMATION NOTE (COUMADIN), , Other, PRN, Tabby Lepe MD 
  trimethoprim-sulfamethoxazole (BACTRIM, SEPTRA)  mg per tablet 1 Tab, 1 Tab, Oral, Q MON, WED & Dante Horner MD, 1 Tab at 01/09/19 1718   sodium chloride (NS) flush 5-10 mL, 5-10 mL, IntraVENous, PRN, Erin Apple MD 
  atorvastatin (LIPITOR) tablet 40 mg, 40 mg, Oral, DAILY, Kendell Lovell MD, 40 mg at 01/11/19 0816 
  docusate sodium (COLACE) capsule 100 mg, 100 mg, Oral, BID, Kendell Lovell MD, 100 mg at 01/09/19 1718   ferrous sulfate tablet 325 mg, 325 mg, Oral, ACB, Kendell Lovell MD, 325 mg at 01/11/19 3619   multivitamin, tx-iron-ca-min (THERA-M w/ IRON) tablet 1 Tab, 1 Tab, Oral, DAILY, Kendell Lovell MD, 1 Tab at 01/11/19 3946   pantoprazole (PROTONIX) tablet 20 mg, 20 mg, Oral, DAILY, Kendell Lovell MD, 20 mg at 01/11/19 0411   tamsulosin (FLOMAX) capsule 0.4 mg, 0.4 mg, Oral, DAILY, Kendell Lovell MD, 0.4 mg at 01/11/19 3893   acetaminophen (TYLENOL) tablet 650 mg, 650 mg, Oral, Q4H PRN, Kendell Lovell MD, 650 mg at 01/07/19 0450   ondansetron (ZOFRAN) injection 4 mg, 4 mg, IntraVENous, Q4H PRN, Kendell Lovell MD 
  albuterol-ipratropium (DUO-NEB) 2.5 MG-0.5 MG/3 ML, 3 mL, Nebulization, Q4H PRN, Alejandra MILLER NP, 3 mL at 01/03/19 1146 Objective:  
 
Visit Vitals /70 (BP 1 Location: Right arm, BP Patient Position: At rest) Pulse 61 Temp 97.6 °F (36.4 °C) Resp 20 Ht 5' 6\" (1.676 m) Wt 97.1 kg (214 lb) SpO2 95% BMI 34.54 kg/m² Intake/Output Summary (Last 24 hours) at 1/11/2019 1229 Last data filed at 1/11/2019 4710 Gross per 24 hour Intake 240 ml Output 950 ml Net -710 ml Physical Exam:  
 
gen NAD HENT mmm RS AEBE coarse CVS s1 s2 wnl no JVD 
GI soft BS + Ext 1+ LE edema Data Review: 
 
Recent Labs  
  01/11/19 
3041 WBC 9.9  
RBC 2.60* HCT 23.3*  
MCV 89.6 MCH 27.7 MCHC 30.9*  
RDW 14.7* Recent Labs  
  01/11/19 
0430 01/10/19 
0511 01/09/19 
0528 01/08/19 2000 BUN 82* 79* 66* 61* CREA 4.51* 4.55* 4.10* 3.98* CA 7.8* 8.0* 8.0* 7.8* ALB 2.4* 2.6*  --   --   
K 4.2 3.9 3.9 4.2  140 142 141  102 104 102 CO2 30 30 32 29 PHOS 5.9* 5.9*  --   --   
* 95 99 148* Chente Guillen MD

## 2019-01-11 NOTE — PROGRESS NOTES
[address] [place], [place] [address] Inpatient Daily Progress Note Daily progress Note Patient: Anat Hamm MRN: [id number]  CSN: [id number] YOB: 1932  Age: 80 y.o. 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 Oxygen sats 88% on RA and was placed on oxygen with some relief. Pt was recently discharged from [facility] on 1/12019 for transient vision loss- seen by neuro and vascular surgery deemed likely ischemic, also had CT guided renal biopsy on 12/31 which confirmed acute crescentic GN. Pt has been on high dose oral Prednisone and so far has received two doses of Rituximab (last dose on 1/12019). CT chest note pulmonary lesions which are felt to be due to vasculitis vs mets. Work up in the ED - CXR findings suspicious for pulmonary edema superimposed on chronic interstitial lung disease, suspected additional bibasilar superimposed alveolar component although pneumonia is difficult to exclude, probable small pleural effusions. Troponin 0.09 - -> 0.11 - -> 0.11, creatinine 2.48 and BNP 55176 in ED. Pt started on IV diuretics this admission and admitted for further management. Echo obtained 1/3 showed EF < 15%, LV hypertrophy, LV global hypokinesis, small echogenic and calcified thrombus LV, porcine bioprosthetic aortic valve, trace mitral valve regurgitation. Cardiology consulted. Pt started on Heparin gtt and oral Coumadin.   Creatinine increased to 3 on 1/4, nephrology consulted given recent biopsy findings and increasing creatinine given diuresis. Nephrology recommends continue oral prednisone, pt's next dose of Rituximab was supposed to be on 1/8 but will hold until pneumonia is treated. Pt started on Bactrim for PCP prophylaxis. Ok to resume Lasix per nephrology. Lasix restarted. Patient was ok to go home 1/5 but required 02 and delayed dc in addition life vest was ordered but patient refused. Heparin gtt dc'd on 1/7 as INR 2.3- as recommended per cardiology notes. Acute weakness this am, pt unable to sit up in bed and PT unable to work with pt- he was ambulatory over weekend. Pt c/o left lateral thigh pain and numbness inner right thigh. CT abd/pelvis w/o contrast showed lobulated intramuscular hypodense lesion involving RLE muscles greatest in pectineus muscle, right pleural effusion w/ multiple pleural based nodules, abnormal linear hypodensity within anteroinferior aspect of L4 vertebral body suggesting compression fracture w/ possible localized osteonecrotic cavity. Findings discussed with pt and daughter- plan for MRI L spine and right thigh- pending. Pt has agreed to SNF-  following and matching to facilities. Pt will not be discharged. Official read from MRI still pending however radiologist left note in chart from MRI L spine note acute fracture inferior endplate L4. MRI right femur 10 cm hemorrhagic fluid collection, right adductor longus muscle w/ moderate surrounding perilesional intramuscular and intermuscular medial right thigh hemorrhage or edema. Ortho consulted- no clinical evidence of compartment syndrome, symptoms more related to L4 compression fracture- continue prednisone and supplement with lumbar corset- f/u in office with Dr. Mohr Files- consider epidural steroid injections. INR up to 3.7 today- Coumadin dc'd- discussed with cardiology- risks outweighs benefit at this time, LV thrombus calcified on Echo, thus Coumadin will not be continued- pt to start on ASA/Plavix once INR < 2. Creatinine up to 4. Plan for 3rd [facility] Lum[facility]. Planning for d/c to [facility] when medically stable. INR down to 1.3 on 1/10- plan to start ASA and Plavix on 1/11. Creatinine continues to increase- defer to nephrology for management. Note downward trend of creatinine. Planning for Rituximab infusion today. Will probably be ready for d/c to [facility] early next week. Assessment/Plan:  
 
Patient Active Problem List  
Diagnosis Code  Acute exacerbation of CHF (congestive heart failure) ([facility]) I50.9  HCAP (healthcare-associated pneumonia) J18.9  LV (left ventricular) mural thrombus I51.3  Cardiomyopathy ([facility] [address] I42.9  Acute renal failure superimposed on stage 3 chronic kidney disease ([facility]) N17.9, N18.3  Aortic stenosis I35.0  Hypertension I10  
 ANCA-associated vasculitis ([facility]) I77.6  NSVT (nonsustained ventricular tachycardia) ([facility]) I47.2 A/P: 
Acute Exacerbation of CHF /Cardiomyopathy 
- cardiology following- BNP 54178 on admission - Lasix daily (oral), monitor I/O 
- Echo 1/3 with EF < 15%, LV hypertrophy, LV global hypokinesis, small echogenic and calcified thrombus LV, porcine bioprosthetic aortic valve, trace mitral valve regurgitation 
- resume home meds - Patient refuses life vest 
 
Acute fracture inferior end plate L4 
- pt with acute weakness, inability to walk or sit up in bed despite assistance with PT 
- denies falls, bowel/bladder incontinence - MRI L spine showed acute fracture inferior endplate L4 
- ortho consulted- continue prednisone and supplement with lumbar corset 
- f/u outpatient with Dr. Yee Coughlin, consider epidural steroid injections HCAP 
- completed IV abx 
- started on Bactrim per nephrology for PCP prophylaxis 
- supplemental oxygen, duo-nebs, prednisone daily LV Thrombus 
- noted on Echo this admission 
- Heparin gtt- dc'd 1/7 
 - Coumadin dc'd 1/9 due to supratherapeutic INR- Vitamin K 10 mg x 1 today 
- discussed with cardiology- risks outweigh benefit, Coumadin will not be continued at this time since LV thrombus appeared calcified on Echo 
- plan to start ASA/Plavix tomorrow 1/12 NSVT 
- cardiology following 
- was on Amiodarone IV, now on 400 mg oral 
 
Hx of aortic stenosis and aortic valve replacement 
- started on Coumadin 1/3 
- INR up to 3.7 - Coumadin dc'd- risks outweigh benefit, thus Coumadin will not be continued 
- cardiology following Acute on chronic renal failure superimposed on CKD 3 
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 
- continue Prednisone 60 mg daily - pt received two doses of of Rituximab (last dose on 1/12019), next dose today 1/11 
- monitor I/O's, BMP Pulmonary Nodules 
-CT chest done 12/24/18 with multiple pulmonary and pleural based nodules suspicious for neoplasm 
- was recommended to pursue PET scan at d/c Hypertension 
- continue home meds DVT Prophylaxis 
- supratherapeutic INR, Coumadin dc'd 
- start on ASA/Plavix 1/12 [facility] Pager:  [phone] Office:  [phone] Subjective:  
  
Feeling better today. No chest pain, SOB. Slight improvement in bruising RLE. Daughter at bedside and updated. Objective:  
  
Visit Vitals /70 (BP 1 Location: Right arm, BP Patient Position: At rest) Pulse 61 Temp 97.6 °F (36.4 °C) Resp 20 Ht 5' 6\" (1.676 m) Wt 97.1 kg (214 lb) SpO2 95% BMI 34.54 kg/m² Physical Exam: 
General appearance: alert, cooperative, no distress, appears stated age Head: Normocephalic, without obvious abnormality, atraumatic Lungs: clear to auscultation bilaterally Heart: regular rate and rhythm, S1, S2 normal, no murmur, click, rub or gallop Abdomen: soft, non tender, non distended. Normoactive bowel sounds. Extremities: extremities normal, atraumatic, no cyanosis, trace edema BLE Skin: scattered bruising BUE, erythema/bruising noted to right inner thigh down to knee, also bruising noted to posterior right thigh- improving- RLE soft to touch Neurologic: generalized weakness- MORALES 
PSY: Mood and affect normal, appropriately behaved Intake and Output: 
Current Shift:  No intake/output data recorded. Last three shifts:  01/09 1901 - 01/11 0700 In: 882 [P. O.:782; I.V.:100] Out: 1800 [Urine:1800] Recent Results (from the past 24 hour(s)) PROTHROMBIN TIME + INR Collection Time: 01/11/19  4:30 AM  
Result Value Ref Range Prothrombin time 14.6 11.5 - 15.2 sec INR 1.2 0.8 - 1.2    
CBC W/O DIFF Collection Time: 01/11/19  4:30 AM  
Result Value Ref Range WBC 9.9 4.6 - 13.2 K/uL  
 RBC 2.60 (L) 4.70 - 5.50 M/uL HGB 7.2 (L) 13.0 - 16.0 g/dL HCT 23.3 (L) 36.0 - 48.0 % MCV 89.6 74.0 - 97.0 FL  
 MCH 27.7 24.0 - 34.0 PG  
 MCHC 30.9 (L) 31.0 - 37.0 g/dL  
 RDW 14.7 (H) 11.6 - 14.5 % PLATELET 713 925 - 745 K/uL MPV 12.0 (H) 9.2 - 11.8 FL  
RENAL FUNCTION PANEL Collection Time: 01/11/19  4:30 AM  
Result Value Ref Range Sodium 144 136 - 145 mmol/L Potassium 4.2 3.5 - 5.5 mmol/L Chloride 106 100 - 108 mmol/L  
 CO2 30 21 - 32 mmol/L Anion gap 8 3.0 - 18 mmol/L Glucose 113 (H) 74 - 99 mg/dL BUN 82 (H) 7.0 - 18 MG/DL Creatinine 4.51 (H) 0.6 - 1.3 MG/DL  
 BUN/Creatinine ratio 18 12 - 20 GFR est AA 15 (L) >60 ml/min/1.73m2 GFR est non-AA 12 (L) >60 ml/min/1.73m2 Calcium 7.8 (L) 8.5 - 10.1 MG/DL Phosphorus 5.9 (H) 2.5 - 4.9 MG/DL Albumin 2.4 (L) 3.4 - 5.0 g/dL Lab Results Component Value Date/Time  Glucose 113 (H) 01/11/2019 04:30 AM  
 Glucose 95 01/10/2019 05:11 AM  
 Glucose 99 01/09/2019 05:28 AM  
 Glucose 148 (H) 01/08/2019 08:00 PM  
 Glucose 102 (H) 01/08/2019 04:55 AM  
  
 
Imaging: No results found. Medication List Reviewed: 
Current Facility-Administered Medications Medication Dose Route Frequency  epoetin brenda (EPOGEN;PROCRIT) injection 10,000 Units  10,000 Units SubCUTAneous Q TUE, THU & SAT  riTUXimab (RITUXAN) 500 mg in 0.9% sodium chloride 500 mL infusion  500 mg IntraVENous ONCE TITR  
 acetaminophen (TYLENOL) tablet 650 mg (Pre-med for Rituximab infusion)  650 mg Oral ONCE  
 diphenhydrAMINE (BENADRYL) injection 50 mg  (Pre-med for Rituximab infusion)  50 mg IntraVENous ONCE  
 traMADol (ULTRAM) tablet 50 mg  50 mg Oral Q6H PRN  predniSONE (DELTASONE) tablet 60 mg  60 mg Oral DAILY WITH BREAKFAST  amiodarone (CORDARONE) tablet 400 mg  400 mg Oral DAILY  WARFARIN INFORMATION NOTE (COUMADIN)   Other PRN  
 trimethoprim-sulfamethoxazole (BACTRIM, SEPTRA)  mg per tablet 1 Tab  1 Tab Oral Q MON, WED & FRI  sodium chloride (NS) flush 5-10 mL  5-10 mL IntraVENous PRN  
 atorvastatin (LIPITOR) tablet 40 mg  40 mg Oral DAILY  docusate sodium (COLACE) capsule 100 mg  100 mg Oral BID  ferrous sulfate tablet 325 mg  325 mg Oral ACB  multivitamin, tx-iron-ca-min (THERA-M w/ IRON) tablet 1 Tab  1 Tab Oral DAILY  pantoprazole (PROTONIX) tablet 20 mg  20 mg Oral DAILY  tamsulosin (FLOMAX) capsule 0.4 mg  0.4 mg Oral DAILY  acetaminophen (TYLENOL) tablet 650 mg  650 mg Oral Q4H PRN  
 ondansetron (ZOFRAN) injection 4 mg  4 mg IntraVENous Q4H PRN  
 albuterol-ipratropium (DUO-NEB) 2.5 MG-0.5 MG/3 ML  3 mL Nebulization Q4H PRN

## 2019-01-11 NOTE — ROUTINE PROCESS
1415 - At bedside to assist with chemo infusion. New IV #20 started by Jesus Tapia RN - patient pre-medicated per orders with benadryl and tylenol. NS started at 100 cc/hr. IV lasix administered per orders. Isaías at bedside. Rituxan delivered to bedside by pharmacy 1500 - infusion started

## 2019-01-11 NOTE — PROGRESS NOTES
Problem: Mobility Impaired (Adult and Pediatric) Goal: *Acute Goals and Plan of Care (Insert Text) Physical Therapy Goals Initiated 1/7/2019 and to be accomplished within 7 days. 1.  Patient will complete all bed mobility with minimal assistance/contact guard assist in order to prepare for EOB/OOB activity. 2.  Patient will perform sit <> stand with moderate assistance  in order to prepare for OOB/gait activity. 3.  Patient will perform bed to chair transfers with moderate assistance  in order to promote mobility and encourage seated activity to progress towards their prior level of function. 4.  Patient will ambulate 15 feet with minimal assistance/contact guard assist using LRAD in order to prepare for safe negotiation of their environment. Outcome: Progressing Towards Goal 
 
PHYSICAL THERAPY: Daily TREATMENT Note INPATIENT: [employer]: Hospital Day: 10 Patient: Anabel Steinberg (78 y.o. male)    Date: 1/11/2019 Primary Diagnosis: HCAP (healthcare-associated pneumonia) Acute exacerbation of CHF (congestive heart failure) (HCC)  
,  ,  
Precautions: (None) Chart, physical therapy assessment, plan of care and goals were reviewed. ASSESSMENT: 
Pt pleasant and cooperative, educated on back brace, body mechanics to reduce strain on back with mobility. Instructed in log rolling technique with min a and verbal cues for hand placement, body positioning and breathing, min a for sit<>stand with verbal cues for hand placement and safety with RW. Pt instructed in donning of brace prior to gait training X 150 ft, pt stated brace helped reduce pain, required frequent standing rest breaks. Pt with trendelenburg gait and excessive toe out on L LE. Pt required min a for log rolling back into bed, educated on body mechanics and connection between back injury and L LE pain. Progression toward goals: 
      Improving appropriately and progressing toward goals PLAN: 
 Patient continues to benefit from skilled intervention to address the above impairments. Continue treatment per established plan of care. EDUCATION:  
Education:  Patient was educated on the following topics: body mechanics Barriers to Learning/Limitations: None Compensate with: visual, verbal, tactile, kinesthetic cues/model Discharge Recommendations:  Joselo Shipman Further Equipment Recommendations for Discharge:  TBD at next level of care Factors which may impact discharge planning: none SUBJECTIVE:  
Patient stated I know you say its my back but my leg hurts.  OBJECTIVE DATA SUMMARY:  
Critical Behavior: 
Neurologic State: Alert Orientation Level: Oriented X4 Cognition: Follows commands, Appropriate for age attention/concentration Safety/Judgement: Decreased insight into deficits Functional Mobility: 
 
 
Functional Status Indep (I) Mod I Super-vision Min A Mod A Max A Total A Assist x2 Verbal cues Additional time Not tested Comments Rolling []  []  [] [x]    []    []  []  [] [x] [x] [] Supine to sit []  []  [] [x]  []  []  []  [] [x] [x] [] Sit to supine []  []  [] [x]  []  []  []  [] [x] [x] [] Sit to stand []  []  [] [x]  []  []  []  [] [x] [x] [] Stand to sit []  []  [] [x]  []  []  []  [] [x] [x] [] Bed to chair transfers []  []  [] []  []  []  []  [] [] [] [] Balance Good [address] Poor Unable Not tested Comments Sitting static [x]  []  []  []  [] Sitting dynamic [x]  []  []  []  []   
Standing static [x]  []  []  []  []   
Standing dynamic [x]  []  []  []  [] With support Mobility/Gait:  
Level of Assistance: Contact guard assistance Assistive Device: portable oxygen and rolling walker Distance Ambulated: 150 feet Base of Support: center of gravity altered Speed/June: slow Step Length: left shortened and right shortened Swing Pattern: left asymmetrical 
Stance: right increased Gait Abnormalities: trendelenburg Stairs:  
Level of Assistance: Unable at this time Vital Signs Temp: 97.6 °F (36.4 °C) Pulse (Heart Rate): 61    
BP: 134/70 Resp Rate: 20    
O2 Sat (%): 95 %Pain:Pre treatment pain level:5 Post treatment pain level:5Pain Scale 1: Visual 
Pain Intensity 1: 5 Pain Location 1: Leg 
Pain Orientation 1: Left Pain Description 1: Aching Pain Intervention(s) 1: Medication (see MAR) Activity Tolerance:  
Good After treatment:  
 
Patient left in no apparent distress in bed Call bell left within reach Caregiver present Elida Braga PTA Time Calculation: 27 mins

## 2019-01-11 NOTE — PROGRESS NOTES
Bedside report given by Kip Willett RN  
 
2038: Patient is alert and oriented X4, C/O pain left leg scale of 5/10, PRN meds given P.O, shift assessment completed, call bell within reach 2240: sleeping comfortably, with no signs of any discomforts 
 
0209: Pain meds given as requested by pt for left leg pain 0330: pt sleeping, continue monitoring 0630: in bed watching tv 
 
0725: . Alaina Recio Bedside shift change report given to David Hannon RN (oncoming nurse) by Yuan Dempsey RN(offgoing nurse). Report included the following information SBAR, Kardex, Intake/Output, MAR, Med Rec Status, Cardiac Rhythm NSR,BBB on tele #12 and Alarm Parameters .

## 2019-01-11 NOTE — PROGRESS NOTES
[facility] Progress Note Date: 2019 Name: Maia Robin MRN: [id number] : 1932 Chemotherapy Cycle: Rituxmab 500 mg Flate Rate Mr. Vimal Bryan  Is inpatient at [facility] [address] room 3009. Patient has received Rituxmab at an outpatient facility. This is his thrird and final infusion per Dr. Bui Speaker. Patient stated, He has not had an prior reactions to Rituxmab. As this is a subsequent infusion, patient rate was started  at 100 ml. Mr. Simmons's vitals were reviewed. Patient Vitals for the past 12 hrs: 
 Temp Pulse Resp BP SpO2  
19 97.8 °F (36.6 °C) 68 18 96/58 94 % 19 98 °F (36.7 °C) 83 18 112/67 95 % 19 1935 97.9 °F (36.6 °C) 85 18 123/68 94 % 19 1905 98 °F (36.7 °C) 73 19 124/65 93 % 19 1835 98.1 °F (36.7 °C) 84 18 128/65 96 % 19 1805 98.2 °F (36.8 °C) 86 18 131/62 97 % 19 1735 98.1 °F (36.7 °C) 87 18 134/65 94 % 19 1705 98 °F (36.7 °C) 79 18 105/68 94 % 19 1637 98.2 °F (36.8 °C) 78 18 126/61 94 % 19 1605 97.7 °F (36.5 °C) 78 18 112/59 94 % 19 1535 98.1 °F (36.7 °C) 78 18 122/64 94 % 19 1446 97.6 °F (36.4 °C) 81 18 116/63 92 % Peripheral Iv 20G intiated in patient left forearm with brisk blood return and flushes easily. By Yuri Madden RN. NS initiated @ 100ml/hr with treatment. Lab results were obtained and reviewed. Recent Results (from the past 12 hour(s)) PROTHROMBIN TIME + INR Collection Time: 19  4:30 AM  
Result Value Ref Range Prothrombin time 14.6 11.5 - 15.2 sec INR 1.2 0.8 - 1.2    
CBC W/O DIFF Collection Time: 19  4:30 AM  
Result Value Ref Range WBC 9.9 4.6 - 13.2 K/uL  
 RBC 2.60 (L) 4.70 - 5.50 M/uL HGB 7.2 (L) 13.0 - 16.0 g/dL HCT 23.3 (L) 36.0 - 48.0 % MCV 89.6 74.0 - 97.0 FL  
 MCH 27.7 24.0 - 34.0 PG  
 MCHC 30.9 (L) 31.0 - 37.0 g/dL  
 RDW 14.7 (H) 11.6 - 14.5 % PLATELET 709 591 - 548 K/uL MPV 12.0 (H) 9.2 - 11.8 FL  
RENAL FUNCTION PANEL Collection Time: 01/11/19  4:30 AM  
Result Value Ref Range Sodium 144 136 - 145 mmol/L Potassium 4.2 3.5 - 5.5 mmol/L Chloride 106 100 - 108 mmol/L  
 CO2 30 21 - 32 mmol/L Anion gap 8 3.0 - 18 mmol/L Glucose 113 (H) 74 - 99 mg/dL BUN 82 (H) 7.0 - 18 MG/DL Creatinine 4.51 (H) 0.6 - 1.3 MG/DL  
 BUN/Creatinine ratio 18 12 - 20 GFR est AA 15 (L) >60 ml/min/1.73m2 GFR est non-AA 12 (L) >60 ml/min/1.73m2 Calcium 7.8 (L) 8.5 - 10.1 MG/DL Phosphorus 5.9 (H) 2.5 - 4.9 MG/DL Albumin 2.4 (L) 3.4 - 5.0 g/dL Chemo dosages verified with today's BSA and found to be within 10% of ordered dosages. Pre-medications (Tylenol 650 mg and benadryl 50 mg IV  were administered as ordered and chemotherapy was initiated 30 mins after pre- meds. Reviewed expected side effects of premeds with patient. Rituxmab was infused at  100 ml/hr. VS stable at end of infusion and pt denied complaints. Line flushed with NS and blood return from peripheral IV. Mr. Winston La tolerated infusion, and had no complaints at this time. Infusion completed at 2020. Patient resting at this time. Informed Primary Nurse of infusion completed as well as bed alarm on and call bell within patient reach. Teddy Combs RN 
January 11, 2019

## 2019-01-12 NOTE — PROGRESS NOTES
RENAL PROGRESS NOTE Haider Bryan Assessment/Plan: 1. SETH on CKD3: nonoliguric. Creat coming down. 2. HFrEF: Cardiorenal syndrome. Lasix 60 mg IV today. 3. ANCA: associated crescentic GN/microscopic polyangiitis. Continue Prednisone. S/p 3 doses of Rituximab. 4. HTN: BP in good range. 5. LV thrombus. risk of ac outweigh benefits, Coumadin stopped.  
6. NSTVT. On amiodarone. Declined life vest.   
7. L4 compression fracture. Ortho recs noted. 8. Rt thigh hematoma (spontaneous in a setting of ac). No need for surgery per ortho. 9. anemia. May need transfusions if < 7 Hb. On  epogen. TIW Subjective:Patient complaints of: No complaints. No SOB/CP/N/V. Patient Active Problem List  
Diagnosis Code  Acute exacerbation of CHF (congestive heart failure) ([facility]) I50.9  HCAP (healthcare-associated pneumonia) J18.9  LV (left ventricular) mural thrombus I51.3  Cardiomyopathy ([facility] [address] I42.9  Acute renal failure superimposed on stage 3 chronic kidney disease ([facility]) N17.9, N18.3  Aortic stenosis I35.0  Hypertension I10  
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  NSVT (nonsustained ventricular tachycardia) ([facility]) I47.2 Current Facility-Administered Medications Medication Dose Route Frequency Provider Last Rate Last Dose  LORazepam (ATIVAN) tablet 1 mg  1 mg Oral Q6H PRN Na Melton H, DO   1 mg at 01/12/19 1054  aspirin chewable tablet 81 mg  81 mg Oral DAILY Frannie Nix NP   81 mg at 01/12/19 1654  clopidogrel (PLAVIX) tablet 75 mg  75 mg Oral DAILY Frannie Nix NP   75 mg at 01/12/19 4886  epoetin brenda (EPOGEN;PROCRIT) injection 10,000 Units  10,000 Units SubCUTAneous Q PAULINAE, TERRELLU & SAT Adelaida Saenz MD   10,000 Units at 01/10/19 2039  traMADol (ULTRAM) tablet 50 mg  50 mg Oral Q6H PRN Virgle Human H, DO   50 mg at 01/12/19 8699  predniSONE (DELTASONE) tablet 60 mg  60 mg Oral DAILY WITH BREAKFAST Rao BRAVO MD   60 mg at 01/12/19 0801  
 amiodarone (CORDARONE) tablet 400 mg  400 mg Oral DAILY Germain Price MD   400 mg at 01/12/19 1064  trimethoprim-sulfamethoxazole (BACTRIM, SEPTRA)  mg per tablet 1 Tab  1 Tab Oral Q MON, WED & Rekha Delgado MD   1 Tab at 01/11/19 1816  
 sodium chloride (NS) flush 5-10 mL  5-10 mL IntraVENous PRN Xavier Dawson MD      
 atorvastatin (LIPITOR) tablet 40 mg  40 mg Oral DAILY Kendell Lovell MD   40 mg at 01/12/19 0801  
 docusate sodium (COLACE) capsule 100 mg  100 mg Oral BID Kendell Lovell MD   100 mg at 01/09/19 1718  ferrous sulfate tablet 325 mg  325 mg Oral ACB Kendell Lovell MD   325 mg at 01/12/19 0801  multivitamin, tx-iron-ca-min (THERA-M w/ IRON) tablet 1 Tab  1 Tab Oral DAILY Kendell Lovell MD   1 Tab at 01/11/19 8776  pantoprazole (PROTONIX) tablet 20 mg  20 mg Oral DAILY Kendell Lovell MD   20 mg at 01/12/19 0801  tamsulosin (FLOMAX) capsule 0.4 mg  0.4 mg Oral DAILY Kendell Lovell MD   0.4 mg at 01/12/19 0801  acetaminophen (TYLENOL) tablet 650 mg  650 mg Oral Q4H PRN Kendell Lovell MD   650 mg at 01/11/19 2324  ondansetron (ZOFRAN) injection 4 mg  4 mg IntraVENous Q4H PRN Kendell Lovell MD      
 albuterol-ipratropium (DUO-NEB) 2.5 MG-0.5 MG/3 ML  3 mL Nebulization Q4H PRN Radha Leach NP   3 mL at 01/03/19 1146 Objective Vitals:  
 01/11/19 2352 01/12/19 0452 01/12/19 0820 01/12/19 1155 BP: 139/67 135/70 122/62 127/75 Pulse: 72 80 73 77 Resp: 18 20 20 20 Temp: 98.1 °F (36.7 °C) 98 °F (36.7 °C) 98.6 °F (37 °C) 98.9 °F (37.2 °C) SpO2: 96% 93% 95% 94% Weight:  99.7 kg (219 lb 12.8 oz) Height:      
 
 
 
Intake/Output Summary (Last 24 hours) at 1/12/2019 1230 Last data filed at 1/12/2019 9624 Gross per 24 hour Intake 720 ml Output 700 ml Net 20 ml Admission weight: Weight: 91.2 kg (201 lb) (01/02/19 2318) Last Weight Metrics: 
Weight Loss Metrics 1/12/2019 9/9/2018 Today's Wt 219 lb 12.8 oz 208 lb BMI 35.48 kg/m2 33.57 kg/m2 Physical Assessment:  
 
General: NAD, alert and oriented. Neck: No jvd. LUNGS: Clear to Auscultation, No rales, rhonchi or wheezes. CVS EXM: S1, S2  RRR, no murmurs/gallops/rubs. Abdomen: soft, non tender. Lower Extremities:  no edema. Lab CBC w/Diff Recent Labs  
  01/12/19 
0400 01/11/19 
0430 01/10/19 
4166 WBC 11.1 9.9 13.3*  
RBC 2.68* 2.60* 2.75* HGB 7.4* 7.2* 7.5* HCT 24.7* 23.3* 24.2*  
 146 164 Chemistry Recent Labs  
  01/12/19 
0400 01/11/19 
0430 01/10/19 
2944 * 113* 95  144 140  
K 4.5 4.2 3.9  106 102 CO2 29 30 30 BUN 82* 82* 79* CREA 4.16* 4.51* 4.55* CA 7.7* 7.8* 8.0* AGAP 8 8 8 BUCR 20 18 17 ALB 2.6* 2.4* 2.6* PHOS 5.6* 5.9* 5.9* No results found for: IRON, FE, TIBC, IBCT, PSAT, FERR Lab Results Component Value Date/Time Calcium 7.7 (L) 01/12/2019 04:00 AM  
 Phosphorus 5.6 (H) 01/12/2019 04:00 AM  
  
 
Sara Pastrana MD 
[facility] Pager: [phone]

## 2019-01-12 NOTE — PROGRESS NOTES
Internal Medicine Progress Note Patient's Name: Denton Thomas Admit Date: 1/2/2019 Length of Stay: 9 Assessment/Plan Active Hospital Problems Diagnosis Date Noted  NSVT (nonsustained ventricular tachycardia) ([facility] 01/07/2019  LV (left ventricular) mural thrombus 01/04/2019  Cardiomyopathy ([facility] 01/04/2019  Acute renal failure superimposed on stage 3 chronic kidney disease ([facility] 01/04/2019  Aortic stenosis 01/04/2019  Hypertension 01/04/2019  ANCA-associated vasculitis ([facility] 01/04/2019  Acute exacerbation of CHF (congestive heart failure) ([facility] 01/03/2019 Monitor clinically Gentle diuresis Strict I&O Echo in am 
 
  
 HCAP (healthcare-associated pneumonia) 01/03/2019 HCAP Abx coverage with Zosyn, Levaquin and Vanco 
Nebs as needed O2 to maintain pulse ox >91% - Cr trending down - s/p 3 doses of rituximab 
- Cont prednisone - IV lasix per nephro - BP in good range - No AC w/ coumadin as benefit outweighs risk given fall risk and large hematoma in leg given that lesion seen on echo likely calcified 
- Cont ASA/plavix 
- Hgb at 7.4 
- Trend and transfuse if < 7 
- Cont amio, pt declined life vest 
- PT/OT 
- Cont acceptable home medications for chronic conditions  
- DVT protocol I have personally reviewed all pertinent labs and films that have officially resulted over the last 24 hours. I have personally checked for all pending labs that are awaiting final results. Subjective Pt s/e @ bedside No major events overnight Anxious Otherwise, doing OK [place] to get to rehab Denies CP or SOB Objective Visit Vitals /75 Pulse 77 Temp 98.9 °F (37.2 °C) Resp 20 Ht 5' 6\" (1.676 m) Wt 99.7 kg (219 lb 12.8 oz) SpO2 94% BMI 35.48 kg/m² Physical Exam: 
General Appearance: NAD, conversant Lungs: Decreased BS B/L with crackles CV: RRR, no m/r/g Abdomen: soft, non-tender, normal bowel sounds Extremities: no cyanosis, RLE w/ large area of ecchymoses, no TTP Neuro: No focal deficits, motor/sensory intact Lab/Data Reviewed: 
BMP:  
Lab Results Component Value Date/Time  01/12/2019 04:00 AM  
 K 4.5 01/12/2019 04:00 AM  
  01/12/2019 04:00 AM  
 CO2 29 01/12/2019 04:00 AM  
 AGAP 8 01/12/2019 04:00 AM  
  (H) 01/12/2019 04:00 AM  
 BUN 82 (H) 01/12/2019 04:00 AM  
 CREA 4.16 (H) 01/12/2019 04:00 AM  
 GFRAA 17 (L) 01/12/2019 04:00 AM  
 GFRNA 14 (L) 01/12/2019 04:00 AM  
 
CBC:  
Lab Results Component Value Date/Time WBC 11.1 01/12/2019 04:00 AM  
 HGB 7.4 (L) 01/12/2019 04:00 AM  
 HCT 24.7 (L) 01/12/2019 04:00 AM  
  01/12/2019 04:00 AM  
 
 
Imaging Reviewed: 
No results found. Medications Reviewed: 
Current Facility-Administered Medications Medication Dose Route Frequency  LORazepam (ATIVAN) tablet 1 mg  1 mg Oral Q6H PRN  
 aspirin chewable tablet 81 mg  81 mg Oral DAILY  clopidogrel (PLAVIX) tablet 75 mg  75 mg Oral DAILY  epoetin brenda (EPOGEN;PROCRIT) injection 10,000 Units  10,000 Units SubCUTAneous Q TUE, THU & SAT  traMADol (ULTRAM) tablet 50 mg  50 mg Oral Q6H PRN  predniSONE (DELTASONE) tablet 60 mg  60 mg Oral DAILY WITH BREAKFAST  amiodarone (CORDARONE) tablet 400 mg  400 mg Oral DAILY  trimethoprim-sulfamethoxazole (BACTRIM, SEPTRA)  mg per tablet 1 Tab  1 Tab Oral Q MON, WED & FRI  sodium chloride (NS) flush 5-10 mL  5-10 mL IntraVENous PRN  
 atorvastatin (LIPITOR) tablet 40 mg  40 mg Oral DAILY  docusate sodium (COLACE) capsule 100 mg  100 mg Oral BID  ferrous sulfate tablet 325 mg  325 mg Oral ACB  multivitamin, tx-iron-ca-min (THERA-M w/ IRON) tablet 1 Tab  1 Tab Oral DAILY  pantoprazole (PROTONIX) tablet 20 mg  20 mg Oral DAILY  tamsulosin (FLOMAX) capsule 0.4 mg  0.4 mg Oral DAILY  acetaminophen (TYLENOL) tablet 650 mg  650 mg Oral Q4H PRN  
  ondansetron (ZOFRAN) injection 4 mg  4 mg IntraVENous Q4H PRN  
 albuterol-ipratropium (DUO-NEB) 2.5 MG-0.5 MG/3 ML  3 mL Nebulization Q4H PRN Swetha Sheppard, DO Internal Medicine, Hospitalist 
Pager: [phone]

## 2019-01-12 NOTE — PROGRESS NOTES
0700: assumed care of patient from [facility], RN 
 
0730: Pt is extremely anxious which is increasing his WOB. 0900: Pt's daughter bedside. Pt appears better with visitors in room. However, still gets worked up easily. 1000: Received Ativan PO orders. 1200: No issues at this time. 1900 Bedside shift change report given to Farzad Encarnacion RN (oncoming nurse) by Dolores Melissa RN (offgoing nurse). Report included the following information SBAR, Kardex, MAR and Recent Results.

## 2019-01-12 NOTE — PROGRESS NOTES
Problem: Falls - Risk of 
Goal: *Absence of Falls Document Bessy Ying Fall Risk and appropriate interventions in the flowsheet. Outcome: Progressing Towards Goal 
Fall Risk Interventions: 
Mobility Interventions: Patient to call before getting OOB, PT Consult for mobility concerns, OT consult for ADLs Medication Interventions: Teach patient to arise slowly, Patient to call before getting OOB Elimination Interventions: Patient to call for help with toileting needs, Toilet paper/wipes in reach, Call light in reach, Urinal in reach History of Falls Interventions: Door open when patient unattended, Room close to nurse's station, Bed/chair exit alarm Problem: Falls - Risk of 
Goal: *Absence of Falls Document Bessy Ying Fall Risk and appropriate interventions in the flowsheet. Outcome: Progressing Towards Goal 
Fall Risk Interventions: 
Mobility Interventions: Patient to call before getting OOB, PT Consult for mobility concerns, OT consult for ADLs Medication Interventions: Teach patient to arise slowly, Patient to call before getting OOB Elimination Interventions: Patient to call for help with toileting needs, Toilet paper/wipes in reach, Call light in reach, Urinal in reach History of Falls Interventions: Door open when patient unattended, Room close to nurse's station, Bed/chair exit alarm Problem: Pressure Injury - Risk of 
Goal: *Prevention of pressure injury Document Luis Angel Scale and appropriate interventions in the flowsheet. Outcome: Progressing Towards Goal 
Pressure Injury Interventions: 
Sensory Interventions: Assess changes in LOC, Check visual cues for pain, Keep linens dry and wrinkle-free Activity Interventions: Pressure redistribution bed/mattress(bed type) Mobility Interventions: Pressure redistribution bed/mattress (bed type), HOB 30 degrees or less Nutrition Interventions: Discuss nutritional consult with provider Friction and Shear Interventions: Minimize layers, HOB 30 degrees or less, Apply protective barrier, creams and emollients

## 2019-01-12 NOTE — PROGRESS NOTES
Bedside report received from [facility]. Pt in bed resting, staff present infusing Rituxan. 2006  Shift assessment, medicated for pain. Scaterred bruises noted. 2324  PRN Tylenol given for pain. 0015  Pt resting quietly, eyes open spontaneously. Pt awake most part of the night and needing nurse to be in the room with him. 0345  Bedside report given to Dieter Mosqueda RN.

## 2019-01-13 NOTE — PROGRESS NOTES
Respond to code blue on flr - pt intubated during code ETT 23 lip 
-placed on vent:  AC VC+, Rate=15, Ud=461, Ycw7=395%, Peep=5 ABG Completed at 1528 PH=7.388, CO2=48, JK4=179, HCO3=28.9, UN6=646% --Fio2 weaned = 50%

## 2019-01-13 NOTE — PROGRESS NOTES
Problem: Falls - Risk of 
Goal: *Absence of Falls Document Valentín Quick Fall Risk and appropriate interventions in the flowsheet. Outcome: Progressing Towards Goal 
Fall Risk Interventions: 
Mobility Interventions: Communicate number of staff needed for ambulation/transfer, Utilize walker, cane, or other assistive device Mentation Interventions: Adequate sleep, hydration, pain control, Bed/chair exit alarm, Door open when patient unattended, Eyeglasses and hearing aids, More frequent rounding, Reorient patient, Room close to nurse's station Medication Interventions: Bed/chair exit alarm, Teach patient to arise slowly Elimination Interventions: Bed/chair exit alarm, Call light in reach, Patient to call for help with toileting needs, Urinal in reach, Toileting schedule/hourly rounds History of Falls Interventions: Bed/chair exit alarm, Door open when patient unattended, Room close to nurse's station

## 2019-01-13 NOTE — PROGRESS NOTES
0730:  Bedside and Verbal shift change report given to Jannette Armijo RN (oncoming nurse) by Tre Pennington RN (offgoing nurse). Report included the following information SBAR, Kardex, MAR and Recent Results. Patient on telemetry, IV saline locked, showing no s/s of pain/distress. Bed in lowest/locked position with call bell in reach. 5840:  Administered due medications, patient tolerated well. Aide at bedside assisting with repositioning patient. Patients daughter visiting. 
 
[phone]:  Administered due medications, patient tolerated well. Bed alarm activated. 1242:  Wife at nursing station asking for assistance with patient. Patient lying sideways in bed. This RN and two CNA repositioned patient to [facility]. Bed alarm reset. 1305:  Patient titus down 30's-40's, assessed patient, instructed CNA to start CPR, Herb Mcnamara overhead, Dr Ayesha Bennett on unit. 1310 Epi given 1312: Anesthesia at bedside 1313 Anesthesia reported- Sector 23 @ lip and tital; airway secure 1315 Epi given 1317: patient with pulse @ 117; Dr Ayesha Bennett discussing concerns of code status with wife in solarium 1323:  Patient transferred to ICU, family redirected to ICU waiting room with [employer].

## 2019-01-13 NOTE — PROGRESS NOTES
RENAL PROGRESS NOTE Kimmie Cerrato Assessment/Plan:  
 
  
1. SETH on CKD3: nonoliguric. Creat coming down. 2. HFrEF: Cardiorenal syndrome. Lasix 40 mg IV today. 3. ANCA: associated crescentic GN/microscopic polyangiitis. Continue Prednisone. S/p 3 doses of Rituximab. 4. HTN: BP in good range. 5. LV thrombus. risk of ac outweigh benefits, Coumadin stopped.  
6. NSTVT. On amiodarone. Declined life vest.   
7. L4 compression fracture. Ortho recs noted. 8. Rt thigh hematoma (spontaneous in a setting of ac). No need for surgery per ortho. 9. anemia. May need transfusions if < 7 Hb. On  epogen. TIW Subjective:Patient complaints of: No complaints. No SOB/CP/N/V. Patient Active Problem List  
Diagnosis Code  Acute exacerbation of CHF (congestive heart failure) ([facility]) I50.9  HCAP (healthcare-associated pneumonia) J18.9  LV (left ventricular) mural thrombus I51.3  Cardiomyopathy ([facility] [address] I42.9  Acute renal failure superimposed on stage 3 chronic kidney disease ([facility]) N17.9, N18.3  Aortic stenosis I35.0  Hypertension I10  
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  
 aspirin chewable tablet 81 mg  81 mg Oral DAILY Wandalee Plunk, NP   81 mg at 01/13/19 0332  clopidogrel (PLAVIX) tablet 75 mg  75 mg Oral DAILY Wandalee Plunk, NP   75 mg at 01/13/19 9849  epoetin brenda (EPOGEN;PROCRIT) injection 10,000 Units  10,000 Units SubCUTAneous Q TUE, THU & SAT Darline Zavala MD   10,000 Units at 01/12/19 0729  traMADol (ULTRAM) tablet 50 mg  50 mg Oral Q6H PRN Mallorie Ibarra H, DO   50 mg at 01/13/19 5015  predniSONE (DELTASONE) tablet 60 mg  60 mg Oral DAILY WITH BREAKFAST Kelly Olmos MD   60 mg at 01/13/19 5911  
 amiodarone (CORDARONE) tablet 400 mg  400 mg Oral DAILY Brad Joaquin MD   400 mg at 01/13/19 2896  trimethoprim-sulfamethoxazole (BACTRIM, SEPTRA)  mg per tablet 1 Tab  1 Tab Oral Q MON, WED & Antonio Ferraro MD   1 Tab at 01/11/19 1816  
 sodium chloride (NS) flush 5-10 mL  5-10 mL IntraVENous PRN Gael Benavides MD      
 atorvastatin (LIPITOR) tablet 40 mg  40 mg Oral DAILY Kendell Lovell MD   40 mg at 01/13/19 7006  docusate sodium (COLACE) capsule 100 mg  100 mg Oral BID Kendell Lovell MD   100 mg at 01/13/19 0295  ferrous sulfate tablet 325 mg  325 mg Oral ACB Kendell Lovell MD   325 mg at 01/13/19 8526  multivitamin, tx-iron-ca-min (THERA-M w/ IRON) tablet 1 Tab  1 Tab Oral DAILY Kendell Lovell MD   1 Tab at 01/13/19 0263  pantoprazole (PROTONIX) tablet 20 mg  20 mg Oral DAILY Kendell Lovell MD   20 mg at 01/13/19 0690  tamsulosin (FLOMAX) capsule 0.4 mg  0.4 mg Oral DAILY Kendell Lovell MD   0.4 mg at 01/13/19 5815  acetaminophen (TYLENOL) tablet 650 mg  650 mg Oral Q4H PRN Kendell Lovell MD   650 mg at 01/11/19 2324  ondansetron (ZOFRAN) injection 4 mg  4 mg IntraVENous Q4H PRN Kendell Lovell MD      
 albuterol-ipratropium (DUO-NEB) 2.5 MG-0.5 MG/3 ML  3 mL Nebulization Q4H PRN Pieter Cohen NP   3 mL at 01/03/19 1146 Objective Vitals:  
 01/12/19 2338 01/13/19 0401 01/13/19 0500 01/13/19 7031 BP: 140/82 120/64  104/63 Pulse: 77 76  (!) 109 Resp: 18 18  22 Temp: 97.4 °F (36.3 °C) 97.4 °F (36.3 °C) SpO2: 98% 93% Weight:   100.7 kg (222 lb 0.1 oz) Height:      
 
 
 
Intake/Output Summary (Last 24 hours) at 1/13/2019 1015 Last data filed at 1/13/2019 0362 Gross per 24 hour Intake 180 ml Output 815 ml  
 Net -635 ml Admission weight: Weight: 91.2 kg (201 lb) (01/02/19 2318) Last Weight Metrics: 
Weight Loss Metrics 1/13/2019 9/9/2018 Today's Wt 222 lb 0.1 oz 208 lb BMI 35.83 kg/m2 33.57 kg/m2 Physical Assessment:  
 
General: NAD, alert and oriented. Neck: No jvd. LUNGS: Clear to Auscultation, No rales, rhonchi or wheezes. CVS EXM: S1, S2  RRR, no murmurs/gallops/rubs. Abdomen: soft, non tender. Lower Extremities:  no edema. Lab CBC w/Diff Recent Labs  
  01/12/19 
0400 01/11/19 
0430 WBC 11.1 9.9  
RBC 2.68* 2.60* HGB 7.4* 7.2* HCT 24.7* 23.3*  
 146 Chemistry Recent Labs  
  01/13/19 
0522 01/12/19 
0400 01/11/19 
0430 * 125* 113*  145 144  
K 4.7 4.5 4.2  108 106 CO2 30 29 30 BUN 82* 82* 82* CREA 4.09* 4.16* 4.51* CA 8.2* 7.7* 7.8* AGAP 7 8 8 BUCR 20 20 18 ALB 2.6* 2.6* 2.4* PHOS 5.3* 5.6* 5.9* No results found for: IRON, FE, TIBC, IBCT, PSAT, FERR Lab Results Component Value Date/Time Calcium 8.2 (L) 01/13/2019 05:22 AM  
 Phosphorus 5.3 (H) 01/13/2019 05:22 Erica Montiel MD 
[facility] Pager: [phone]

## 2019-01-13 NOTE — PROGRESS NOTES
1345: Pt arrived on unit from [facility] code. Per report- pt titus then went asystole. Intubated during code. 1400: family on unit 1544: [facility] called. Advice to call back at time of death d/t the patient's age. 1800: Family updated. Pt code given to Khang medina. 1900: Bedside shift change report given to Mnoe Leo RN (oncoming nurse) by Jessenia Encarnacion RN (offgoing nurse). Report included the following information SBAR, Kardex, Accordion and Cardiac Rhythm NSR BBB.

## 2019-01-13 NOTE — ANESTHESIA PROCEDURE NOTES
Emergent Intubation Performed by: Silva Tolentino CRNA Authorized by: Silva Tolentino CRNA Emergent Intubation: Location:  ICU (3rd floor 3000, then transferred to ICU 2700) Date/Time:  1/13/2019 1:15 PM 
Indications:  Respiratory failure Spontaneous Ventilation: absent Level of Consciousness: unresponsive Preoxygenated: Yes (RT administering bag mask ventilation) Airway Documentation: Airway:  ETT - Cuffed Technique:  Direct laryngoscopy Insertion Site:  Oral 
Blade Type:  PocketFM LimitedleToughSurgery Medici Blade Size:  2 ETT size (mm):  7.5 ETT Line Caio:  Lips ETT Insertion depth (cm):  23 Placement verified by: EtCO2 Attempts:  1 Difficult airway: No   
Intubation Note Referring physician:  Dr. Andria Gomez Called to bedside secondary to  Code Blue, CPR compressions in progress. Patient pre-oxygenated with 100% oxygen Smooth RSI with no meds given, patient unresponsive DVL x 1, grade 1 view 7.5 [place] ETT taped and secured at 23 cm at the lips. 
 
+ Bilateral BS, + Chest rise, + ETCO2 
 
VSS. CXR pending. Confirmed after intubation that patient is Full Code Vin Ivan CRNA

## 2019-01-13 NOTE — PROGRESS NOTES
responded to a code blue today at 1310 in room 3009, Idalia Parsons 86 m. Code successful for now. Patient has an advance directive but wife and family wish to continue for a little bit to see if he responds further. Desi 3 Board Certified [employer] [facility]  
[phone]

## 2019-01-13 NOTE — PROGRESS NOTES
Problem: Falls - Risk of 
Goal: *Absence of Falls Document Cy Gresham Fall Risk and appropriate interventions in the flowsheet. Outcome: Progressing Towards Goal 
Fall Risk Interventions: 
Mobility Interventions: Communicate number of staff needed for ambulation/transfer, Utilize walker, cane, or other assistive device Mentation Interventions: Adequate sleep, hydration, pain control, Bed/chair exit alarm, Door open when patient unattended, Eyeglasses and hearing aids, More frequent rounding, Reorient patient, Room close to nurse's station Medication Interventions: Bed/chair exit alarm, Teach patient to arise slowly Elimination Interventions: Bed/chair exit alarm, Call light in reach, Patient to call for help with toileting needs, Urinal in reach, Toileting schedule/hourly rounds History of Falls Interventions: Bed/chair exit alarm, Door open when patient unattended, Room close to nurse's station

## 2019-01-13 NOTE — PROGRESS NOTES
Problem: Pressure Injury - Risk of 
Goal: *Prevention of pressure injury Document Luis Angel Scale and appropriate interventions in the flowsheet. Outcome: Progressing Towards Goal 
Pressure Injury Interventions: 
Sensory Interventions: Avoid rigorous massage over bony prominences, Maintain/enhance activity level, Pressure redistribution bed/mattress (bed type) Moisture Interventions: Absorbent underpads, Limit adult briefs, Maintain skin hydration (lotion/cream) Activity Interventions: Pressure redistribution bed/mattress(bed type) Mobility Interventions: HOB 30 degrees or less, Pressure redistribution bed/mattress (bed type), PT/OT evaluation Nutrition Interventions: Document food/fluid/supplement intake Friction and Shear Interventions: HOB 30 degrees or less

## 2019-01-13 NOTE — PROGRESS NOTES
Physical Exam  
Skin: Ecchymosis noted. There is erythema. Primary Nurse Jazmin Frye and Rohith baltazar RN performed a dual skin assessment on this patient No impairment noted Luis Angel score is 17

## 2019-01-13 NOTE — PROGRESS NOTES
Internal Medicine Progress Note Patient's Name: Kimmie Cerrato Admit Date: 1/2/2019 Length of Stay: 10 
 
 
Assessment/Plan Active Hospital Problems Diagnosis Date Noted  NSVT (nonsustained ventricular tachycardia) ([facility] 01/07/2019  LV (left ventricular) mural thrombus 01/04/2019  Cardiomyopathy ([facility] 01/04/2019  Acute renal failure superimposed on stage 3 chronic kidney disease ([facility] 01/04/2019  Aortic stenosis 01/04/2019  Hypertension 01/04/2019  ANCA-associated vasculitis ([facility] 01/04/2019  Acute exacerbation of CHF (congestive heart failure) ([facility] 01/03/2019 Monitor clinically Gentle diuresis Strict I&O Echo in am 
 
  
 HCAP (healthcare-associated pneumonia) 01/03/2019 HCAP Abx coverage with Zosyn, Levaquin and Vanco 
Nebs as needed O2 to maintain pulse ox >91% - Cr down, but not much from yesterday 
- s/p 3 doses of rituximab (last dose on 01/12) - Cont prednisone per nephro - BP in good range - No AC w/ coumadin as benefit outweighs risk given fall risk and large hematoma in leg given that lesion seen on echo likely calcified 
- Cont ASA/plavix 
- Hgb stable at 7.4 as of yest 
- Trend cadence and transfuse if < 7 
- Cont amio, pt declined life vest 
- PT/OT 
- Cont acceptable home medications for chronic conditions  
- DVT protocol I have personally reviewed all pertinent labs and films that have officially resulted over the last 24 hours. I have personally checked for all pending labs that are awaiting final results. Subjective Pt s/e @ bedside No major events overnight Doing well RLE hematoma better Denies CP or SOB Objective Visit Vitals /63 Pulse (!) 109 Temp 97.4 °F (36.3 °C) Resp 22 Ht 5' 6\" (1.676 m) Wt 100.7 kg (222 lb 0.1 oz) SpO2 93% BMI 35.83 kg/m² Physical Exam: 
General Appearance: NAD, conversant Lungs: Decreased BS B/L with minimal crackles CV: Tachy w/ RR, no m/r/g 
 Abdomen: soft, non-tender, normal bowel sounds Extremities: no cyanosis, RLE w/ large area of ecchymoses (fading), no TTP Neuro: No focal deficits, motor/sensory intact Lab/Data Reviewed: 
BMP:  
Lab Results Component Value Date/Time  01/13/2019 05:22 AM  
 K 4.7 01/13/2019 05:22 AM  
  01/13/2019 05:22 AM  
 CO2 30 01/13/2019 05:22 AM  
 AGAP 7 01/13/2019 05:22 AM  
  (H) 01/13/2019 05:22 AM  
 BUN 82 (H) 01/13/2019 05:22 AM  
 CREA 4.09 (H) 01/13/2019 05:22 AM  
 GFRAA 17 (L) 01/13/2019 05:22 AM  
 GFRNA 14 (L) 01/13/2019 05:22 AM  
 
CBC:  
No results found for: WBC, HGB, HGBEXT, HCT, HCTEXT, PLT, PLTEXT, HGBEXT, HCTEXT, PLTEXT Imaging Reviewed: 
No results found. Medications Reviewed: 
Current Facility-Administered Medications Medication Dose Route Frequency  LORazepam (ATIVAN) tablet 1 mg  1 mg Oral Q6H PRN  
 aspirin chewable tablet 81 mg  81 mg Oral DAILY  clopidogrel (PLAVIX) tablet 75 mg  75 mg Oral DAILY  epoetin brenda (EPOGEN;PROCRIT) injection 10,000 Units  10,000 Units SubCUTAneous Q TUE, THU & SAT  traMADol (ULTRAM) tablet 50 mg  50 mg Oral Q6H PRN  predniSONE (DELTASONE) tablet 60 mg  60 mg Oral DAILY WITH BREAKFAST  amiodarone (CORDARONE) tablet 400 mg  400 mg Oral DAILY  trimethoprim-sulfamethoxazole (BACTRIM, SEPTRA)  mg per tablet 1 Tab  1 Tab Oral Q MON, WED & FRI  sodium chloride (NS) flush 5-10 mL  5-10 mL IntraVENous PRN  
 atorvastatin (LIPITOR) tablet 40 mg  40 mg Oral DAILY  docusate sodium (COLACE) capsule 100 mg  100 mg Oral BID  ferrous sulfate tablet 325 mg  325 mg Oral ACB  multivitamin, tx-iron-ca-min (THERA-M w/ IRON) tablet 1 Tab  1 Tab Oral DAILY  pantoprazole (PROTONIX) tablet 20 mg  20 mg Oral DAILY  tamsulosin (FLOMAX) capsule 0.4 mg  0.4 mg Oral DAILY  acetaminophen (TYLENOL) tablet 650 mg  650 mg Oral Q4H PRN  
 ondansetron (ZOFRAN) injection 4 mg  4 mg IntraVENous Q4H PRN  
  albuterol-ipratropium (DUO-NEB) 2.5 MG-0.5 MG/3 ML  3 mL Nebulization Q4H PRN Latonia Jasso DO Internal Medicine, Hospitalist 
Pager: [phone] [facility]

## 2019-01-13 NOTE — ROUTINE PROCESS
1912: Assumed care. Sleeping in chair. On oxygen at 4 LPM. No discomfort noted at his time. Call light within reach. 2200: Awake. Confused. Reorieted patient to room & surroundings. Assisted patient back in bed. Late dinner offered. Consumed 75 %. 2338: No change from previous assessment. 8072: Medicated for pain per patient request. 
 
0230: Sleeping. 
 
0500: Incontinence care provided. 0631: Awake. Very anxious. Medicated for anxiety. 0630: Slept on & off thru night. Needs attended. 0730: Bedside and Verbal shift change report given to Brenda GIBSON (oncoming nurse) by me (offgoing nurse). Report included the following information SBAR, Kardex, Intake/Output, MAR and Recent Results.

## 2019-01-13 NOTE — PROGRESS NOTES
CODE BLUE NOTE: 
Responded to Code [place] Patient had Bradycardia down to Asystole. CPR was initiated and ACLS protocol was begun. Asystole protocol was performed with Epinephrine give for 2 rounds. Chest compressions were performed between Epinephrine Patient was intubated. ROSC was obtained after approximately 8 minutes. Care is being discussed with Family, they will regroup in ICU. He continues as FULL CODE for now. Discussed with Attending who will also meet with family.

## 2019-01-13 NOTE — PROGRESS NOTES
VENTILATOR Care plan 
 
Problem: Ventilator Management Goal: *Adequate oxygenation/ ventilation/ and extubation Patient: 
   
 
Juan Bagley     80 y.o.   male     1/13/2019  4:57 PM 
Patient Active Problem List  
Diagnosis Code  Acute exacerbation of CHF (congestive heart failure) ([facility]) I50.9  HCAP (healthcare-associated pneumonia) J18.9  LV (left ventricular) mural thrombus I51.3  Cardiomyopathy ([facility] [address] I42.9  Acute renal failure superimposed on stage 3 chronic kidney disease ([facility]) N17.9, N18.3  Aortic stenosis I35.0  Hypertension I10  
 ANCA-associated vasculitis ([facility]) I77.6  NSVT (nonsustained ventricular tachycardia) ([facility]) I47.2 HCAP (healthcare-associated pneumonia) Acute exacerbation of CHF (congestive heart failure) ([facility] [address] Reason patient intubated: Code blue Ventilator day: 1 ABG: 
Date:1/13/2019 Lab Results Component Value Date/Time PHI 7.388 01/13/2019 03:28 PM  
 PCO2I 48.0 (H) 01/13/2019 03:28 PM  
 PO2I 228 (H) 01/13/2019 03:28 PM  
 HCO3I 28.9 (H) 01/13/2019 03:28 PM  
 FIO2I 100 01/13/2019 03:28 PM  
 
 
Chest X-ray: 
Date:1/13/2019 Results from Hospital Encounter encounter on 01/02/19 XR CHEST PORT Narrative EXAM: Portable Chest 
 
CLINICAL INDICATION:  Tube Placement 
-Additional:  None COMPARISON:  01/02/19 TECHNIQUE: AP portable view at 1358 
 
______________ FINDINGS: 
 
HEART AND MEDIASTINUM:  The heart size is normal. The patient is status post a 
median sternotomy. The patient is rotated to the right LUNGS AND AIRWAYS:  Density is seen at the right lung base compatible with right 
pleural effusion and adjacent atelectasis/infiltrate PLEURA:  Right pleural effusion appears present BONES:  No acute or aggressive osseous lesions. OTHER:  Endotracheal catheter tip in the mid thoracic trachea at the level of 
the aortic arch 
 
______________ Impression IMPRESSION: 
 
Endotracheal tube in good position Right pleural effusion with right lung base atelectasis/infiltrate Lab Test: 
Date:1/13/2019 WBC:  
Lab Results Component Value Date/Time WBC 15.5 (H) 01/13/2019 02:39 PM  
HGB:  
Lab Results Component Value Date/Time HGB 7.3 (L) 01/13/2019 02:39 PM  
 PLTS:  
Lab Results Component Value Date/Time PLATELET 326 17/22/6248 02:39 PM  
 
 
SaO2%/flow: @SYOGPSP8(6)@ Vital Signs:    
Patient Vitals for the past 8 hrs: 
 Temp Pulse Resp BP SpO2  
01/13/19 1547  76 15  95 % 01/13/19 1530  74 15 150/73 100 % 01/13/19 1500  71 15 133/74 100 % 01/13/19 1445  74 15 148/67 100 % 01/13/19 1420 98.2 °F (36.8 °C) 79 17 128/58 100 % 01/13/19 1415  82 15 124/64 100 % 01/13/19 1410  84 15 128/68 100 % 01/13/19 1405  88 15 95/63 100 % 01/13/19 1400  91 15 (!) 85/56 100 % 01/13/19 1355  93 15 (!) 85/52 100 % 01/13/19 1354  94 15  100 % 01/13/19 1350  99 15 (!) 87/54 100 % 01/13/19 1345  (!) 104 20 102/62 100 % 01/13/19 1340 97 °F (36.1 °C) (!) 104 21 100/80 99 % 01/13/19 0933  (!) 109 22 104/63  Wean Screen Pass (Yes or No):no Wean Screen Reason for Failure:  
Duration of Weaning Trial: 
Additional Comments: PLAN OF CARE: ventilator support

## 2019-01-13 NOTE — CONSULTS
[facility] Pulmonary, Critical Care, and Sleep Medicine Name: Feliz Ochoa MRN: [id number] : 1932 Hospital: [facility] AMERICAN [facility] Date: 2019 [facility] Initial Patient Consult: 
                                           
Consult requesting physician: Shirley Davidson MD 
 
Reason for Consult: ICU care I have reviewed the flowsheet and notes. Events, vitals, medications and notes from last 24 hours reviewed. Care plan discussed with staff History of Present Illness: 
 
Feliz Ochoa has been seen and evaluated in ICU/ER. Patient is a 80 y.o. M with PMHx significant for Severe CHF (EF 15%), CKD (ANCA associated vasculitis), HTN, Aortic Stenosis, and LV thrombus who initially presented on  for CHF exacerbation. He was given diuretics and started on ABX for PNA. Echo showed EF of 15% which was down from prior. He was seen by cardiology and started on CHF meds and amiodarone. He was offered a life vest but declined. 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 He has been on rituximab and prednisone. On  he developed a large spontaneous hematoma on the R thigh. His AC was stopped. This afternoon he became bradycardic and had a cardiac arrest on the floor. Rhythm was PEA and he responded to 2 cycles of compressions and epi. On my assessment he is intubated and responsive. Pupils small and minimally response. He has no spontaneous movement or withdrawal to pain. He has a cough reflex and is passively breathing on the ventilator. BP are in the 34V systolic. The patient is critically ill and can not provide additional history due to Unconsciousness. History taken from chart. IMPRESSION and PLAN: 
Patient Active Problem List  
Diagnosis Code  Acute exacerbation of CHF (congestive heart failure) ([facility]) I50.9  HCAP (healthcare-associated pneumonia) J18.9  LV (left ventricular) mural thrombus I51.3  Cardiomyopathy ([facility] [address] I42.9  Acute renal failure superimposed on stage 3 chronic kidney disease (HCC) N17.9, N18.3  Aortic stenosis I35.0  Hypertension I10  
 ANCA-associated vasculitis ([facility]) I77.6  NSVT (nonsustained ventricular tachycardia) ([facility]) I47.2 Assessment: 
Cardiac Arrest 
- due to end stage heart failure Severe CHF 
- EF 15%, LV and RV dysfunction 
- refused life vest 
Acute Respiratory Failure 
- intubated 1/13 during cardiac arrest 
Encephalopathy 
- 2/2 cardiac arrest, unclear if hypoxic injury occurred LV thrombus 
- no AC due to thigh hematoma R Thigh Hematoma 
- spontaneous with AC, improving Crescentric GN 
- biopsy proven 
- on rituxan and pred CKD Emphysema Interstitial Lung Disease 
- mild fibrosis seen in 2010 
- recent CT abdomen with worsening fibrotic changes - possibly ANCA related? R pleural effusion Multiple large R basilar pulmonary nodules Impression/Plan: - Devastating neuro exam at this point - Will get ABG and CXR- adjust vent accordingly 
- keep off sedation, propofol if agitated 
- Start levophed for BP 
- CBC, CMP, lactic acid 
- PT/INR 
- Continue amiodarone to IV, methylpred to IV 
- Supportive care, family discussion Code status: Full Code OTHER: 
Glycemic Control. Glucose stabilizer per ICU protocol when on insulin drip. Maintain blood glucose 140-180. Replace electrolytes per ICU electrolyte replacement protocol Ventilator bundle & Sedation protocol followed. Daily morning sedation holiday, assessment for readiness for SBT & weaning from ventilator; and then re-titrate if required. Aim to keep peak plateau pressure 92-81GN H2O in ARDS patient. [place] tube to suction at 20-30 cm H2O, Maintain [place] tube with 5-10ml air every 4 hours. Chlorhexidine mouth washes and routine oral care every 4 hours. Stress ulcer and DVT prophylaxis. HOB >=30 degree elevation all the time. HOB >=30 degree elevation all the time. Aggressive pulmonary toileting. Incentive spirometry when appropriate. Aspiration precautions. Sepsis bundle and protocol followed. Follow serial lactic acid when >2, fluid resuscitation. Draw cultures before antibiotic. Follow cultures. Antibiotic choice. Administer antibiotic within 1 hr ICU admission and 3 hours of ED arrival. Deescalate antibiotic when appropriate. Vasopressor when appropriate with MAP goal >65 mmHg. Central Line bundle followed, remove when not needed. Large bore IV line or CVP when appropriate. Shepard bundle followed, remove shepard catheter when not critically ill. Skin & Wound care. PT/OT eval and treat. OOB/IS when appropriate. Further recommendations will be based on the patient's response to recommended treatment and results of the investigation ordered. Quality Care: Stress ulcer prophylaxis, DVT prophylaxis, HOB elevated, Infection control all reviewed and addressed. Events and notes from last 24 hours reviewed. Care plan discussed with nursing. D/w patient and family: above medical problems and answered all questions to their satisfaction. See my orders for detail. CC TIME: >60 min Further management depending on test results and work up as outlined above. Review of Systems: 
Review of systems not obtained due to patient factors. Medication Reviewed Allergies Allergen Reactions  Protamine Anaphylaxis Past Medical History:  
Diagnosis Date  A-fib ([facility] [address]  ANCA-associated vasculitis ([facility] [address]  BPH (benign prostatic hyperplasia)  Cardiomyopathy ([facility] [address] LVEF 15% (12/18)  CKD (chronic kidney disease) stage 3, GFR 30-59 ml/min ([facility])  Crescentic glomerulonephritis   
 biopsy 12/31/2018  DM (diabetes mellitus) ([facility] [address]  Hypercholesteremia  Hypertension  PAD (peripheral artery disease) ([facility] [address]  PFO (patent foramen ovale)   
 closure of a patent foramen ovale by Dr. Valentine Page 4/17/09.  Pulmonary fibrosis ([facility] [address]  S/P AVR (aortic valve replacement) 2009  
  23 mm Reggie-Diaz porcine valve Past Surgical History:  
Procedure Laterality Date  CARDIAC SURG PROCEDURE UNLIST  HX CATARACT REMOVAL    
 HX ORTHOPAEDIC    
 HX SHOULDER REPLACEMENT Social History Tobacco Use  Smoking status: Former Smoker  Smokeless tobacco: Never Used Substance Use Topics  Alcohol use: Yes History reviewed. No pertinent family history. Prior to Admission medications Medication Sig Start Date End Date Taking? Authorizing Provider  
amiodarone (PACERONE) 400 mg tablet Take 1 Tab by mouth daily. 1/8/19  Yes Ortega Pereyra NP  
isosorbide dinitrate (ISORDIL) 5 mg tablet Take 1 Tab by mouth two (2) times a day. 1/7/19  Yes Ortega Pereyra NP  
predniSONE (DELTASONE) 20 mg tablet Take 20 mg by mouth daily (with breakfast). 1/10/19  Yes Ortega Pereyra NP  
traMADol (ULTRAM) 50 mg tablet Take 1 Tab by mouth every six (6) hours as needed. Max Daily Amount: 200 mg. 1/7/19  Yes Ortega Pereyra NP  
furosemide (LASIX) 40 mg tablet Take 1 tab daily 1/6/19  Yes Alyssa Rogers MD  
warfarin (COUMADIN) 5 mg tablet Take 1 Tab by mouth every evening. 1/5/19  Yes Alyssa Rogers MD  
metoprolol tartrate (LOPRESSOR) 25 mg tablet Take 1 Tab by mouth every twelve (12) hours. 1/5/19  Yes Alyssa Rogers MD  
trimethoprim-sulfamethoxazole (BACTRIM, SEPTRA)  mg per tablet Take 1 Tab by mouth every Monday, Wednesday, Friday. 1/7/19  Yes Alyssa Rogers MD  
aspirin delayed-release 81 mg tablet Take 81 mg by mouth daily. Yes Slade, MD Cyndi  
atorvastatin (LIPITOR) 40 mg tablet Take 40 mg by mouth daily. Yes Slade, MD Cyndi  
multivitamin, tx-iron-ca-min (THERA-M W/ IRON) 9 mg iron-400 mcg tab tablet Take 1 Tab by mouth daily. Yes Slade, MD Cyndi  
docusate sodium (COLACE) 100 mg capsule Take 100 mg by mouth two (2) times a day.    Yes Cyndi June MD  
 Omeprazole delayed release (PRILOSEC D/R) 20 mg tablet Take 20 mg by mouth daily. Yes Slade, MD Cyndi  
tamsulosin (FLOMAX) 0.4 mg capsule Take 0.4 mg by mouth daily. Yes Cyndi June MD  
furosemide (LASIX) 20 mg tablet Take  by mouth daily. Yes Slade, MD Cyndi  
carvedilol (COREG) 3.125 mg tablet Take  by mouth two (2) times daily (with meals). Yes Slade, MD Cyndi  
ferrous sulfate 325 mg (65 mg iron) tablet Take 325 mg by mouth Daily (before breakfast). Slade, MD Cyndi  
 
Current Facility-Administered Medications Medication Dose Route Frequency  NOREPINephrine (LEVOPHED) 8 mg in 0.9% NS 250ml infusion  2-16 mcg/min IntraVENous TITRATE  propofol (DIPRIVAN) infusion  5-50 mcg/kg/min IntraVENous TITRATE  aspirin chewable tablet 81 mg  81 mg Oral DAILY  clopidogrel (PLAVIX) tablet 75 mg  75 mg Oral DAILY  epoetin brenda (EPOGEN;PROCRIT) injection 10,000 Units  10,000 Units SubCUTAneous Q TUE, THU & SAT  predniSONE (DELTASONE) tablet 60 mg  60 mg Oral DAILY WITH BREAKFAST  amiodarone (CORDARONE) tablet 400 mg  400 mg Oral DAILY  trimethoprim-sulfamethoxazole (BACTRIM, SEPTRA)  mg per tablet 1 Tab  1 Tab Oral Q MON, WED & FRI  atorvastatin (LIPITOR) tablet 40 mg  40 mg Oral DAILY  docusate sodium (COLACE) capsule 100 mg  100 mg Oral BID  ferrous sulfate tablet 325 mg  325 mg Oral ACB  multivitamin, tx-iron-ca-min (THERA-M w/ IRON) tablet 1 Tab  1 Tab Oral DAILY  pantoprazole (PROTONIX) tablet 20 mg  20 mg Oral DAILY  tamsulosin (FLOMAX) capsule 0.4 mg  0.4 mg Oral DAILY Lines: All central lines examined by me. No signs of erythema, induration, discharge. Feeding Tube:                    
Peripheral Intravenous Line: 
Peripheral IV 01/07/19 Anterior; Inferior;Left;Lower Arm (Active) Site Assessment Clean, dry, & intact 1/13/2019  9:22 AM  
Phlebitis Assessment 0 1/13/2019  9:22 AM  
Infiltration Assessment 0 1/13/2019  9:22 AM  
 Dressing Status Clean, dry, & intact 2019  9:22 AM  
Dressing Type Tape;Transparent 2019  7:12 PM  
Hub Color/Line Status Flushed;Patent; End cap changed 2019  5:00 AM  
Action Taken Open ports on tubing capped 2019  3:26 PM  
Alcohol Cap Used Yes 2019  5:00 AM  
   
Peripheral IV 19 Left Forearm (Active) Site Assessment Clean, dry, & intact 2019  9:22 AM  
Phlebitis Assessment 0 2019  9:22 AM  
Infiltration Assessment 0 2019  9:22 AM  
Dressing Status Clean, dry, & intact 2019  9:22 AM  
Dressing Type Tape;Transparent 2019  7:12 PM  
Hub Color/Line Status Flushed;Patent; End cap changed 2019  5:00 AM  
Action Taken Open ports on tubing capped 2019  3:26 PM  
Alcohol Cap Used Yes 2019  5:00 AM  
 
Airway: Airway - Continuous Aspiration of Subglottic Secretions (POLLY) Tube 19 Oral (Active) Insertion Depth (cm) 23 cm 2019  1:54 PM  
Line Caio Lips 2019  1:54 PM  
Side Secured Centered 2019  1:54 PM  
Site Assessment Clean, dry, & intact 2019  1:54 PM  
 
 
Objective: 
Vital Signs:   
Visit Vitals /63 Pulse 94 Temp 97.4 °F (36.3 °C) Resp 15 Ht 5' 6\" (1.676 m) Wt 100.7 kg (222 lb 0.1 oz) SpO2 100% BMI 35.83 kg/m² O2 Device: Nasal cannula O2 Flow Rate (L/min): 4 l/min Temp (24hrs), Av.7 °F (36.5 °C), Min:97.4 °F (36.3 °C), Max:98.2 °F (36.8 °C) Intake/Output:  
Last shift:       07 -  1900 In: -  
Out: 991 [BXLUS:394] Intake/Output Summary (Last 24 hours) at 2019 1410 Last data filed at 2019 1069 Gross per 24 hour Intake 180 ml Output 815 ml Net -635 ml Ventilator Settings: 
Mode Rate Tidal Volume Pressure FiO2 PEEP Assist control, VC+   500 ml    100 % 5 cm H20 Peak airway pressure: 26 cm H2O Plateau pressure:   
Tidal volume:  
Minute ventilation: 7.5 l/min SPO2  
 
ARDS network Guidelines: Lung protective strategy and Plateau pressure goals less than or equal to 30 Physical Exam:  
General/Neurology:Intubated, unresponsive. No spontaneous movement or w/d to pain. + cough, no gag. Head:   Normocephalic, without obvious abnormality Eye:   Pupils small and minimally reactive, EOM intact, no scleral icterus, no pallor Oral:   Mucus membranes moist, ETT Neck:   Supple Lung:   B/l air entry is fair, no wheezing or rales Heart:   Click heard, soft HS, normocardic, S1 S2 present. Abdomen: Soft, non tender, BS+nt Extremities:  No pedal edema, Soft healing R thigh hematoma Skin:   Dry, intact Data: 
   
Recent Results (from the past 24 hour(s)) RENAL FUNCTION PANEL Collection Time: 01/13/19  5:22 AM  
Result Value Ref Range Sodium 144 136 - 145 mmol/L Potassium 4.7 3.5 - 5.5 mmol/L Chloride 107 100 - 108 mmol/L  
 CO2 30 21 - 32 mmol/L Anion gap 7 3.0 - 18 mmol/L Glucose 132 (H) 74 - 99 mg/dL BUN 82 (H) 7.0 - 18 MG/DL Creatinine 4.09 (H) 0.6 - 1.3 MG/DL  
 BUN/Creatinine ratio 20 12 - 20 GFR est AA 17 (L) >60 ml/min/1.73m2 GFR est non-AA 14 (L) >60 ml/min/1.73m2 Calcium 8.2 (L) 8.5 - 10.1 MG/DL Phosphorus 5.3 (H) 2.5 - 4.9 MG/DL Albumin 2.6 (L) 3.4 - 5.0 g/dL EKG, 12 LEAD, INITIAL Collection Time: 01/13/19  2:05 PM  
Result Value Ref Range Ventricular Rate 93 BPM  
 Atrial Rate 93 BPM  
 P-R Interval 190 ms QRS Duration 162 ms Q-T Interval 424 ms QTC Calculation (Bezet) 527 ms Calculated P Axis 14 degrees Calculated R Axis 70 degrees Calculated T Axis -58 degrees Diagnosis Sinus rhythm with sinus arrhythmia with occasional premature ventricular  
complexes Left bundle branch block Abnormal ECG When compared with ECG of 04-JAN-2019 15:29, 
Questionable change in QRS axis T wave inversion more evident in Inferior leads Chemistry Recent Labs  
  01/13/19 
0522 01/12/19 
0400 01/11/19 
0430 * 125* 113*  145 144  
K 4.7 4.5 4.2  108 106 CO2 30 29 30 BUN 82* 82* 82* CREA 4.09* 4.16* 4.51* CA 8.2* 7.7* 7.8* PHOS 5.3* 5.6* 5.9* AGAP 7 8 8 BUCR 20 20 18 ALB 2.6* 2.6* 2.4* CBC w/Diff Recent Labs  
  01/12/19 
0400 01/11/19 
0430 WBC 11.1 9.9  
RBC 2.68* 2.60* HGB 7.4* 7.2* HCT 24.7* 23.3*  
 146 ABG  No results for input(s): PHI, PHI, POC2, PCO2I, PO2, PO2I, HCO3, HCO3I, FIO2, FIO2I in the last 72 hours. Micro   No results for input(s): SDES, CULT in the last 72 hours. No results for input(s): CULT in the last 72 hours. CT (Most Recent) Results from Hospital Encounter encounter on 01/02/19 CT ABD PELV WO CONT Addendum Addendum:   Described in the body of the report but not in the impression is abnormal  linear hypodensity within the anteroinferior aspect of L4 vertebral body  suggesting compression fracture with possible localized osteonecrotic cavity. Only  minimal height loss is seen. This finding is age-indeterminate but may be  acute/subacute given visualized cortical buckling. No definite additional paraspinal  stranding to further suggest recent age. Clinical correlation with possible lobe  back pain is recommended. MR lumbar spine would better determine age of this  fracture. Rajat Magdaleno MD 1/7/2019  1:52 PM        
 Narrative EXAM: CT ABDOMEN AND PELVIS (NON-CONTRAST) INDICATION: Left flank pain, history of recent left renal biopsy 12/31/2018 COMPARISON: No prior study available for comparison, review of reports located 
in [place] Everywhere\" section of electronic medical record of CT-guided left 
renal biopsy 12/31/2018 and additional CT chest abdomen pelvis also from 12/31/2018 (images unavailable for review). TECHNIQUE: Axial CT imaging of the abdomen and pelvis was performed without IV 
or oral contrast as per specific clinician request.  Multiplanar reformats were 
generated.   One or more dose reduction techniques were used on this CT: 
 automated exposure control, adjustment of the mAs and/or kVp according to 
patient's size, and iterative reconstruction techniques. The specific techniques 
utilized on this CT exam have been documented in the patient's electronic 
medical record. Digital Imaging and Communications in Medicine (DICOM) format 
image data are available to nonaffiliated external healthcare facilities or 
entities on a secure, media free, reciprocally searchable basis with patient authorization for at least a 12-month period after this study. _______________ FINDINGS: 
 
The lack of oral and IV contrast limits evaluation of the solid organs and 
bowel. LOWER CHEST: Right-sided pleural effusion is present. There is some nodular 
density components along its posterolateral aspect, such as axial image 23, 
measuring up to 15 mm in diameter. There may be additional nodular pleural 
lesions along the diaphragm and fissure. A lateral right infrahilar nodular 
lesion is present axial image 6 measuring approximately 2.5 cm in size. Additional mild amount of atelectasis is present in the right lower lobe 
adjacent to the pleural effusion. Linear and subpleural densities are present 
bilaterally, left greater than right along with groundglass densities, 
nonspecific perhaps scarring or atelectasis. Of note, this right-sided pleural 
effusion along with pleural nodularity and infrahilar nodularity is described on 
prior outside study as well. Partially visualized aortic valve replacement is seen. No pericardial or left 
pleural effusion is present. LIVER, BILIARY: Liver is unremarkable. No abnormal biliary dilation. Gallbladder 
is unremarkable. PANCREAS: Unremarkable. SPLEEN: Unremarkable. ADRENALS: Unremarkable. KIDNEYS: Nonspecific mild perinephric stranding is present. Renal vascular 
calcifications are seen bilaterally. There is no hydronephrosis or hydroureter. No definite nephroureterolithiasis is present. No perinephric hematoma is seen. Small lateral left interpolar 8 mm cortical hypodense lesion is present, with Hounsfield units suggesting cysts. LYMPH NODES: No enlarged abdominopelvic lymph nodes by size criteria. GASTROINTESTINAL TRACT: The lack of oral contrast limits bowel evaluation. No 
abnormal bowel dilation or wall thickening. Appendix is within normal limits. PELVIC ORGANS: Prostate contains nonspecific central calcification. Urinary 
bladder is unremarkable. VASCULATURE: Atherosclerotic calcifications are present. OSSEOUS: Degenerative changes are seen in the spine. Irregular slightly 
horizontally oriented hypodensity is present along the anteroinferior aspect of 
the L4 vertebral body with suggestion of anterior cortical buckling. No definite 
adjacent paravertebral stranding is seen. OTHER: There is abnormal enlargement of the visualized proximal right lower 
extremity/thigh muscles, particularly the pectineus muscle and to a lesser 
degree the obturator externus muscle and proximal quadriceps femoris muscle. The 
pectineus muscle is distended with lobulated internal hyperdensity, which is 
partially visualized but measures at least 6.8 x 2.4 cm cross-sectional 
diameter, but extending inferiorly out of the field-of-view. There is 
surrounding stranding and ill-defined hypodensity along the muscular fascial 
planes. The adjacent right femoral vessels are unremarkable without hematoma. 
 
_______________ Impression IMPRESSION: 
 
1. No evidence of nephric/perinephric hematoma. 2. Partially visualized lobulated intramuscular hypodense lesion involving the 
proximal right lower extremity muscles greatest within the pectoralis muscle. Differential diagnosis primarily includes intramuscular hematoma (patient 
reportedly anticoagulated) or intramuscular abscess. Similar findings can be produced by myonecrosis or rhabdomyolysis in the appropriate setting (diabetes, 
prolonged unresponsiveness/found down) etc. Clinical correlation is recommended. Further right lower extremity cross-sectional imaging, CT or MRI, would better 
evaluate the extent of this muscular abnormality, and MRI would likely better 
differentiate between hematoma versus abscess, if clinically uncertain. 3. Right pleural effusion with multiple pleural based nodules, also described on 
prior outside CT chest abdomen pelvis 12/31/2018 (images not available for 
review). None of these thoracic findings were present on review of a more remote CT chest dated 06/22/2010. These are indeterminate but may represent pleural 
carcinomatosis. Does patient have known primary carcinoma/lung carcinoma? As 
described on prior outside report, potentially PET/CT may be helpful to evaluate 
for abnormal metabolic activity. These unexpected results of impression #2 were directly communicated to Dr. El Dos Santos  on 1/7/2019 at approximately 11:25 AM.  Results understood and 
acknowledged. Delay in dictation secondary to PACS IT failure. XR (Most Recent). CXR reviewed by me and compared with previous CXR Results from Hospital Encounter encounter on 01/02/19 XR CHEST PORT Narrative Portable Chest   
 
History: Shortness of breath Comparison: PA and lateral chest 7/20/2010 Portable view of the chest demonstrates stable cardiomediastinal silhouette. The 
patient has previously undergone median sternotomy . The most superior median 
sternotomy wire is fractured, unchanged. Cardiac monitoring leads are present. There now is abnormal indistinct interstitial thickening present which has 
perihilar and basilar predominance. Some additional alveolar opacities present 
both lung bases. Both costophrenic angles are blunted. No pneumothorax is seen. Degenerative changes are in the spine.   
  
 Impression IMPRESSION: 
 
 Findings suspicious for pulmonary edema superimposed upon background chronic 
interstitial lung disease. Suspected additional bibasilar superimposed alveolar 
component although pneumonia is difficult to exclude. Probable small pleural 
effusions. Clinical correlation recommended. Suggest follow-up after treatment 
to demonstrate resolution. High complexity decision making was performed during the evaluation of this patient at high risk for decompensation with multiple organ involvement Above mentioned total time spent on reviewing the case/medical record/data/notes/EMR/patient examination/documentation/coordinating care with nurse/consultants, exclusive of procedures with complex decision making performed and > 50% time spent in face to face evaluation. Jayla Mar MD 
1/13/2019

## 2019-01-14 PROBLEM — I46.9 CARDIAC ARREST (HCC): Status: ACTIVE | Noted: 2019-01-01

## 2019-01-14 PROBLEM — J96.00 ACUTE RESPIRATORY FAILURE (HCC): Status: ACTIVE | Noted: 2019-01-01

## 2019-01-14 PROBLEM — S70.11XA HEMATOMA OF RIGHT THIGH: Status: ACTIVE | Noted: 2019-01-01

## 2019-01-14 NOTE — PROGRESS NOTES
Pt with change in status & transfer to ICU. Pt is now intubated. Currently not appropriate for mobilization with skilled therapy. Will discontinue current OT orders; please re-order when appropriate. Jaimee Varela MS OTR/L Office Ext: [phone] Pager: [phone]

## 2019-01-14 NOTE — MANAGEMENT PLAN
Discharge/Transition Planning Spoke with daughter and wife of pt. Pt getting dilaudid gtt started and comfort extubation completed. S/P arrest from yesterday. Offered support and gave contact info. Care Management following Casie Person RN BSN Outcomes Manager Pager # [phone]

## 2019-01-14 NOTE — PROGRESS NOTES
attempted to conduct a Follow-up Visit and Spiritual Assessment  for Anat Hamm, who is a 80 y.o.,male. Patient is on ventilator following a Code Blue. Wife and daughters at bedside.  offered spiritual support and assurance of prayer. Since family already called for a , [employer] will defer to 's visit, but left cell phone number in case of need [phone]. Patient's chart reviewed. Chaplains will continue to follow and provide pastoral care as needed or requested. The Rev. [address] Spiritual Care Services [address] [facility] [facility] [phone] / [facility] [phone]

## 2019-01-14 NOTE — PROGRESS NOTES
Patient in bed on back with head elevated - BBS equal and ess clear - ETt secure at 23 at the lips and moved to the right side of the mouth - patient suctioned and POLLY tube cleared - MVB at [facility] - vent alarms on and functional 
 
0041 - bite block placed with roseline adverse effects noted 0454 - ABG obtained - no changes in vent settings at this time

## 2019-01-14 NOTE — PROGRESS NOTES
1900: Received pt resting in bed from off going nurse Bina Bermudez, [facility]. Bed in lowest position, side rails raised x3, Dual Neuro check completed and drips verified at this time. Will continue to monitor. 2230: Propofol turned off for accurate Neuro check. Pt alert, following some commands, purposeful movement of all extremities. Tia Ferraro paged for restraint order. Tia Ferraro called back to put in order. 0230: Pt results came back with Hgb= 6.9. 1 unit Packed RBC's ordered by . 
 
0700: 1 unit packed RBC's started. Bedside and Verbal shift change report given to [place] (oncoming nurse) by Nicky [place] 
 (offgoing nurse). Report included the following information SBAR, Kardex, ED Summary, Intake/Output, MAR, Recent Results, Cardiac Rhythm NSR and Alarm Parameters .

## 2019-01-14 NOTE — PROGRESS NOTES
conducted a Follow-up consultation and Spiritual Assessment for Chris Barrios, who is a 80 y.o.,male. Patients Primary Language is: [place]. According to the patients EMR Yazidism Affiliation is: Advent. The reason the Patient came to the hospital is:  
Patient Active Problem List  
 Diagnosis Date Noted  Cardiac arrest ([facility] 01/14/2019  Hematoma of right thigh 01/14/2019  Acute respiratory failure ([facility] 01/14/2019  
 NSVT (nonsustained ventricular tachycardia) ([facility] 01/07/2019  LV (left ventricular) mural thrombus 01/04/2019  Cardiomyopathy ([facility] 01/04/2019  Acute renal failure superimposed on stage 3 chronic kidney disease ([facility] 01/04/2019  Aortic stenosis 01/04/2019  Hypertension 01/04/2019  ANCA-associated vasculitis ([facility] 01/04/2019  Acute exacerbation of CHF (congestive heart failure) ([facility] 01/03/2019  HCAP (healthcare-associated pneumonia) 01/03/2019 The patient was on a ventilator and was not responsive.  left his cell phone number to patient's daughter Goldie Marie in case of need. Goldie Lay called and said that the  contacted would not be able to make it.  returned to patient to administer the [place]. There were many family members surrounding the patient during the anointing. Wife Charis Ward, daughters Ang Cole, a brother, other family members.  explained the significance of the sacrament, offered spiritual support, and assurance of prayer for patient. The family expressed gratitude for the visit. Chaplains are to make follow-up visits as requested or needed. The Rev. [address] Spiritual Care Services [address] [facility] [facility] [phone] / [facility] [phone]

## 2019-01-14 NOTE — PROGRESS NOTES
RENAL PROGRESS NOTE Ayla Mohs Impression: 1. MPO AAV 2. Severe Cardiomyopathy 3. S/p PEA 4. Anemia 5. Acute resp failure. 6. Thigh Hematoma. 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 His EF is 15% and also has LV clot. Creatinine has been going up, but lately stable, despite PEA arrest yesterday and UOP is reasonable. No overt volume overload. Electrolytes are acceptable. No indication for RRT. He is in MOF and overall prognosis is poor. DW with her daughter in detail about the renal prognosis and overall prognosis. If renal functions continue to worsen, he does not seem to be a good dialysis candidate sp with EF of 15%. She, after consultation with her mom and sister, wants to move towards comfort measures, awaiting sister's arrival,  which seems most reasonable. I will let attending physician know. Plan:                                                                                                                           
Subjective: 
Unable to obtain Patient Active Problem List  
Diagnosis Code  Acute exacerbation of CHF (congestive heart failure) ([facility]) I50.9  HCAP (healthcare-associated pneumonia) J18.9  LV (left ventricular) mural thrombus I51.3  Cardiomyopathy ([facility] I42.9  Acute renal failure superimposed on stage 3 chronic kidney disease ([facility]) N17.9, N18.3  Aortic stenosis I35.0  Hypertension I10  
 ANCA-associated vasculitis ([facility]) I77.6  NSVT (nonsustained ventricular tachycardia) ([facility]) I47.2  Cardiac arrest ([facility] I46.9  Hematoma of right thigh S70.11XA  Acute respiratory failure ([facility]) J96.00 Current Facility-Administered Medications Medication Dose Route Frequency Provider Last Rate Last Dose  
 0.9% sodium chloride infusion 250 mL  250 mL IntraVENous PRN Ernie Rueda MD      
 NOREPINephrine (LEVOPHED) 8 mg in 0.9% NS 250ml infusion  2-16 mcg/min IntraVENous TITRATE Zandra Zhou MD   Stopped at 01/14/19 9759  propofol (DIPRIVAN) infusion  5-50 mcg/kg/min IntraVENous TITRATE Zandra Zhou MD 18.1 mL/hr at 01/14/19 0733 30 mcg/kg/min at 01/14/19 0733  
 amiodarone (CORDARONE) injection 300 mg  300 mg IntraVENous DAILY Julianne Longoria MD      
 methylPREDNISolone (PF) (SOLU-MEDROL) injection 50 mg  50 mg IntraVENous DAILY Znadra Zhou MD   50 mg at 01/13/19 1526  cefTRIAXone (ROCEPHIN) 2 g in 0.9% sodium chloride (MBP/ADV) 50 mL MBP  2 g IntraVENous Q24H Tonny MUJICA  mL/hr at 01/13/19 2050 2 g at 01/13/19 2050  LORazepam (ATIVAN) tablet 1 mg  1 mg Oral Q6H PRN Bety Calkin H, DO   1 mg at 01/13/19 1122  aspirin chewable tablet 81 mg  81 mg Oral DAILY Raysa Mcdonnell, NP   81 mg at 01/13/19 7729  clopidogrel (PLAVIX) tablet 75 mg  75 mg Oral DAILY [facility] Kecia, NP   75 mg at 01/13/19 7863  epoetin brenda (EPOGEN;PROCRIT) injection 10,000 Units  10,000 Units SubCUTAneous Q TUE, THU & SAT Noris Reina MD   10,000 Units at 01/12/19 1717  traMADol (ULTRAM) tablet 50 mg  50 mg Oral Q6H PRN Bety Calkin H, DO   50 mg at 01/13/19 1015  trimethoprim-sulfamethoxazole (BACTRIM, SEPTRA)  mg per tablet 1 Tab  1 Tab Oral Q MON, WED & Tamra Emmanuel MD   1 Tab at 01/11/19 1816  
 sodium chloride (NS) flush 5-10 mL  5-10 mL IntraVENous PRN Sherice Hdez MD      
 atorvastatin (LIPITOR) tablet 40 mg  40 mg Oral DAILY Kendell Lovell MD   40 mg at 01/13/19 2878  docusate sodium (COLACE) capsule 100 mg  100 mg Oral BID Meme Grover MD   Stopped at 01/13/19 1800  ferrous sulfate tablet 325 mg  325 mg Oral ACB Kendell Lovell MD   325 mg at 01/13/19 5797  multivitamin, tx-iron-ca-min (THERA-M w/ IRON) tablet 1 Tab  1 Tab Oral DAILY Kendell Lovell MD   1 Tab at 01/13/19 2859  pantoprazole (PROTONIX) tablet 20 mg  20 mg Oral DAILY Kendell Lovell MD   20 mg at 01/13/19 1163  tamsulosin (FLOMAX) capsule 0.4 mg  0.4 mg Oral DAILY Kendell Lovell MD   0.4 mg at 01/13/19 9790  acetaminophen (TYLENOL) tablet 650 mg  650 mg Oral Q4H PRN Kendell Lovell MD   650 mg at 01/11/19 2324  ondansetron (ZOFRAN) injection 4 mg  4 mg IntraVENous Q4H PRN Kendell Lovell MD      
 albuterol-ipratropium (DUO-NEB) 2.5 MG-0.5 MG/3 ML  3 mL Nebulization Q4H PRN Tod Jurado, NP   3 mL at 01/03/19 1146 Objective Vitals:  
 01/14/19 0700 01/14/19 0715 01/14/19 0730 01/14/19 0820 BP: 118/55 130/59 138/52 Pulse: 65 61 86 60 Resp: 15 15 20 15 Temp: 98.5 °F (36.9 °C) 98.5 °F (36.9 °C) 98.7 °F (37.1 °C) SpO2: 98% 100% 100% 98% Weight: 98.9 kg (218 lb 0.6 oz) Height:      
 
 
 
Intake/Output Summary (Last 24 hours) at 1/14/2019 0910 Last data filed at 1/14/2019 0700 Gross per 24 hour Intake 346.24 ml Output 1255 ml Net -908.76 ml Admission weight: Weight: 91.2 kg (201 lb) (01/02/19 2318) Last Weight Metrics: 
Weight Loss Metrics 1/14/2019 9/9/2018 Today's Wt 218 lb 0.6 oz 208 lb BMI 35.19 kg/m2 33.57 kg/m2 Physical Assessment:  
 
General: Intubated Neck: No jvd. LUNGS: Clear to Auscultation, No rales, rhonchi or wheezes. CVS EXM: S1, S2  RRR No rub. Abdomen: soft, non tender. Lower Extremities:  Rt thigh ecchymosis +. Trace edema b/l Access: None Lab CBC w/Diff Recent Labs  
  01/14/19 
0230 01/13/19 
1439 01/12/19 
0400 WBC 12.3 15.5* 11.1 RBC 2.51* 2.64* 2.68* HGB 6.9* 7.3* 7.4* HCT 22.4* 24.4* 24.7*  
 205 157 GRANS  --  95*  --   
LYMPH  --  3*  --   
EOS  --  0  -- Chemistry Recent Labs  
  01/14/19 
0230 01/13/19 
1439 01/13/19 
0522 * 240* 132* * 144 144  
K 4.4 4.6 4.7 * 107 107 CO2 29 28 30 BUN 84* 86* 82* CREA 4.20* 4.28* 4.09* CA 7.9* 7.9* 8.2* AGAP 8 9 7 BUCR 20 20 20 AP  --  52  --   
TP  --  5.4*  --   
ALB 2.4* 2.5* 2.6*  
GLOB  --  2.9  --   
 AGRAT  --  0.9  --   
PHOS 3.0  --  5.3* No results found for: IRON, FE, TIBC, IBCT, PSAT, FERR Lab Results Component Value Date/Time Calcium 7.9 (L) 01/14/2019 02:30 AM  
 Phosphorus 3.0 01/14/2019 02:30 Kenith Boast, MD 
9:10 AM 
1/14/2019

## 2019-01-14 NOTE — PROGRESS NOTES
VENTILATOR Care plan 
 
Problem: Ventilator Management Goal: *Adequate oxygenation/ ventilation/ and extubation Patient: 
   
 
Charles Oliver     80 y.o.   male     1/14/2019  9:55 AM 
Patient Active Problem List  
Diagnosis Code  Acute exacerbation of CHF (congestive heart failure) ([facility]) I50.9  HCAP (healthcare-associated pneumonia) J18.9  LV (left ventricular) mural thrombus I51.3  Cardiomyopathy ([facility] I42.9  Acute renal failure superimposed on stage 3 chronic kidney disease ([facility]) N17.9, N18.3  Aortic stenosis I35.0  Hypertension I10  
 ANCA-associated vasculitis ([facility]) I77.6  NSVT (nonsustained ventricular tachycardia) ([facility]) I47.2  Cardiac arrest ([facility] I46.9  Hematoma of right thigh S70.11XA  Acute respiratory failure ([facility]) J96.00  
 
 
HCAP (healthcare-associated pneumonia) Acute exacerbation of CHF (congestive heart failure) ([facility] Reason patient intubated: CHF Ventilator day:2 Ventilator settings: ACVC+ rate-15, VT-500, PEEP-5, FIO2-50% ETT Size/Placement:7.5 ETT, secured 23cm at lip ABG: 
Date:1/14/2019 Lab Results Component Value Date/Time PHI 7.459 (H) 01/14/2019 04:54 AM  
 PCO2I 40.7 01/14/2019 04:54 AM  
 PO2I 96 01/14/2019 04:54 AM  
 HCO3I 28.9 (H) 01/14/2019 04:54 AM  
 FIO2I 50 01/14/2019 04:54 AM  
 
 
Chest X-ray: 
Date:1/14/2019 Results from Hospital Encounter encounter on 01/02/19 XR CHEST PORT Narrative EXAM: XR CHEST PORT 
 
CLINICAL INDICATION/HISTORY: ETT placement 
-Additional: Intubated, follow-up airspace disease COMPARISON: 01/13/2019, 01/02/2019 TECHNIQUE: Frontal view of the chest 
 
_______________ FINDINGS: SUPPORT LINES AND TUBES: 
  > Endotracheal tube tip in satisfactory position, 5 cm above the shaylee HEART AND MEDIASTINUM: Prior median sternotomy and CABG with right rotation. Persistent prominent cardiac silhouette. Rotation degradation prevents adequate 
evaluation of the hilar structures. LUNGS AND PLEURAL SPACES: Right mid to lower lung hazy confluence with 
peripheral and basilar increased density suggestive of a combination of airspace 
disease and effusion. Confluent left diaphragmatic opacity likely representing 
combination of airspace disease and small effusion. Bilateral Persistent diffuse interstitial prominence, not significantly changed 
to prior studies. No pneumothorax. BONY THORAX AND SOFT TISSUES: Stable and intact 
 
_______________ Impression IMPRESSION: 
 
1. Satisfactory position of ET tube. 2. No significant change in the bilateral airspace disease, right greater than 
left with findings of interstitial edema and right greater than left effusions. Lab Test: 
Date:1/14/2019 WBC:  
Lab Results Component Value Date/Time WBC 12.3 01/14/2019 02:30 AM  
HGB:  
Lab Results Component Value Date/Time HGB 6.9 (L) 01/14/2019 02:30 AM  
 PLTS:  
Lab Results Component Value Date/Time PLATELET 344 61/39/4113 02:30 AM  
 
 
SaO2%/flow: @ZNKBGLP9(2)@ Vital Signs:    
Patient Vitals for the past 8 hrs: 
 Temp Pulse Resp BP SpO2  
01/14/19 0900  (!) 55 15 110/50 96 % 01/14/19 0845  (!) 57 15 104/50 96 % 01/14/19 0830  60 15 109/50 96 % 01/14/19 0820  60 15  98 % 01/14/19 0815  (!) 58 15 111/50 97 % 01/14/19 0800 99 °F (37.2 °C) 62 15 111/48 97 % 01/14/19 0745  65 17 122/53 97 % 01/14/19 0730 98.7 °F (37.1 °C) 86 20 138/52 100 % 01/14/19 0715 98.5 °F (36.9 °C) 61 15 130/59 100 % 01/14/19 0700 98.5 °F (36.9 °C) 65 15 118/55 98 % 01/14/19 0645 98.2 °F (36.8 °C) 66 15 99/54 97 % 01/14/19 0600  72 15 109/54 97 % 01/14/19 0500  71 15 114/56 96 % 01/14/19 0455  69 15  97 % 01/14/19 0400 97.9 °F (36.6 °C) 69 15 108/59 98 % 01/14/19 0300  68 15 109/59 97 % 01/14/19 0200  70 15 110/55 97 % Wean Screen Pass (Yes or No):Yes Wean Screen Reason for Failure:n/a Duration of Weaning Trial: 
 
Additional Comments: PLAN OF CARE: Maintain ventilatory support GOAL: Adequate oxygenation and ventilation OUTCOME:

## 2019-01-14 NOTE — PROGRESS NOTES
[facility] - Progress Note Admit Date: 1/2/2019 I saw, evaluated, interviewed and examined the patient personally. I agree with the findings and plan of care as documented below with PA-C note Event noted from yesterday. Appears ? ? Bradycardia and asystole with ROSC after 8-9 minutes. Now intubated DW daughter at bedside about overall cardiac condition. Per daughter, his father would have never wanted this aggressive measures. Family is considering DNR status and even comfort care. This was confirmed with nurse and now plan for compassionate extubation and comfort care. Will be available as needed. Please call us back with questions. Murali Clark MD 
 
 
Assessment: - S/p cardiac arrest 01/13/19: titus and asystole with ROSC - Acute on chronic CHF 
- Echo 01/03/19 with EF <15%, LV global hypokinesis, small calcified thrombus in LV (fixed and located in apex). Porcine bioprosthetic aortic valve. - Echo 12/22/18 with EF 15% - Hx of cardiomyopathy with echocardiogram 2/13/09 demonstrating an EF of 35% with  
improvement of ejection fraction to 45% by echocardiography post aortic valve  
replacement about March 2010.  
- Cardiac catheterization 4/2/09 demonstrating severe aortic stenosis with mild coronary disease, 50% stenosis of the posterolateral OM  
- CKD III 
- Recent hospital admission Dec 2018 with poss TIA with transient vision loss, seen by neuro and vascular surgery, deemed likely ischemic 
- CT chest done 12/24/18 with multiple pulmonary and pleural based nodules suspicious for neoplasm. It was recommended to pursue PET scan at d/c 
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 
- Hx of Severe aortic stenosis s/p aortic valve replacement with a 23 mm Reggie-Diaz porcine valve and closure of a patent foramen ovale 4/17/09.  
- Hypertension  
- Diabetes Mellitus - Remote hx tobacco use - NSVT on tele monitor. 8-12 beats-- declined LifeVest this admission Plan:  
 
- Cardiology re-consulted after arrest, Dr. Odalis Leyva discussed case with patient's daughter this AM, and he is now DNR.  
- Critical care management currently underway per ICU team and respective consultants - No plans for invasive workup at this time given renal failure and other significant co-morbidities, guarded prognosis regarding neurological recovery. - Would stop IV Amiodarone and change to PO route if able to take meds via NGT Subjective: Intubated and sedated Objective:  
  
Patient Vitals for the past 8 hrs: 
 Temp Pulse Resp BP SpO2  
01/14/19 0900  (!) 55 15 110/50 96 % 01/14/19 0845  (!) 57 15 104/50 96 % 01/14/19 0830  60 15 109/50 96 % 01/14/19 0820  60 15  98 % 01/14/19 0815  (!) 58 15 111/50 97 % 01/14/19 0800 99 °F (37.2 °C) 62 15 111/48 97 % 01/14/19 0745  65 17 122/53 97 % 01/14/19 0730 98.7 °F (37.1 °C) 86 20 138/52 100 % 01/14/19 0715 98.5 °F (36.9 °C) 61 15 130/59 100 % 01/14/19 0700 98.5 °F (36.9 °C) 65 15 118/55 98 % 01/14/19 0645 98.2 °F (36.8 °C) 66 15 99/54 97 % 01/14/19 0600  72 15 109/54 97 % 01/14/19 0500  71 15 114/56 96 % 01/14/19 0455  69 15  97 % 01/14/19 0400 97.9 °F (36.6 °C) 69 15 108/59 98 % Patient Vitals for the past 96 hrs: 
 Weight 01/14/19 0700 218 lb 0.6 oz (98.9 kg) 01/13/19 0500 222 lb 0.1 oz (100.7 kg) 01/12/19 0452 219 lb 12.8 oz (99.7 kg) 01/11/19 0317 214 lb (97.1 kg) Intake/Output Summary (Last 24 hours) at 1/14/2019 1132 Last data filed at 1/14/2019 1100 Gross per 24 hour Intake 702.2 ml Output 1255 ml Net -552.8 ml Physical Exam: 
General:  Intubated and sedated Neck:  nontender, no JVD Lungs:  clear to auscultation bilaterally Heart:  regular rate and rhythm, S1, S2 normal, no murmur, click, rub or gallop Abdomen:  abdomen is soft without significant tenderness, masses, organomegaly or guarding Extremities:  edema 2+ bilateral LE's Data Review:  
 
Labs: Results:  
   
Chemistry Recent Labs  
  01/14/19 
0230 01/13/19 
1439 01/13/19 
0522 01/12/19 
0400 * 240* 132* 125* * 144 144 145  
K 4.4 4.6 4.7 4.5  
* 107 107 108 CO2 29 28 30 29 BUN 84* 86* 82* 82* CREA 4.20* 4.28* 4.09* 4.16* CA 7.9* 7.9* 8.2* 7.7* MG 2.7* 2.7*  --   --   
PHOS 3.0  --  5.3* 5.6* AGAP 8 9 7 8 BUCR 20 20 20 20 AP  --  52  --   --   
TP  --  5.4*  --   --   
ALB 2.4* 2.5* 2.6* 2.6*  
GLOB  --  2.9  --   --   
AGRAT  --  0.9  --   --   
  
CBC w/Diff Recent Labs  
  01/14/19 
0230 01/13/19 
1439 01/12/19 
0400 WBC 12.3 15.5* 11.1 RBC 2.51* 2.64* 2.68* HGB 6.9* 7.3* 7.4* HCT 22.4* 24.4* 24.7*  
 205 157 GRANS  --  95*  --   
LYMPH  --  3*  --   
EOS  --  0  --   
  
Cardiac Enzymes No results found for: CPK, CK, CKMMB, CKMB, RCK3, CKMBT, CKNDX, CKND1, CHRISTO, TROPT, TROIQ, GONZÁLEZ, TROPT, TNIPOC, BNP, BNPP Coagulation Recent Labs  
  01/13/19 
1537 01/12/19 
0400 PTP 14.3 14.6 INR 1.1 1.2 Lipid Panel No results found for: CHOL, CHOLPOCT, CHOLX, CHLST, CHOLV, 710406, HDL, LDL, LDLC, DLDLP, 490293, VLDLC, VLDL, TGLX, TRIGL, TRIGP, TGLPOCT, CHHD, CHHDX  
BNP No results found for: BNP, BNPP, XBNPT Liver Enzymes Recent Labs  
  01/14/19 
0230 01/13/19 
1439 TP  --  5.4* ALB 2.4* 2.5* AP  --  52 SGOT  --  77* Digoxin Thyroid Studies No results found for: T4, T3U, TSH, TSHEXT Signed By: Aracelis White. Seamus Dangelo PA-C January 14, 2019

## 2019-01-14 NOTE — PROGRESS NOTES
Problem: Falls - Risk of 
Goal: *Absence of Falls Document [place] Rogue Fall Risk and appropriate interventions in the flowsheet. Outcome: Progressing Towards Goal 
Fall Risk Interventions: 
Mobility Interventions: Bed/chair exit alarm, Strengthening exercises (ROM-active/passive) Mentation Interventions: Adequate sleep, hydration, pain control, More frequent rounding Medication Interventions: Bed/chair exit alarm Elimination Interventions: Bed/chair exit alarm History of Falls Interventions: Bed/chair exit alarm Problem: Falls - Risk of 
Goal: *Absence of Falls Document [place] Rogue Fall Risk and appropriate interventions in the flowsheet. Fall Risk Interventions: 
Mobility Interventions: Bed/chair exit alarm, Strengthening exercises (ROM-active/passive) Mentation Interventions: Adequate sleep, hydration, pain control, More frequent rounding Medication Interventions: Bed/chair exit alarm Elimination Interventions: Bed/chair exit alarm History of Falls Interventions: Bed/chair exit alarm

## 2019-01-14 NOTE — PROGRESS NOTES
Daughter, Fe Larsen has asked not to have OG tube placed. Family in conference considering making Mr. Elvia Simmons a DNR per 2 daughters & spouse

## 2019-01-14 NOTE — PROGRESS NOTES
-0815-Received patient on ventilator ACVC+ rate-15, VT-500,PEEP-5, FIO2-50%. K3Cfps-900% HR-63, RR-15. ETT noted to be patent and secure. Pt. Is currently receiving PRBC's Respiratory will continue to monitor. -[facility] 
 
-1525-Pt. Compassionately extubated and placed on O2 via nasal cannula at 6lpm. [facility]

## 2019-01-14 NOTE — PROGRESS NOTES
7534: Per chart review, patient intubated following code blue on 1/13. Also with low H.H (6.9/22.4). Will d/c orders at this time. Please re-order when patient medically appropriate for participation with PT. Thank you for this referral, Rhonda Sandhu PT, DPT Office extension: [phone] Pager #: [phone]

## 2019-01-14 NOTE — PROGRESS NOTES
7 [facility] Hospitalist Division Inpatient Daily Progress Note Daily progress Note Patient: Hawa Medina MRN: [id number]  [facility]: [id number] YOB: 1932  Age: 80 y.o. 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 Oxygen sats 88% on RA and was placed on oxygen with some relief. 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 Pt has been on high dose oral Prednisone and so far has received two doses of Rituximab (last dose on 1/12019). CT chest note pulmonary lesions which are felt to be due to vasculitis vs mets. Work up in the ED - CXR findings suspicious for pulmonary edema superimposed on chronic interstitial lung disease, suspected additional bibasilar superimposed alveolar component although pneumonia is difficult to exclude, probable small pleural effusions. Troponin 0.09 - -> 0.11 - -> 0.11, creatinine 2.48 and BNP [id number] in ED. Pt started on IV diuretics this admission and admitted for further management. Echo obtained 1/3 showed EF < 15%, LV hypertrophy, LV global hypokinesis, small echogenic and calcified thrombus LV, porcine bioprosthetic aortic valve, trace mitral valve regurgitation. Cardiology consulted. Pt started on Heparin gtt and oral Coumadin.   Creatinine increased to 3 on 1/4, nephrology consulted given recent biopsy findings and increasing creatinine given diuresis. Nephrology recommends continue oral prednisone, pt's next dose of Rituximab was supposed to be on 1/8 but will hold until pneumonia is treated. Pt started on Bactrim for PCP prophylaxis. Ok to resume Lasix per nephrology. Lasix restarted. Patient was ok to go home 1/5 but required 02 and delayed dc in addition life vest was ordered but patient refused. Heparin gtt dc'd on 1/7 as INR 2.3- as recommended per cardiology notes. Acute weakness this am, pt unable to sit up in bed and PT unable to work with pt- he was ambulatory over weekend. Pt c/o left lateral thigh pain and numbness inner right thigh. CT abd/pelvis w/o contrast showed lobulated intramuscular hypodense lesion involving RLE muscles greatest in pectineus muscle, right pleural effusion w/ multiple pleural based nodules, abnormal linear hypodensity within anteroinferior aspect of L4 vertebral body suggesting compression fracture w/ possible localized osteonecrotic cavity. Findings discussed with pt and daughter- plan for MRI L spine and right thigh- pending. Pt has agreed to SNF-  following and matching to facilities. Pt will not be discharged. Official read from MRI still pending however radiologist left note in chart from MRI L spine note acute fracture inferior endplate L4. MRI right femur 10 cm hemorrhagic fluid collection, right adductor longus muscle w/ moderate surrounding perilesional intramuscular and intermuscular medial right thigh hemorrhage or edema. Ortho consulted- no clinical evidence of compartment syndrome, symptoms more related to L4 compression fracture- continue prednisone and supplement with lumbar corset- f/u in office with Dr. Júnior Llanos- consider epidural steroid injections. INR up to 3.7 today- Coumadin dc'd- discussed with cardiology- risks outweighs benefit at this time, LV thrombus calcified on Echo, thus Coumadin will not be continued- pt to start on ASA/Plavix once INR < 2. Creatinine up to 4. Plan for 3rd infusion of Rituximab tomorrow. [facility] pt for Lumbar corset. Planning for d/c to [facility] when medically stable. INR down to 1.3 on 1/10- plan to start ASA and Plavix on 1/11. Creatinine continues to increase- defer to nephrology for management. Note downward trend of creatinine. S/p Rituximab infusion 1/11. Started on ASA/Plavix, Hgb stable. Creatinine trending down. Pt is s/p cardiopulmonary arrest on 1/13- pt became bradycardic then PEA arrest, he responded to two cycles of compressions and epinephrine. Pt intubated and transferred to ICU- no spontaneous movement or withdrawal to pain. UA 1/13 with moderate leukocyte esterase, 3+ bacteria, WBC 11 to 20. Urine culture pending. CXR 1/13 note right pleural effusion w/ right lung base atelectasis/infiltrate. Family wishes to make pt DNR. Family meeting with hospitalist service and PCCM- family wishes to proceed with compassionate extubation- states pt would never want this. Comfort orders placed. Assessment/Plan:  
 
Patient Active Problem List  
Diagnosis Code  Acute exacerbation of CHF (congestive heart failure) ([facility]) I50.9  HCAP (healthcare-associated pneumonia) J18.9  LV (left ventricular) mural thrombus I51.3  Cardiomyopathy ([facility] [address] I42.9  Acute renal failure superimposed on stage 3 chronic kidney disease ([facility]) N17.9, N18.3  Aortic stenosis I35.0  Hypertension I10  
 ANCA-associated vasculitis ([facility]) I77.6  NSVT (nonsustained ventricular tachycardia) ([facility]) I47.2 A/P: 
Cardiac Arrest / Acute respiratory failure 
- bradycardia then PEA, intubated on 1/13 
- PCCM following 
- VAPP bundle, daily CXR/ABG while intubated, sedation holiday - Levophed- keep SBP > 90 mmHg - pt wishes for DNR, compassionate extubation Acute Exacerbation of CHF /Cardiomyopathy 
- cardiology following- BNP [id number] on admission - Lasix held due to increasing creatinine,  
- Echo 1/3 with EF < 15%, LV hypertrophy, LV global hypokinesis, small echogenic and calcified thrombus LV, porcine bioprosthetic aortic valve, trace mitral valve regurgitation 
- monitor I/O's - Patient refuses life vest 
 
Acute fracture inferior end plate L4 
- pt with acute weakness, inability to walk or sit up in bed despite assistance with PT 
- denies falls, bowel/bladder incontinence - MRI L spine showed acute fracture inferior endplate L4 
- ortho consulted- continue prednisone and supplement with lumbar corset 
- f/u outpatient with Dr. Viraj Moraes, consider epidural steroid injections HCAP 
- completed IV abx 
- started on Bactrim per nephrology for PCP prophylaxis 
- supplemental oxygen, duo-nebs, prednisone daily LV Thrombus 
- noted on Echo this admission 
- Heparin gtt- dc'd 1/7 
- Coumadin dc'd 1/9 due to supratherapeutic INR- Vitamin K 10 mg x 1 today 
- discussed with cardiology- risks outweigh benefit, Coumadin will not be continued at this time since LV thrombus appeared calcified on Echo 
- plan to start ASA/Plavix 1/12 NSVT 
- cardiology following - Amiodarone 300 mg IV daily Hx of aortic stenosis and aortic valve replacement 
- started on Coumadin 1/3 
- INR up to 3.7 - Coumadin dc'd- risks outweigh benefit, thus Coumadin will not be continued 
- cardiology following Acute on chronic renal failure superimposed on CKD 3 
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 
- continue Prednisone 60 mg daily - pt received two doses of of Rituximab (last dose on 1/12019), next dose today 1/11 
- monitor I/O's, BMP Pulmonary Nodules 
-CT chest done 12/24/18 with multiple pulmonary and pleural based nodules suspicious for neoplasm 
- was recommended to pursue PET scan at d/c Hypertension 
- continue home meds DVT Prophylaxis 
- supratherapeutic INR, Coumadin dc'd 
 - start on ASA/Plavix 1/12 Comfort measures initiated 1/14 Navin Jerez, CAIO-CHELSEA [phone] Pager:  [phone] Office:  [phone] Subjective: Interval notes reviewed. Family wishes to proceed with compassionate extubation. Objective:  
  
Visit Vitals /52 Pulse 86 Temp 98.7 °F (37.1 °C) Resp 20 Ht 5' 6\" (1.676 m) Wt 98.9 kg (218 lb 0.6 oz) SpO2 100% BMI 35.19 kg/m² Physical Exam: 
General appearance: sedated on propofol, intubated on ventilator Head: Normocephalic, without obvious abnormality, atraumatic Lungs: clear to auscultation bilaterally Heart: bradycardic, S1, S2 normal, no murmur, click, rub or gallop Abdomen: soft, non tender, non distended. Normoactive bowel sounds. Extremities: extremities normal, atraumatic, no cyanosis, trace edema BLE Skin: scattered bruising BUE, erythema/bruising noted to right inner thigh down to knee, also bruising noted to posterior right thigh- improving- RLE soft to touch Neurologic: intubated, sedated PSY: intubated sedated Intake and Output: 
Current Shift:  No intake/output data recorded. Last three shifts:  01/12 1901 - 01/14 0700 In: 526.2 [P.O.:180; I.V.:346.2] Out: 1870 [[id number]] Recent Results (from the past 24 hour(s)) EKG, 12 LEAD, INITIAL Collection Time: 01/13/19  2:05 PM  
Result Value Ref Range Ventricular Rate 93 BPM  
 Atrial Rate 93 BPM  
 P-R Interval 190 ms QRS Duration 162 ms Q-T Interval 424 ms QTC Calculation (Bezet) 527 ms Calculated P Axis 14 degrees Calculated R Axis 70 degrees Calculated T Axis -58 degrees Diagnosis Sinus rhythm with sinus arrhythmia with occasional premature ventricular  
complexes Left bundle branch block Abnormal ECG When compared with ECG of 04-JAN-2019 15:29, 
Questionable change in QRS axis T wave inversion more evident in Inferior leads METABOLIC PANEL, COMPREHENSIVE Collection Time: 01/13/19  2:39 PM  
Result Value Ref Range Sodium 144 136 - 145 mmol/L Potassium 4.6 3.5 - 5.5 mmol/L Chloride 107 100 - 108 mmol/L  
 CO2 28 21 - 32 mmol/L Anion gap 9 3.0 - 18 mmol/L Glucose 240 (H) 74 - 99 mg/dL BUN 86 (H) 7.0 - 18 MG/DL Creatinine 4.28 (H) 0.6 - 1.3 MG/DL  
 BUN/Creatinine ratio 20 12 - 20 GFR est AA 16 (L) >60 ml/min/1.73m2 GFR est non-AA 13 (L) >60 ml/min/1.73m2 Calcium 7.9 (L) 8.5 - 10.1 MG/DL Bilirubin, total 0.7 0.2 - 1.0 MG/DL  
 ALT (SGPT) 85 (H) 16 - 61 U/L  
 AST (SGOT) 77 (H) 15 - 37 U/L Alk. phosphatase 52 45 - 117 U/L Protein, total 5.4 (L) 6.4 - 8.2 g/dL Albumin 2.5 (L) 3.4 - 5.0 g/dL Globulin 2.9 2.0 - 4.0 g/dL A-G Ratio 0.9 0.8 - 1.7 LACTIC ACID Collection Time: 01/13/19  2:39 PM  
Result Value Ref Range Lactic acid 1.6 0.4 - 2.0 MMOL/L  
MAGNESIUM Collection Time: 01/13/19  2:39 PM  
Result Value Ref Range Magnesium 2.7 (H) 1.6 - 2.6 mg/dL CBC WITH AUTOMATED DIFF Collection Time: 01/13/19  2:39 PM  
Result Value Ref Range WBC 15.5 (H) 4.6 - 13.2 K/uL  
 RBC 2.64 (L) 4.70 - 5.50 M/uL HGB 7.3 (L) 13.0 - 16.0 g/dL HCT 24.4 (L) 36.0 - 48.0 % MCV 92.4 74.0 - 97.0 FL  
 MCH 27.7 24.0 - 34.0 PG  
 MCHC 29.9 (L) 31.0 - 37.0 g/dL  
 RDW 14.9 (H) 11.6 - 14.5 % PLATELET 064 355 - 739 K/uL MPV 10.8 9.2 - 11.8 FL  
 NEUTROPHILS 95 (H) 40 - 73 % LYMPHOCYTES 3 (L) 21 - 52 % MONOCYTES 2 (L) 3 - 10 % EOSINOPHILS 0 0 - 5 % BASOPHILS 0 0 - 2 %  
 ABS. NEUTROPHILS 14.7 (H) 1.8 - 8.0 K/UL  
 ABS. LYMPHOCYTES 0.4 (L) 0.9 - 3.6 K/UL  
 ABS. MONOCYTES 0.3 0.05 - 1.2 K/UL  
 ABS. EOSINOPHILS 0.0 0.0 - 0.4 K/UL  
 ABS. BASOPHILS 0.0 0.0 - 0.1 K/UL  
 DF AUTOMATED URINALYSIS W/ RFLX MICROSCOPIC Collection Time: 01/13/19  2:42 PM  
Result Value Ref Range Color YELLOW Appearance TURBID  Specific gravity 1.016 1.005 - 1.030    
 pH (UA) 5.0 5.0 - 8.0 Protein 100 (A) NEG mg/dL Glucose NEGATIVE  NEG mg/dL Ketone NEGATIVE  NEG mg/dL Bilirubin NEGATIVE  NEG Blood LARGE (A) NEG Urobilinogen 0.2 0.2 - 1.0 EU/dL Nitrites NEGATIVE  NEG Leukocyte Esterase MODERATE (A) NEG URINE MICROSCOPIC ONLY Collection Time: 01/13/19  2:42 PM  
Result Value Ref Range WBC 11 to 20 0 - 4 /hpf  
 RBC 35 to 40 0 - 5 /hpf Epithelial cells 1+ 0 - 5 /lpf Bacteria 3+ (A) NEG /hpf Amorphous Crystals 1+ (A) NEG  
POC G3 Collection Time: 01/13/19  3:28 PM  
Result Value Ref Range Device: VENT    
 FIO2 (POC) 100 % pH (POC) 7.388 7.35 - 7.45    
 pCO2 (POC) 48.0 (H) 35.0 - 45.0 MMHG  
 pO2 (POC) 228 (H) 80 - 100 MMHG  
 HCO3 (POC) 28.9 (H) 22 - 26 MMOL/L  
 sO2 (POC) 100 (H) 92 - 97 % Base excess (POC) 4 mmol/L Mode ASSIST CONTROL Tidal volume 500 ml Set Rate 16 bpm  
 PEEP/CPAP (POC) 5 cmH2O  
 PIP (POC) 28 Allens test (POC) N/A Inspiratory Time 0.9 sec Total resp. rate 15 Site RIGHT RADIAL Specimen type (POC) ARTERIAL Performed by Lopez Farrell Volume control plus YES    
PROTHROMBIN TIME + INR Collection Time: 01/13/19  3:37 PM  
Result Value Ref Range Prothrombin time 14.3 11.5 - 15.2 sec INR 1.1 0.8 - 1.2 RENAL FUNCTION PANEL Collection Time: 01/14/19  2:30 AM  
Result Value Ref Range Sodium 146 (H) 136 - 145 mmol/L Potassium 4.4 3.5 - 5.5 mmol/L Chloride 109 (H) 100 - 108 mmol/L  
 CO2 29 21 - 32 mmol/L Anion gap 8 3.0 - 18 mmol/L Glucose 154 (H) 74 - 99 mg/dL BUN 84 (H) 7.0 - 18 MG/DL Creatinine 4.20 (H) 0.6 - 1.3 MG/DL  
 BUN/Creatinine ratio 20 12 - 20 GFR est AA 16 (L) >60 ml/min/1.73m2 GFR est non-AA 14 (L) >60 ml/min/1.73m2 Calcium 7.9 (L) 8.5 - 10.1 MG/DL Phosphorus 3.0 2.5 - 4.9 MG/DL Albumin 2.4 (L) 3.4 - 5.0 g/dL CBC W/O DIFF Collection Time: 01/14/19  2:30 AM  
Result Value Ref Range WBC 12.3 4.6 - 13.2 K/uL  
 RBC 2.51 (L) 4.70 - 5.50 M/uL HGB 6.9 (L) 13.0 - 16.0 g/dL HCT 22.4 (L) 36.0 - 48.0 % MCV 89.2 74.0 - 97.0 FL  
 MCH 27.5 24.0 - 34.0 PG  
 MCHC 30.8 (L) 31.0 - 37.0 g/dL  
 RDW 14.8 (H) 11.6 - 14.5 % PLATELET 243 398 - 990 K/uL MPV 11.0 9.2 - 11.8 FL  
CALCIUM, IONIZED Collection Time: 01/14/19  2:30 AM  
Result Value Ref Range Ionized Calcium 1.05 (L) 1.12 - 1.32 MMOL/L  
MAGNESIUM Collection Time: 01/14/19  2:30 AM  
Result Value Ref Range Magnesium 2.7 (H) 1.6 - 2.6 mg/dL TYPE + CROSSMATCH Collection Time: 01/14/19  4:20 AM  
Result Value Ref Range Crossmatch Expiration 01/17/2019 ABO/Rh(D) B POSITIVE Antibody screen NEG   
 CALLED TO: [facility] UNIT ON 01/14/2019 BY [facility] Unit number [id number] Blood component type RC LR Unit division 00 Status of unit ISSUED Crossmatch result Compatible POC G3 Collection Time: 01/14/19  4:54 AM  
Result Value Ref Range Device: VENT    
 FIO2 (POC) 50 % pH (POC) 7.459 (H) 7.35 - 7.45    
 pCO2 (POC) 40.7 35.0 - 45.0 MMHG  
 pO2 (POC) 96 80 - 100 MMHG  
 HCO3 (POC) 28.9 (H) 22 - 26 MMOL/L  
 sO2 (POC) 98 (H) 92 - 97 % Base excess (POC) 5 mmol/L Mode ASSIST CONTROL Tidal volume 500 ml Set Rate 15 bpm  
 PEEP/CPAP (POC) 5 cmH2O  
 PIP (POC) 26 Allens test (POC) YES Inspiratory Time 0.9 sec Total resp. rate 15 Site LEFT RADIAL Specimen type (POC) ARTERIAL Performed by Guicho Espinoza Volume control plus YES Lab Results Component Value Date/Time Glucose 154 (H) 01/14/2019 02:30 AM  
 Glucose 240 (H) 01/13/2019 02:39 PM  
 Glucose 132 (H) 01/13/2019 05:22 AM  
 Glucose 125 (H) 01/12/2019 04:00 AM  
 Glucose 113 (H) 01/11/2019 04:30 AM  
  
 
Imaging: Xr Chest [facility] Result Date: 1/13/2019 EXAM: Portable Chest CLINICAL INDICATION:  Tube Placement -Additional: None COMPARISON:  01/02/19 TECHNIQUE: AP portable view at 5434 ______________ FINDINGS: HEART AND MEDIASTINUM:  The heart size is normal. The patient is status post a median sternotomy. The patient is rotated to the right LUNGS AND AIRWAYS:  Density is seen at the right lung base compatible with right pleural effusion and adjacent atelectasis/infiltrate PLEURA:  Right pleural effusion appears present BONES:  No acute or aggressive osseous lesions. OTHER:  Endotracheal catheter tip in the mid thoracic trachea at the level of the aortic arch ______________ IMPRESSION: Endotracheal tube in good position  Right pleural effusion with right lung base atelectasis/infiltrate Medication List Reviewed: 
Current Facility-Administered Medications Medication Dose Route Frequency  0.9% sodium chloride infusion 250 mL  250 mL IntraVENous PRN  
 NOREPINephrine (LEVOPHED) 8 mg in 0.9% NS 250ml infusion  2-16 mcg/min IntraVENous TITRATE  propofol (DIPRIVAN) infusion  5-50 mcg/kg/min IntraVENous TITRATE  amiodarone (CORDARONE) injection 300 mg  300 mg IntraVENous DAILY  methylPREDNISolone (PF) (SOLU-MEDROL) injection 50 mg  50 mg IntraVENous DAILY  cefTRIAXone (ROCEPHIN) 2 g in 0.9% sodium chloride (MBP/ADV) 50 mL MBP  2 g IntraVENous Q24H  
 LORazepam (ATIVAN) tablet 1 mg  1 mg Oral Q6H PRN  
 aspirin chewable tablet 81 mg  81 mg Oral DAILY  clopidogrel (PLAVIX) tablet 75 mg  75 mg Oral DAILY  epoetin brenda (EPOGEN;PROCRIT) injection 10,000 Units  10,000 Units SubCUTAneous Q TUE, THU & SAT  traMADol (ULTRAM) tablet 50 mg  50 mg Oral Q6H PRN  
 trimethoprim-sulfamethoxazole (BACTRIM, SEPTRA)  mg per tablet 1 Tab  1 Tab Oral Q MON, WED & FRI  sodium chloride (NS) flush 5-10 mL  5-10 mL IntraVENous PRN  
 atorvastatin (LIPITOR) tablet 40 mg  40 mg Oral DAILY  docusate sodium (COLACE) capsule 100 mg  100 mg Oral BID  ferrous sulfate tablet 325 mg  325 mg Oral ACB  multivitamin, tx-iron-ca-min (THERA-M w/ IRON) tablet 1 Tab  1 Tab Oral DAILY  pantoprazole (PROTONIX) tablet 20 mg  20 mg Oral DAILY  tamsulosin (FLOMAX) capsule 0.4 mg  0.4 mg Oral DAILY  acetaminophen (TYLENOL) tablet 650 mg  650 mg Oral Q4H PRN  
 ondansetron (ZOFRAN) injection 4 mg  4 mg IntraVENous Q4H PRN  
 albuterol-ipratropium (DUO-NEB) 2.5 MG-0.5 MG/3 ML  3 mL Nebulization Q4H PRN

## 2019-01-15 NOTE — PROGRESS NOTES
0700:  Report received from FAVIOLA Urias and [facility] RN. Assumed care of pt. Pt is sleeping in bed, appears comfortable. Family at bedside. Fentanyl and Diladid PCA pumps verified. Comfort care only. 1103:  Patient pronounced  by Dr. Omar Alvarado. Family at bedside. Pastoral care notified. [facility] notified. 1330:  Post-mortem care performed. Pt transported to [facility].

## 2019-01-15 NOTE — PROGRESS NOTES
Problem: Falls - Risk of 
Goal: *Absence of Falls Document Bayron Steele Fall Risk and appropriate interventions in the flowsheet. Outcome: Progressing Towards Goal 
Fall Risk Interventions: 
Mobility Interventions: Bed/chair exit alarm, Patient to call before getting OOB Mentation Interventions: Adequate sleep, hydration, pain control, More frequent rounding Medication Interventions: Bed/chair exit alarm, Teach patient to arise slowly Elimination Interventions: Bed/chair exit alarm History of Falls Interventions: Bed/chair exit alarm

## 2019-01-15 NOTE — PROGRESS NOTES
[facility]. Pulmonary, Critical Care, and Sleep Medicine Name: Muriel Echavarria MRN: [id number] : 1932 Hospital: [facility] Date: 2019  Admission Date: 2019 Chart and notes reviewed. Data reviewed. I have evaluated all findings. HPI noted from Dr Lorena Rodriguez note: 
Miladis Galicia has been seen and evaluated in ICU/ER. Patient is a 80 y.o. M with PMHx significant for Severe CHF (EF 15%), CKD (ANCA associated vasculitis), HTN, Aortic Stenosis, and LV thrombus who initially presented on  for CHF exacerbation. He was given diuretics and started on ABX for PNA. Echo showed EF of 15% which was down from prior. He was seen by cardiology and started on CHF meds and amiodarone. He was offered a life vest but declined. 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 He has been on rituximab and prednisone. On  he developed a large spontaneous hematoma on the R thigh. His AC was stopped.  
  
This afternoon he became bradycardic and had a cardiac arrest on the floor. Rhythm was PEA and he responded to 2 cycles of compressions and epi.  
  
On my assessment he is intubated and responsive. Pupils small and minimally response. He has no spontaneous movement or withdrawal to pain. He has a cough reflex and is passively breathing on the ventilator. BP are in the 90L systolic. The patient is critically ill and can not provide additional history due to Unconsciousness. History taken from chart. \" 
 
Events on 19: 
- Patient has been extubated after having at length discussions and meetings with the family. - Post extubation he is able to follow commands. [x]I have reviewed the flowsheet and previous days notes. [x]The patient is unable to give any meaningful history or review of systems because the patient is: 
[x]Intubated [x]Sedated [x]Unresponsive [x]The patient is critically ill on     
 [x]Mechanical ventilation []Pressors []BiPAP [] ROS:Review of systems not obtained due to patient factors. Events and notes from last 24 hours reviewed. Care plan discussed on multidisciplinary rounds. Patient Active Problem List  
Diagnosis Code  Acute exacerbation of CHF (congestive heart failure) ([facility]) I50.9  HCAP (healthcare-associated pneumonia) J18.9  LV (left ventricular) mural thrombus I51.3  Cardiomyopathy ([facility] I42.9  Acute renal failure superimposed on stage 3 chronic kidney disease ([facility]) N17.9, N18.3  Aortic stenosis I35.0  Hypertension I10  
 ANCA-associated vasculitis ([facility]) I77.6  NSVT (nonsustained ventricular tachycardia) ([facility]) I47.2  Cardiac arrest ([facility] I46.9  Hematoma of right thigh S70.11XA  Acute respiratory failure ([facility]) J96.00 Vital Signs: 
Visit Vitals /51 Pulse 82 Temp 99.2 °F (37.3 °C) Resp 20 Ht 5' 6\" (1.676 m) Wt 98.9 kg (218 lb 0.6 oz) SpO2 94% BMI 35.19 kg/m² O2 Device: Nasal cannula O2 Flow Rate (L/min): 2 l/min Temp (24hrs), Av.6 °F (37 °C), Min:97.9 °F (36.6 °C), Max:99.2 °F (37.3 °C) Intake/Output:  
Last shift:      No intake/output data recorded. Last 3 shifts:  0701 -  1900 In: 702.2 [I.V.:410.1] Out: 1430 [AOGOU:7462] Intake/Output Summary (Last 24 hours) at 2019 3478 Last data filed at 2019 1100 Gross per 24 hour Intake 628.92 ml Output 755 ml Net -126.08 ml Ventilator Settings: 
Ventilator Mode: Assist control, VC+ Respiratory Rate Back-Up Rate: 15 Insp Time (sec): 0.9 sec Ventilator Volumes Vt Set (ml): 500 ml Vt Exhaled (Machine Breath) (ml): 536 ml Ve Observed (l/min): 8.04 l/min Ventilator Pressures PIP Observed (cm H2O): 27 cm H2O Plateau Pressure (cm H2O): 20 cm H2O 
MAP (cm H2O): 10 PEEP/VENT (cm H2O): 5 cm H20 Auto PEEP Observed (cm H2O): 0.1 cm H2O Current Facility-Administered Medications Medication Dose Route Frequency  HYDROmorphone (PF) (DILAUDID) 30 mg / 30 mL PCA   IntraVENous TITRATE  propofol (DIPRIVAN) 10 mg/mL injection  fentaNYL (PF)  mcg/30 ml   IntraVENous TITRATE Telemetry:  
 
Physical Exam:  
General: non-toxic and afebrile HEENT: No JVD elevation. Neck: No abnormally enlarged lymph nodes. Chest: normal 
 Lungs: normal air entry, no dullness/ tenderness/ rash Heart: Irregular rhythm. Abdomen: non distended, bowel sounds normoactive, tympanic, abdomen is soft without significant tenderness, masses, organomegaly or guarding Extremity: negative Neuro: Able to follow commands following extubation. Skin: Right thigh hematoma. DATA: 
MAR reviewed and pertinent medications noted or modified as needed Labs: 
Recent Labs  
  01/14/19 
0230 01/13/19 
1439 01/12/19 
0400 WBC 12.3 15.5* 11.1 HGB 6.9* 7.3* 7.4* HCT 22.4* 24.4* 24.7*  
 205 157 Recent Labs  
  01/14/19 
0230 01/13/19 
1537 01/13/19 
1439 01/13/19 
0522 01/12/19 
0400 *  --  144 144 145  
K 4.4  --  4.6 4.7 4.5  
*  --  107 107 108 CO2 29  --  28 30 29 *  --  240* 132* 125* BUN 84*  --  86* 82* 82* CREA 4.20*  --  4.28* 4.09* 4.16* CA 7.9*  --  7.9* 8.2* 7.7* MG 2.7*  --  2.7*  --   --   
PHOS 3.0  --   --  5.3* 5.6* ALB 2.4*  --  2.5* 2.6* 2.6* SGOT  --   --  77*  --   --   
ALT  --   --  85*  --   --   
INR  --  1.1  --   --  1.2 No results for input(s): PH, PCO2, PO2, HCO3, FIO2 in the last 72 hours. Recent Labs  
  01/14/19 
0454 01/13/19 
1528 FIO2I 50 100 HCO3I 28.9* 28.9*  
PCO2I 40.7 48.0*  
PHI 7.459* 7.388 PO2I 96 228* Imaging: 
[x]                           I have personally reviewed the patients radiographs and reports High complexity decision making was performed during this consultation and evaluation. [x]       Pt is at high risk for further organ failure and dysfunction. Critical care time spent  minutes with patient exclusive of procedures. IMPRESSION and PLAN:  
This is a 80year old male with significant medical history of end stage heart failure, acute on chronic kidney disease (hx of crescenteric glomerulonephritis), emphysema and fibrotic lung disease who unfortunately suffered a PEA cardiac arrest on 1/13/19. He was subsequently intubated and then transferred to ICU. During the day I had a family meeting with patients wife, daughters, son-in-law and grand children. I was apprised of patients wishes that he never wanted any aggressive means of vital organs support including mechanical ventilation, cardiac support and renal support via hemodialysis. I had detailed discussions with patients family including his daughters who hold a DPOA for medical decision making for this gentleman and we decided that in the light of his expressed wishes we are not going to continue these organ support measures including mechanical ventilation and will also not pursue hemodialysis and cardiac support. This family meeting was also attended by the nursing staff and the hospitalist team members. Plan is to extubate and initiate comfort measures. Best practice : 
 
Goals of care were discussed with the patient's family.   
   
Ted Pineda MD

## 2019-01-15 NOTE — PROGRESS NOTES
1900: [facility] Pt resting in bed, side rails raised x3, bed in lowest position, family at bedside. 0300: Pt resting comfortably in bed. 
 
0515: Pt's blood pressure is trending downward and pt is becoming more apneic. Family notified to come to hospital and they said they were going to get the family together and come as soon as possible. 0630: Family at bedside. 0700: Bedside and Verbal shift change report given to Saurav Arteaga RN (oncoming nurse) by Gerardo East 
 (offgoing nurse). Report included the following information SBAR, Kardex, ED Summary, Recent Results, Cardiac Rhythm NSR and Alarm Parameters .

## 2019-01-15 NOTE — ROUTINE PROCESS
Pt Oxygen tank and roller found in pt room, RN spoke with Shyam Wylie via phone, she plans to retrieve the equipment 1/16/19  \"around noon,\" Charge nurse notified, Medical equipment at [facility] 1098 by physician kodak

## 2019-01-15 NOTE — PROGRESS NOTES
responded to a death of a patient in room [id number] today as family were present. Prayer and support given  To family. DORINDA García  home to handle arrangements. Desi Low Board Certified [employer]  
[phone]

## 2019-01-15 NOTE — DISCHARGE SUMMARY
[facility]. 
Discharge Summary Patient: Veronica Cramer MRN: [id number]  CSN: [id number] YOB: 1932  Age: 80 y.o. Sex: male DOA: 1/2/2019 LOS:  LOS: 12 days   Discharge Date:1/15/2019 Admission Diagnoses: HCAP (healthcare-associated pneumonia) Acute exacerbation of CHF (congestive heart failure) ([facility] Discharge Diagnoses:   
Problem List as of 1/15/2019 Date Reviewed: 1/14/2019 Codes Class Noted - Resolved Cardiac arrest [facility]) ICD-10-CM: I46.9 ICD-9-CM: 427.5  1/14/2019 - Present Hematoma of right thigh ICD-10-CM: H30.36LU ICD-9-CM: 924.00  1/14/2019 - Present Acute respiratory failure ([facility] ICD-10-CM: J96.00 
ICD-9-CM: 518.81  1/14/2019 - Present NSVT (nonsustained ventricular tachycardia) ([facility]) ICD-10-CM: I47.2 ICD-9-CM: 427.1  1/7/2019 - Present LV (left ventricular) mural thrombus ICD-10-CM: I51.3 ICD-9-CM: 410.90  1/4/2019 - Present Cardiomyopathy ([facility] ICD-10-CM: I42.9 ICD-9-CM: 425.4  1/4/2019 - Present Acute renal failure superimposed on stage 3 chronic kidney disease ([facility]) ICD-10-CM: N17.9, N18.3 ICD-9-CM: 584.9, 585.3  1/4/2019 - Present Aortic stenosis ICD-10-CM: I35.0 ICD-9-CM: 424.1  1/4/2019 - Present Hypertension ICD-10-CM: I10 
ICD-9-CM: 401.9  1/4/2019 - Present ANCA-associated vasculitis ([facility] ICD-10-CM: I77.6 ICD-9-CM: 447.6  1/4/2019 - Present * (Principal) Acute exacerbation of CHF (congestive heart failure) ([facility]) ICD-10-CM: I50.9 ICD-9-CM: 428.0  1/3/2019 - Present Overview Signed 1/3/2019 12:57 AM by Whit Sherman MD  
  Monitor clinically Gentle diuresis Strict I&O Echo in am 
 
  
  
   
 HCAP (healthcare-associated pneumonia) ICD-10-CM: J18.9 ICD-9-CM: 923  1/3/2019 - Present Overview Signed 1/3/2019 12:59 AM by Whit Sherman MD  
  HCAP Abx coverage with Zosyn, Levaquin and Vanco 
Nebs as needed O2 to maintain pulse ox >91% Discharge Condition:  Consults: Cardiology, Hospitalist, Nephrology, Orthopedics and Pulmonary/Critical Care Hospital Course: 79 y/o  male with hx of cardiomyopathy, aortic stenosis s/p porcine valve and closure of patent foramen ovale (2009), pulmonary fibrosis, ANCA negative associated vasculitis, CAD, DM and CKD 3, presented to the ED on 1/3 via EMS with worsening SOB and BLE edema over the past week. Oxygen sats 88% on RA and was placed on oxygen with some relief. Pt was [facility] from [facility] on  for transient vision loss- seen by neuro and vascular surgery deemed likely ischemic, also had CT guided renal biopsy on  which confirmed acute crescentic GN. Pt has been on high dose oral Prednisone and so far has received two doses of Rituximab (last dose on ). CT chest note pulmonary lesions which are felt to be due to vasculitis vs mets.   
  
Work up in the ED - CXR findings suspicious for pulmonary edema superimposed on chronic interstitial lung disease, suspected additional bibasilar superimposed alveolar component although pneumonia is difficult to exclude, probable small pleural effusions. Troponin 0.09 - -> 0.11 - -> 0.11, creatinine 2.48 and BNP 50237 in ED. Pt started on IV diuretics this admission and admitted for further management. Echo obtained 1/3 showed EF < 15%, LV hypertrophy, LV global hypokinesis, small echogenic and calcified thrombus LV, porcine bioprosthetic aortic valve, trace mitral valve regurgitation. Cardiology consulted. Pt started on Heparin gtt and oral Coumadin. Creatinine increased to 3 on , nephrology consulted given recent biopsy findings and increasing creatinine given diuresis. Nephrology recommends continue oral prednisone, pt's next dose of Rituximab was supposed to be on  but will hold until pneumonia is treated. Pt started on Bactrim for PCP prophylaxis.   Ok to resume Lasix per nephrology. Lasix restarted. Patient was ok to go home 1/5 but required 02 and delayed dc in addition life vest was ordered but patient refused. Heparin gtt dc'd on 1/7 as INR 2.3- as recommended per cardiology notes. Acute weakness this am, pt unable to sit up in bed and PT unable to work with pt- he was ambulatory over weekend. Pt c/o left lateral thigh pain and numbness inner right thigh. CT abd/pelvis w/o contrast showed lobulated intramuscular hypodense lesion involving RLE muscles greatest in pectineus muscle, right pleural effusion w/ multiple pleural based nodules, abnormal linear hypodensity within anteroinferior aspect of L4 vertebral body suggesting compression fracture w/ possible localized osteonecrotic cavity. Findings discussed with pt and daughter- plan for MRI L spine and right thigh- pending. Pt has agreed to SNF- CM following and matching to facilities. Pt will not be discharged. Official read from MRI still pending however radiologist left note in chart from MRI L spine note acute fracture inferior endplate L4. MRI right femur 10 cm hemorrhagic fluid collection, right adductor longus muscle w/ moderate surrounding perilesional intramuscular and intermuscular medial right thigh hemorrhage or edema. Ortho consulted- no clinical evidence of compartment syndrome, symptoms more related to L4 compression fracture- continue prednisone and supplement with lumbar corset- f/u in office with Dr. Mohr Files- consider epidural steroid injections. INR up to 3.7 today- Coumadin dc'd- discussed with cardiology- risks outweighs benefit at this time, LV thrombus calcified on Echo, thus Coumadin will not be continued- pt to start on ASA/Plavix once INR < 2. Creatinine up to 4. Plan for 3rd infusion of Rituximab tomorrow. [facility] to come this afternoon to fit pt for Lumbar corset. Planning for d/c to [facility] when medically stable.   INR down to 1.3 on 1/10- plan to start ASA and Plavix on . Creatinine continues to increase- defer to nephrology for management. Note downward trend of creatinine. S/p Rituximab infusion . Started on ASA/Plavix, Hgb stable. Creatinine trending down. Pt is s/p cardiopulmonary arrest on - pt became bradycardic then PEA arrest, he responded to two cycles of compressions and epinephrine. Pt intubated and transferred to ICU- no spontaneous movement or withdrawal to pain. UA  with moderate leukocyte esterase, 3+ bacteria, WBC 11 to 20. Urine culture pending. CXR  note right pleural effusion w/ right lung base atelectasis/infiltrate. Family wishes to make pt DNR. Family meeting with hospitalist service and [facility]- family wishes to proceed with compassionate extubation- states pt would never want this. Comfort orders placed. Pt  at 11:03 am on 1/15/2019 in the presence of family.  present. Significant Diagnostic Studies:  
Results for Clarke Husbands (MRN [id number]) as of 1/15/2019 08:24 Ref. Range 2019 02:30 WBC Latest Ref Range: 4.6 - 13.2 K/uL 12.3 RBC Latest Ref Range: 4.70 - 5.50 M/uL 2.51 (L) HGB Latest Ref Range: 13.0 - 16.0 g/dL 6.9 (L) HCT Latest Ref Range: 36.0 - 48.0 % 22.4 (L) MCV Latest Ref Range: 74.0 - 97.0 FL 89.2 MCH Latest Ref Range: 24.0 - 34.0 PG 27.5 MCHC Latest Ref Range: 31.0 - 37.0 g/dL 30.8 (L) RDW Latest Ref Range: 11.6 - 14.5 % 14.8 (H) PLATELET Latest Ref Range: 135 - 420 K/uL 195 MPV Latest Ref Range: 9.2 - 11.8 FL 11.0 Results for Clarke Husbands (MRN [id number]) as of 1/15/2019 08:24 Ref. Range 2019 02:30 Sodium Latest Ref Range: 136 - 145 mmol/L 146 (H) Potassium Latest Ref Range: 3.5 - 5.5 mmol/L 4.4 Chloride Latest Ref Range: 100 - 108 mmol/L 109 (H) CO2 Latest Ref Range: 21 - 32 mmol/L 29 Anion gap Latest Ref Range: 3.0 - 18 mmol/L 8 Glucose Latest Ref Range: 74 - 99 mg/dL 154 (H) BUN Latest Ref Range: 7.0 - 18 MG/DL 84 (H) Creatinine Latest Ref Range: 0.6 - 1.3 MG/DL 4.20 (H) BUN/Creatinine ratio Latest Ref Range: 12 - 20   20 Calcium Latest Ref Range: 8.5 - 10.1 MG/DL 7.9 (L) Ionized Calcium Latest Ref Range: 1.12 - 1.32 MMOL/L 1.05 (L) Phosphorus Latest Ref Range: 2.5 - 4.9 MG/DL 3.0 Magnesium Latest Ref Range: 1.6 - 2.6 mg/dL 2.7 (H) GFR est non-AA Latest Ref Range: >60 ml/min/1.73m2 14 (L)  
GFR est AA Latest Ref Range: >60 ml/min/1.73m2 16 (L) Albumin Latest Ref Range: 3.4 - 5.0 g/dL 2.4 (L)  
 
 
 
CT abd/pelvis 1/72019 IMPRESSION: 
  
1. No evidence of nephric/perinephric hematoma.  
  
2. Partially visualized lobulated intramuscular hypodense lesion involving the 
proximal right lower extremity muscles greatest within the pectoralis muscle. Differential diagnosis primarily includes intramuscular hematoma (patient 
reportedly anticoagulated) or intramuscular abscess. Similar findings can be 
produced by myonecrosis or rhabdomyolysis in the appropriate setting (diabetes, 
prolonged unresponsiveness/found down) etc. Clinical correlation is recommended. Further right lower extremity cross-sectional imaging, CT or MRI, would better 
evaluate the extent of this muscular abnormality, and MRI would likely better 
differentiate between hematoma versus abscess, if clinically uncertain. 
  
3. Right pleural effusion with multiple pleural based nodules, also described on 
prior outside CT chest abdomen pelvis 12/31/2018 (images not available for 
review). None of these thoracic findings were present on review of a more remote CT chest dated 06/22/2010. These are indeterminate but may represent pleural 
carcinomatosis. Does patient have known primary carcinoma/lung carcinoma?  As 
described on prior outside report, potentially PET/CT may be helpful to evaluate 
for abnormal metabolic activity. 
  
These unexpected results of impression #2 were directly communicated to  
 Maurice Proffer  on 1/7/2019 at approximately 11:25 AM.  Results understood and 
acknowledged. Delay in dictation secondary to PACS IT failure. MRI L spine 1/7/2019 IMPRESSION: 
  
1. Acute/subacute L4 compression fracture with fracture lines adjacent to the 
inferior endplate and associated very small anterior inferior osteonecrotic 
cavity suggesting benign osteoporotic type fracture. No suspicious underlying 
lesion is seen. No associated retropulsion or high-grade stenosis. 
  
2. No high-grade central stenosis. 
  
3. Mild L1-L2 stenosis related to disc bulge and endplate osteophyte. 
  
4. Moderate to severe bilateral L5-S1 foraminal stenosis, left greater than 
right, with contact of the exiting L5 nerve roots. Clinical correlation 
regarding possible L5 radiculopathy is recommended. 
  
5. Additional mild multilevel degenerative changes , as described in detail in 
Findings section. MRI Right Femur 1/7/2019 IMPRESSION: 
  
1. Noncontrast exam. Elliptical 10 cm hemorrhagic fluid collection, right 
adductor longus muscle, with moderate surrounding perilesional intramuscular and 
intermuscular medial right thigh hemorrhage or edema. Impossible to determine if 
benign or malignant lesion without contrast, though favor the former. Clinical 
follow-up to resolution is recommended, with consideration for re-imaging if not 
resolving or progressing.  
 
 
 
 
Breanna Ash NP 
1/15/2019, 11:03 AM

## 2019-01-15 NOTE — PROGRESS NOTES
Noted that patient is comfort measures only. Nephrology will sign off Please call with questions, 
 
Sherice Taylor MD [facility] [phone] Pager: [phone]

## 2019-01-16 LAB
BACTERIA SPEC CULT: NORMAL
SERVICE CMNT-IMP: NORMAL

## 2019-01-17 NOTE — ADT AUTH CERT NOTES
Pneumonia, Community Acquired - Care Day 12 (1/14/2019) by Lawson Ford RN Review Status Review Entered Completed 1/17/2019 13:20 Criteria Review Care Day: 12 Care Date: 1/14/2019 Level of Care: ICU Guideline Day 2 Clinical Status ( ) * No [id number] or acidosis ( ) * No requirement for mechanical ventilation ( ) * Hypotension absent  
(X) * Fever absent or reduced ( ) * No hypoxia on room air or oxygenation improved ( ) * Mental status improved or at baseline Activity ( ) * Increased activity 1/17/2019 1:20 PM EST by Fabio Goncalves Subject: Additional Clinical Information 1-14-19IM A/P:A/P:Cardiac Arrest / Acute respiratory failure- bradycardia then PEA, intubated on 1/13- PCCM following- VAPP bundle, daily CXR/ABG while intubated, sedation holiday- Levophed- keep SBP > 90 mmHg- pt wishes for DNR, compassionate extubationãAcute Exacerbation of CHF /Cardiomyopathy- cardiology following- BNP 06365 on admission- Lasix held due to increasing creatinine,- Echo 1/3 with EF < 15%, LV hypertrophy, LV global hypokinesis, small echogenic and calcified thrombus LV, porcine bioprosthetic aortic valve, trace mitral valve regurgitation- monitor I/O's- Patient refuses life vestãAcute fracture inferior end plate L4- pt with acute weakness, inability to walk or sit up in bed despite assistance with PT- denies falls, bowel/bladder incontinence- MRI L spine showed acute fracture inferior endplate L4- ortho consulted- continue prednisone and supplement with lumbar corset- f/u outpatient with Dr. Shereen Posey, consider epidural steroid injectionsãHCAP- completed IV abx- started on Bactrim per nephrology for PCP prophylaxis- supplemental oxygen, duo-nebs, prednisone dailyãLV Thrombus- noted on Echo this admission- Heparin gtt- dc'd 1/7- Coumadin dc'd 1/9 due to supratherapeutic INR- Vitamin K 10 mg x 1 today- discussed with cardiology- risks outweigh benefit, Coumadin will not be continued at this time since LV thrombus appeared calcified on Echo- plan to start ASA/Plavix 1/12ãNSVT- cardiology following- Amiodarone 300 mg IV dailyãHx of aortic stenosis and aortic valve replacement- started on Coumadin 1/3- INR up to 3.7 - Coumadin dc'd- risks outweigh benefit, thus Coumadin will not be continued- cardiology followingAcute on chronic renal failure superimposed on CKD 3- nephrology following- appreciate assistance- 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 Pt intubated and transferred to ICU- no spontaneous movement or withdrawal to pain. UA 1/13 with moderate leukocyte esterase, 3+ bacteria, WBC 11 to 20. Urine culture pending. CXR 1/13 note right pleural effusion w/ right lung base atelectasis/infiltrate. Family wishes to make pt DNR. Family meeting with hospitalist service and PCCM- family wishes to proceed with compassionate extubation- states pt would never want this. Comfort orders placed.  Objective:General appearance: sedated on propofol, intubated on ventilatorHead: Normocephalic, without obvious abnormality, atraumaticLungs: clear to auscultation bilaterallyHeart: bradycardic, S1, S2 normal, no murmur, click, rub or gallopAbdomen: soft, non tender, non distended. Normoactive bowel sounds. Extremities: extremities normal, atraumatic, no cyanosis, trace edema BLESkin: scattered bruising BUE, erythema/bruising noted to right inner thigh down to knee, also bruising noted to posterior right thigh- improving- RLE soft to touchNeurologic: intubated, sedatedPSY: intubated sedated  Vitals- 98.7, 86, 20, 138/52, 100% mechanical vent  Meds- fentanyl PCA, fentanyl 25mcg iv q 1hr prn x 2, dilaudid PCA, Ativan 1mg iv q 15min prn x 2, levophed gtt and propofol gtt d/câd * Milestone Pneumonia, Community Acquired - Care Day 11 (1/13/2019) by Cheikh Cortez, RN Review Status Review Entered Completed 1/17/2019 13:13 Criteria Review Care Day: 11 Care Date: 1/13/2019 Level of Care: ICU Guideline Day 2 Clinical Status ( ) * No [id number] or acidosis ( ) * No requirement for mechanical ventilation 1/17/2019 1:13 PM EST by Hayden Link intubated in cardiac arrest code blue ( ) * Hypotension absent  
(X) * Fever absent or reduced ( ) * No hypoxia on room air or oxygenation improved ( ) * Mental status improved or at baseline Activity ( ) * Increased activity 1/17/2019 1:13 PM EST by Sidney Coughlin Subject: Additional Clinical Information [id number] A/P:Assessment:Cardiac Arrest- due to end stage heart failureSevere CHF- EF 15%, LV and RV dysfunction- refused life vestAcute Respiratory Failure- intubated 1/13 during cardiac arrestEncephalopathy- 2/2 cardiac arrest, unclear if hypoxic injury occurredLV thrombus- no AC due to thigh hematomaR Thigh Hematoma- spontaneous with AC, improvingCrescentric GN- biopsy proven- on rituxan and predCKDEmphysemaInterstitial Lung Disease- mild fibrosis seen in 2010- recent CT abdomen with worsening fibrotic changes- possibly ANCA related? R pleural effusionMultiple large R basilar pulmonary nodulesãImpression/Plan:- Devastating neuro exam at this point- Will get ABG and CXR- adjust vent accordingly- keep off sedation, propofol if agitated- Start levophed for BP- CBC, CMP, lactic acid- PT/INR- Continue amiodarone to IV, methylpred to IV- Supportive care, family discussion 1/17/2019 1:13 PM EST by Yordan Velazquez Subject: Additional Clinical Information 1-13-19  Hospitalist Note @ 1331:CODE BLUE NOTE:Responded to [employer] had Bradycardia down to Asystole. CPR was initiated and ACLS protocol was begun. Asystole protocol was performed with Epinephrine give for 2 rounds. Chest compressions were performed between EpinephrinePatient was intubated. ROSC was obtained after approximately 8 minutes.  Vitals- 99.1, 93, 15, 85/52, 100% mechanical vent  Abnl labs- wbc 15.5, rbc 2.64, hgb 7.3, hct 24.4, neutrophils 95, lymphocytes 3, monocytes 2, glucose 240, bun 86, creatinine 4.28, calcium 7.9, mag 2.7, gfr 13, t protein 5.4, albumin 2.5alt 85, ast 77UA: 100 protein, large blood, moderate leuks, 3+ bacteriaABG: pCO2 48.0, pO2 228, HCO3 28.9, sO2 100  CXR- Endotracheal tube in good positionRight pleural effusion with right lung base atelectasis/infiltrate  EKG- Sinus rhythm with sinus arrhythmia with occasional premature ventricularcomplexesLeft bundle branch blockAbnormal ECG  Meds- code blue epi x 2, lasic 40mg iv x 1, rocephin 2g iv q 24hr, solumedrol 50mg iv qd, levophed iv gtt titrate, propofol iv gtt titrate * Milestone Pneumonia, Community Acquired - Care Day 10 (1/12/2019) by Monica Snow RN Review Status Review Entered Completed 1/17/2019 13:02 Criteria Review Care Day: 10 Care Date: 1/12/2019 Level of Care: Inpatient Floor Guideline Day 2 Level Of Care (X) Floor 1/17/2019 1:02 PM EST by Yordan Velazquez  
  medical floor Clinical Status ( ) * No [id number] or acidosis  
(X) * No requirement for mechanical ventilation 1/17/2019 1:02 PM EST by Yordan Velazquez  
  none noted ( ) * Hypotension absent  
(X) * Fever absent or reduced 1/17/2019 1:02 PM EST by Reddy Seaman afebrilashell ( ) * No hypoxia on room air or oxygenation improved ( ) * Mental status improved or at baseline Activity ( ) * Increased activity Routes  
(X) Oral hydration, medications 1/17/2019 1:02 PM EST by Yaquelin Farley see below Interventions (X) Possible oxygen 1/17/2019 1:02 PM EST by Evan Lane  
  3L O2 via NC Medications (X) IV or oral antibiotics 1/17/2019 1:02 PM EST by Yaquelin Farley see below 1/17/2019 1:02 PM EST by Evan Lane Subject: Additional Clinical Information 1-12-19IM A/P:Acute exacerbation of CHF (congestive heart failure) ([facility] [address] 01/03/2019ã ã Monitor clinicallyGentle diuresisStrict I&OEcho in amããâ¢ HCAP (healthcare-associated pneumonia) 01/03/2019ã ã HCAP Abx coverage with Zosyn, Levaquin and VancoNebs as neededO2 to maintain pulse ox >91%ãã- Cr trending down- s/p 3 doses of rituximab- Cont prednisone- IV lasix per nephro- BP in good range- No AC w/ coumadin as benefit outweighs risk given fall risk and large hematoma in leg given that lesion seen on echo likely calcified- Cont ASA/plavix- Hgb at 7.4- Trend and transfuse if < 7- Cont amio, pt declined life vest- PT/OT- Cont acceptable home medications for chronic conditions- DVT protocol 1/17/2019 1:02 PM EST by Evan Ariana Subject: Additional Clinical Information 1--12-19  Subjective:Pt s/e @ bedsideNo major events overnightAnxiousOtherwise, doing OKEager to get to rehabDenies CP or SOB  ãObjective:General Appearance: NAD, conversantLungs: Decreased BS B/L with cracklesCV: RRR, no m/r/gAbdomen: soft, non-tender, normal bowel soundsExtremities: no cyanosis, RLE w/ large area of ecchymoses, no TTPNeuro: No focal deficits, motor/sensory intact  Vitals- 98.9, 77, 20, 127/75, 94% 3L O2 via NC  Physical Exam:gen NADHENT mmmRS AEBE coarseCVS s1 s2 wnl no JVDGI soft BS +Ext 1+ LE edema  Abnl labs- rbc 2.68, hgb 7.4, hct 24.7, glucose 125, bun 82, creatinine 4.16, calcium 7.7, phos 5.6, gfr 14, albumin 2.6  Meds-lasix 60mg po iv x 1, amiodarone 400mg po qd, Lipitor 40mg po qd, asa 81mg po qd, Plavix 75mg po qd, ferrous sulfate 325mg po qd, ther-m with iron po qd, protonix 20mg po qd, Ativan 1mg po q 6hr prn x 2, prednisone 60mg po qd, rituxan 500mg IV titrate, Flomax 0.4mg po qd, tramadol 50mg po q 6hr prn x 3, Bactrim 80-400mg po 3x a weekNEPH A/P:1. SETH on CKD3: nonoliguric. Creat coming down. 2. HFrEF: Cardiorenal syndrome. Lasix 60 mg IV today. 3. ANCA: associated crescentic GN/microscopic polyangiitis. Continue Prednisone. S/p 3 doses of Rituximab. 4. HTN: BPãin good range. 5. LV thrombus. risk of ac outweigh benefits,ãCoumadin stopped. ã6. NSTVT. On amiodarone. Declined lifeãvest. ã7. L4 compression fracture. Ortho recs noted. 8. Rt thigh hematoma (spontaneous in a setting of ac). No need for surgery per ortho. 9. anemia. ãMay need transfusionsãif < 7 Hb.ãOnããepogen. ãTIW * Milestone Pneumonia, Community Acquired - Care Day 9 (1/11/2019) by Tony Plummer RN Review Status Review Entered Completed 1/17/2019 12:46 Criteria Review Care Day: 9 Care Date: 1/11/2019 Level of Care: Inpatient Floor Guideline Day 2 Level Of Care (X) Floor Clinical Status ( ) * No [id number] or acidosis  
(X) * No requirement for mechanical ventilation 1/17/2019 12:46 PM EST by Emily Humphrey none noted ( ) * Hypotension absent  
(X) * Fever absent or reduced 1/17/2019 12:46 PM EST by Emily Humphrey afebrile ( ) * No hypoxia on room air or oxygenation improved ( ) * Mental status improved or at baseline Activity ( ) * Increased activity Routes  
(X) Oral hydration, medications 1/17/2019 12:46 PM EST by Emily Humphrey see below Interventions (X) Possible oxygen 1/17/2019 12:46 PM EST by Qamar Elliott   3L O2 via NC Medications (X) IV or oral antibiotics 1/17/2019 12:46 PM EST by Diane Perez see below 1/17/2019 12:46 PM EST by Dejuan Singh Subject: Additional Clinical Information [id number] A/P:1. SETH on CKD 3( 1.8-2 range recently) . SETH is likely due to decompensated HFrEF and cardiorenal syndrome. CKD is due to biopsy proven anca associated crescentic GN. Scr is plateauing , anticipate start trending down over next 24-48 hrs. Mildly hypervolemic , will give a dose of lasix to diurese2. 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 Continue prednisone. Will proceed with 3rd infusion of rituximabãtoday. ã3. Decompensated HFrEF/volume overload. Lasix today ã4. HTN. BP in good range5. LV thrombus. risk of ac outweigh benefits, Coumadin stopped. ã6. NSTVT. On amiodarone. Declined lifeãvest. ã7. L4 compression fracture. Ortho recs noted. 8. Rt thigh hematoma (spontaneous in a setting of ac). No need for surgery per ortho. 9. anemia. ãMay need transfusions if < 7 Hb. On epogen. ãTIW  ãVitals- 97.6, 61, 20, 134/70, 95% 3L O2 via NC  Physical Exam:gen NADHENT mmmRS AEBE coarseCVS s1 s2 wnl no JVDGI soft BS +Ext 1+ LE edema  Abnl labs- rbc 2.60, hgb 7.2, hct 23.3, glucose 113, bun 82, creatinine 4.51, calcium 7.8, phos 5.9, gfr 12, albumin 2.4  Meds- Tylenol 650mg po x 1, Benadryl 50mg iv x 1, Lasix 20mg iv x 1, ns 100ml/hr iv cont, Tylenol 650mg po q 4hr prn x 1, amiodarone 400mg po qd, Lipitor 40mg po qd, ferrous sulfate 325mg po qd, ther-m with iron po qd, protonix 20mg po qd, prednisone 60mg po qd, rituxan 500mg IV titrate, Flomax 0.4mg po qd, tramadol 50mg po q 6hr prn x 4, Bactrim 80-400mg po 3x a week 1/17/2019 12:46 PM EST by Dejuan Singh Subject: Additional Clinical Information 1-11-19  IM A/P:Acute Exacerbation of CHF /Cardiomyopathy- cardiology following- BNP [id number] on admission- Lasix daily (oral), monitor I/O- Echo 1/3 with EF < 15%, LV hypertrophy, LV global hypokinesis, small echogenic and calcified thrombus LV, porcine bioprosthetic aortic valve, trace mitral valve regurgitation- resume home meds- Patient refuses life vestãAcute fracture inferior end plate L4- pt with acute weakness, inability to walk or sit up in bed despite assistance with PT- denies falls, bowel/bladder incontinence- MRI L spine showed acute fracture inferior endplate L4- ortho consulted- continue prednisone and supplement with lumbar corset- f/u outpatient with Dr. Sarah Baldwin, consider epidural steroid injectionsãHCAP- completed IV abx- started on Bactrim per nephrology for PCP prophylaxis- supplemental oxygen, duo-nebs, prednisone dailyãLV Thrombus- noted on Echo this admission- Heparin gtt- dc'd 1/7- Coumadin dc'd 1/9 due to supratherapeutic INR- Vitamin K 10 mg x 1 today- discussed with cardiology- risks outweigh benefit, Coumadin will not be continued at this time since LV thrombus appeared calcified on Echo- plan to start ASA/Plavix tomorrow 1/12ãNSVT- cardiology following- was on Amiodarone IV, now on 400 mg oralãHx of aortic stenosis and aortic valve replacement- started on Coumadin 1/3- INR up to 3.7 - Coumadin dc'd- risks outweigh benefit, thus Coumadin will not be continued- cardiology followingãAcute on chronic renal failure superimposed on CKD 3- nephrology following- appreciate assistance- 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- continue Prednisone 60 mg daily- pt received two doses of of Rituximab (last dose on 1/12019), next dose today 1/11- monitor I/O's, BMPãPulmonary Nodules-CT chest done 12/24/18 with multiple pulmonary and pleural based nodules suspicious for neoplasm- was recommended to pursue PET scan at d/cãHypertension- continue home medsãDVT Prophylaxis- supratherapeutic INR, Coumadin dc'd- start on ASA/Plavix 1/12 * Milestone Pneumonia, Community Acquired - Care Day 8 (1/10/2019) by Hemant Sharma RN Review Status Review Entered Completed 1/17/2019 10:48 Criteria Review Care Day: 8 Care Date: 1/10/2019 Level of Care: Inpatient Floor Guideline Day 2 Level Of Care (X) Floor 1/17/2019 10:48 AM EST by Rachid Watts  
  medical floor Clinical Status ( ) * No [id number] or acidosis  
(X) * No requirement for mechanical ventilation 1/17/2019 10:48 AM EST by Meek Alvarez none noted ( ) * Hypotension absent  
(X) * Fever absent or reduced 1/17/2019 10:48 AM EST by Meek Alvarez afebrile ( ) * No hypoxia on room air or oxygenation improved ( ) * Mental status improved or at baseline Activity ( ) * Increased activity Routes  
(X) Oral hydration, medications 1/17/2019 10:48 AM EST by Rachid Edu  
  see below Interventions (X) Possible oxygen 1/17/2019 10:48 AM EST by Rachid Watts  
  3L O2 via NC Medications (X) IV or oral antibiotics 1/17/2019 10:48 AM EST by Rachid [employer]  
  see below 1/17/2019 10:48 AM EST by Rachid Edu Subject: Additional Clinical Information 1-10-19IM A/P:Acute Exacerbation of CHF /Cardiomyopathy- cardiology following- BNP 16593 on admission- Lasix daily (oral), monitor I/O- Echo 1/3 with EF < 15%, LV hypertrophy, LV global hypokinesis, small echogenic and calcified thrombus LV, porcine bioprosthetic aortic valve, trace mitral valve regurgitation- resume home meds- Patient refuses life vest  Acute fracture inferior end plate L4- pt with acute weakness, inability to walk or sit up in bed despite assistance with PT- denies falls, bowel/bladder incontinence- MRI L spine showed acute fracture inferior endplate L4- ortho consulted- continue prednisone and supplement with lumbar corset- f/u outpatient with Dr. Grace Blake, consider epidural steroid injections   HCAP- completed IV abx- started on Bactrim per nephrology for PCP prophylaxis- supplemental oxygen, duo-nebs, prednisone daily  LV Thrombus- noted on Echo this admission- Heparin gtt- dc'd 1/7- Coumadin dc'd 1/9 due to supratherapeutic INR- Vitamin K 10 mg x 1 today- discussed with cardiology- risks outweigh benefit, Coumadin will not be continued at this time since LV thrombus appeared calcified on Echo- plan to start ASA/Plavix tomorrow 1/11  NSVT- cardiology following- was on Amiodarone IV, now on 400 mg oral  Hx of aortic stenosis and aortic valve replacement- started on Coumadin 1/3- INR up to 3.7 today, Coumadin dc'd- risks outweigh benefit, thus Coumadin will not be continued- cardiology following  Acute on chronic renal failure superimposed on CKD 3- nephrology following- appreciate assistance- 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  
  
1/17/2019 10:48 AM EST by Qamar Elliott Subject: Additional Clinical Information 1-10-19Subjective:Patient complaints off: Feels, looks better. Sitting in bed. No sob with exertion. Lbp/lt hip pain is better. Also c/o rt inner thigh pain on/off. Objective:General: NAD, alert and oriented. Neck: No jvd. LUNGS: Clear to Auscultation, No rales, rhonchi or wheezes. CVS EXM: S1, S2   RRR, no murmurs/gallops/rubs.  Mild tenderness to palpation of the lt flank, lt hip. Rt lateral/inner thigh with large hematoma, non tender.  Lower Extremities:   1+ edema, rt>lt.  Vitals- 97.6, 70, 16, 95/57, 92% 3L O2 via NCAbnl labs- wbc 13.3, rbc 2.75, hgb 7.5, hct 24.2, inr 1.3, pt 16.3, bun 79, creatinine 4.55, calcium 8.0, phos 5.9, gfr 12, albumin 2.6Meds- amiodarone 400mg po qd, lipitor 40mg po qd, epoetin alpha 10,000u sc x 1, ferrous sulfate 325mg po qd, isordil 5mg po bid, metoprolol 25mg po bid, ther-m with iron po qd, protonix 20mg po qd, zosyn 3.375g iv q 12hr, prednisone 60mg po qd, flomax 0.4mg po qd, tramadol 50mg po q 6hr prn x 1NEPH A/P:SETH on CKD 3. Current worsening of renal function is likely due to decompensated HFrEF and cardiorenal syndrome. [place] seth/ckd are due to biopsy proven anca associated crescentic GN. Scr is up again since yesterday, likely in the setting of rt thigh hematoma with worsening anemia and somewhat low bp. May also be vanc nephrotoxicity. Continue to monitor closely. Would prefer to see improvement in renal function before transferring patient to [facility].   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 Continue prednisone. Will proceed with 3rd infusion of rituximab tomorrow. Decompensated HFrEF/volume overload. Improved, continue to hold lasix. HTN. BP is rather low, d/c isosorbide and metoprolol. LV thrombus. D/c cardiology, risk of ac outweigh benefits. Coumadin stopped. NSTVT. On amiodarone. Declined life vest.   L4 compression fracture. Ortho recs noted. Rt thigh hematoma (spontaneous in a setting of ac). No need for surgery per ortho. Worsening anemia. May need transfusions. Will give epogen * Milestone
